# Patient Record
Sex: MALE | Race: BLACK OR AFRICAN AMERICAN | Employment: UNEMPLOYED | URBAN - METROPOLITAN AREA
[De-identification: names, ages, dates, MRNs, and addresses within clinical notes are randomized per-mention and may not be internally consistent; named-entity substitution may affect disease eponyms.]

---

## 2020-10-06 LAB — HBA1C MFR BLD HPLC: 11.4 %

## 2020-11-02 ENCOUNTER — VIRTUAL VISIT (OUTPATIENT)
Dept: FAMILY MEDICINE CLINIC | Age: 50
End: 2020-11-02

## 2020-11-02 DIAGNOSIS — E78.5 DYSLIPIDEMIA: ICD-10-CM

## 2020-11-02 DIAGNOSIS — Z86.69 HISTORY OF SLEEP APNEA: ICD-10-CM

## 2020-11-02 DIAGNOSIS — I48.91 ATRIAL FIBRILLATION, UNSPECIFIED TYPE (HCC): ICD-10-CM

## 2020-11-02 DIAGNOSIS — I10 ESSENTIAL HYPERTENSION: Primary | ICD-10-CM

## 2020-11-02 DIAGNOSIS — I50.9 CHRONIC CONGESTIVE HEART FAILURE, UNSPECIFIED HEART FAILURE TYPE (HCC): ICD-10-CM

## 2020-11-02 DIAGNOSIS — E11.9 CONTROLLED TYPE 2 DIABETES MELLITUS WITHOUT COMPLICATION, WITHOUT LONG-TERM CURRENT USE OF INSULIN (HCC): ICD-10-CM

## 2020-11-02 PROBLEM — E66.01 MORBID OBESITY WITH BODY MASS INDEX (BMI) OF 40.0 TO 44.9 IN ADULT (HCC): Status: ACTIVE | Noted: 2020-03-20

## 2020-11-02 PROBLEM — J45.30 MILD PERSISTENT ASTHMA WITHOUT COMPLICATION: Status: ACTIVE | Noted: 2020-03-20

## 2020-11-02 PROBLEM — Z91.14 NONCOMPLIANCE WITH DIET AND MEDICATION REGIMEN: Status: ACTIVE | Noted: 2020-03-21

## 2020-11-02 PROBLEM — I11.9 HYPERTENSIVE CARDIOMEGALY: Status: ACTIVE | Noted: 2020-03-20

## 2020-11-02 PROBLEM — Z91.119 NONCOMPLIANCE WITH DIET AND MEDICATION REGIMEN: Status: ACTIVE | Noted: 2020-03-21

## 2020-11-02 PROCEDURE — 99204 OFFICE O/P NEW MOD 45 MIN: CPT | Performed by: NURSE PRACTITIONER

## 2020-11-02 RX ORDER — SPIRONOLACTONE 50 MG/1
50 TABLET, FILM COATED ORAL DAILY
Qty: 90 TAB | Refills: 1 | Status: SHIPPED | OUTPATIENT
Start: 2020-11-02 | End: 2021-05-13 | Stop reason: SDUPTHER

## 2020-11-02 RX ORDER — PRAVASTATIN SODIUM 20 MG/1
40 TABLET ORAL
Qty: 180 TAB | Refills: 1 | Status: CANCELLED | OUTPATIENT
Start: 2020-11-02

## 2020-11-02 RX ORDER — ATORVASTATIN CALCIUM 40 MG/1
40 TABLET, FILM COATED ORAL
Qty: 90 TAB | Refills: 1 | Status: SHIPPED | OUTPATIENT
Start: 2020-11-02 | End: 2021-05-13 | Stop reason: SDUPTHER

## 2020-11-02 RX ORDER — HYDRALAZINE HYDROCHLORIDE 50 MG/1
50 TABLET, FILM COATED ORAL 3 TIMES DAILY
Qty: 270 TAB | Refills: 1 | Status: SHIPPED | OUTPATIENT
Start: 2020-11-02 | End: 2021-05-13 | Stop reason: SDUPTHER

## 2020-11-02 RX ORDER — METFORMIN HYDROCHLORIDE 500 MG/1
500 TABLET ORAL 2 TIMES DAILY
COMMUNITY
Start: 2020-10-07 | End: 2020-11-02 | Stop reason: SDUPTHER

## 2020-11-02 RX ORDER — CARVEDILOL 12.5 MG/1
12.5 TABLET ORAL 2 TIMES DAILY WITH MEALS
Qty: 180 TAB | Refills: 1 | Status: SHIPPED | OUTPATIENT
Start: 2020-11-02 | End: 2021-05-13 | Stop reason: SDUPTHER

## 2020-11-02 RX ORDER — ATORVASTATIN CALCIUM 40 MG/1
40 TABLET, FILM COATED ORAL
COMMUNITY
Start: 2020-10-07 | End: 2020-11-02 | Stop reason: SDUPTHER

## 2020-11-02 RX ORDER — METFORMIN HYDROCHLORIDE 500 MG/1
500 TABLET ORAL 2 TIMES DAILY
Qty: 180 TAB | Refills: 1 | Status: SHIPPED | OUTPATIENT
Start: 2020-11-02 | End: 2021-05-13 | Stop reason: SDUPTHER

## 2020-11-02 RX ORDER — BLOOD SUGAR DIAGNOSTIC
STRIP MISCELLANEOUS
COMMUNITY
Start: 2020-10-07 | End: 2021-09-17

## 2020-11-02 RX ORDER — LANCETS
1 EACH MISCELLANEOUS 2 TIMES DAILY
COMMUNITY
Start: 2020-10-07 | End: 2021-09-17

## 2020-11-02 RX ORDER — FUROSEMIDE 80 MG/1
80 TABLET ORAL DAILY
Qty: 90 TAB | Refills: 1 | Status: SHIPPED | OUTPATIENT
Start: 2020-11-02 | End: 2020-12-21 | Stop reason: SDUPTHER

## 2020-11-02 RX ORDER — ISOSORBIDE MONONITRATE 30 MG/1
30 TABLET, EXTENDED RELEASE ORAL
Qty: 90 TAB | Refills: 1 | Status: SHIPPED | OUTPATIENT
Start: 2020-11-02 | End: 2021-05-13 | Stop reason: SDUPTHER

## 2020-11-02 RX ORDER — AMLODIPINE BESYLATE 10 MG/1
10 TABLET ORAL DAILY
Qty: 90 TAB | Refills: 1 | Status: SHIPPED | OUTPATIENT
Start: 2020-11-02 | End: 2021-05-13 | Stop reason: SDUPTHER

## 2020-11-02 RX ORDER — LOSARTAN POTASSIUM 100 MG/1
100 TABLET ORAL DAILY
Qty: 90 TAB | Refills: 1 | Status: SHIPPED | OUTPATIENT
Start: 2020-11-02 | End: 2021-05-13 | Stop reason: SDUPTHER

## 2020-11-02 NOTE — PROGRESS NOTES
Chief Complaint   Patient presents with    New Patient    Diabetes    Irregular Heart Beat         1. Have you been to the ER, urgent care clinic since your last visit? Hospitalized since your last visit? NO    2. Have you seen or consulted any other health care providers outside of the [address] [place] [address] since your last visit? Include any pap smears or colon screening.  Dr. Sarah Hill
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
## 2020-12-21 ENCOUNTER — OFFICE VISIT (OUTPATIENT)
Dept: CARDIOLOGY CLINIC | Age: 50
End: 2020-12-21
Payer: MEDICAID

## 2020-12-21 VITALS
HEART RATE: 72 BPM | OXYGEN SATURATION: 97 % | RESPIRATION RATE: 16 BRPM | HEIGHT: 72 IN | DIASTOLIC BLOOD PRESSURE: 57 MMHG | BODY MASS INDEX: 42.66 KG/M2 | TEMPERATURE: 97.7 F | SYSTOLIC BLOOD PRESSURE: 78 MMHG | WEIGHT: 315 LBS

## 2020-12-21 DIAGNOSIS — R07.9 CHEST PAIN, UNSPECIFIED TYPE: ICD-10-CM

## 2020-12-21 DIAGNOSIS — I10 ESSENTIAL HYPERTENSION: Primary | ICD-10-CM

## 2020-12-21 PROCEDURE — 99204 OFFICE O/P NEW MOD 45 MIN: CPT | Performed by: INTERNAL MEDICINE

## 2020-12-21 RX ORDER — FUROSEMIDE 40 MG/1
40 TABLET ORAL DAILY
Qty: 90 TAB | Refills: 3 | Status: SHIPPED | OUTPATIENT
Start: 2020-12-21

## 2020-12-21 NOTE — PROGRESS NOTES
Victoria Keys presents today for   Chief Complaint   Patient presents with   [address] [facility] [address] New Patient     Chronic CHF       [facility] preferred language for health care discussion is english/other. Personal Protective Equipment:   Personal Protective Equipment was used including: mask-surgical and hands-gloves. Patient was placed on no precaution(s). Patient was masked. Is someone accompanying this pt? No    Is the patient using any DME equipment during OV? Yes; Cane    Depression Screening:  3 most recent PHQ Screens 11/2/2020   Little interest or pleasure in doing things Not at all   Feeling down, depressed, irritable, or hopeless Not at all   Total Score PHQ 2 0       Learning Assessment:  Learning Assessment 11/2/2020   PRIMARY LEARNER Patient   HIGHEST LEVEL OF EDUCATION - PRIMARY LEARNER  2 YEARS [address] LEARNER NONE   CO-LEARNER CAREGIVER No   PRIMARY LANGUAGE ENGLISH   LEARNER PREFERENCE PRIMARY DEMONSTRATION     -   ANSWERED BY Danika Lau   RELATIONSHIP SELF       Abuse Screening:  Abuse Screening Questionnaire 11/2/2020   Do you ever feel afraid of your partner? N   Are you in a relationship with someone who physically or mentally threatens you? N   Is it safe for you to go home? Y       Fall Risk  Fall Risk Assessment, last 12 mths 11/2/2020   Able to walk? Yes   Fall in past 12 months? No       Pt currently taking Anticoagulant therapy? No    Coordination of Care:  1. Have you been to the ER, urgent care clinic since your last visit? Hospitalized since your last visit? N/A    2. Have you seen or consulted any other health care providers outside of the [address] [place] [address] since your last visit? Include any pap smears or colon screening.  N/A

## 2020-12-21 NOTE — PROGRESS NOTES
Cardiovascular Specialists    Mr. Saturnino Randall is a 44 y.o. male with history of cardiomyopathy, hypertension, atrial fibrillation, hyperlipidemia, obesity and COPD. Patient is here today for cardiac evaluation. Patient had was diagnosed with congestive heart failure with low EF in 2011. His ejection fraction improved initially. He denies any prior history of myocardial infarction  Patient has not seen any cardiology physician for almost 3 years. Over last couple months patient was admitted to the hospital with shortness of breath. Patient tells me that he gets short of breath even walking 12 block. He cannot climb 1 or 2 flight of stairs. He has been having some exertional chest tightness which resolves at rest.  There is no resting discomfort. There is no radiation. There is no nausea or vomiting. He claims that he has been taking his medications regularly after the hospital discharge. Before that he was not taking the medication regularly. Denies any edema. He is taking Lasix 80 mg daily along with spironolactone. He has been feeling little dizzy for last couple days. He feels like he is very thirsty    Past Medical History:   Diagnosis Date    [facility]     Asthma     Cardiomyopathy ([facility]     40% (2011), 60%(02/14)    Chronic obstructive pulmonary disease ([facility]     Diabetes ([facility]     Dyslipidemia     Fracture     right arm    GERD (gastroesophageal reflux disease)     Hypercholesterolemia     Hypertension          Past Surgical History:   Procedure Laterality Date    HX ORTHOPAEDIC      surgery for wrist and forearm    HX WISDOM TEETH EXTRACTION         Current Outpatient Medications   Medication Sig    amLODIPine (NORVASC) 10 mg tablet Take 1 Tab by mouth daily.  carvediloL (COREG) 12.5 mg tablet Take 1 Tab by mouth two (2) times daily (with meals).     isosorbide mononitrate ER (IMDUR) 30 mg tablet Take 1 Tab by mouth every morning.  losartan (COZAAR) 100 mg tablet Take 1 Tab by mouth daily.  rivaroxaban (XARELTO) 20 mg tab tablet Take 1 Tab by mouth daily (with breakfast).  spironolactone (ALDACTONE) 50 mg tablet Take 1 Tab by mouth daily.  glucose blood VI test strips (ASCENSIA AUTODISC VI, ONE TOUCH ULTRA TEST VI) strip 1 Each two (2) times a day.  OneTouch Verio test strips strip     lancets misc 1 Each two (2) times a day.  dapagliflozin (Farxiga) 10 mg tab tablet Take 1 Tab by mouth daily.  hydrALAZINE (APRESOLINE) 50 mg tablet Take 1 Tab by mouth three (3) times daily.  metFORMIN (GLUCOPHAGE) 500 mg tablet Take 1 Tab by mouth two (2) times a day.  atorvastatin (LIPITOR) 40 mg tablet Take 1 Tab by mouth nightly.  fluticasone (FLOVENT HFA) 110 mcg/actuation inhaler Take 2 Puffs by inhalation every twelve (12) hours.  aspirin 81 mg chewable tablet Take 1 Tab by mouth daily.  albuterol (PROVENTIL HFA, VENTOLIN HFA, PROAIR HFA) 90 mcg/actuation inhaler Take 1-2 puffs every 4-6 hrs prn shortness of breath    oxyCODONE-acetaminophen (PERCOCET) 5-325 mg per tablet Take 1 Tab by mouth every eight (8) hours as needed for Pain. Max Daily Amount: 3 Tabs. No current facility-administered medications for this visit. Allergies and Sensitivities:  Allergies   Allergen Reactions    Shellfish Derived Hives       Family History:  Family History   Problem Relation Age of Onset    Cancer Mother     No Known Problems Father        Social History:  Social History     Tobacco Use    Smoking status: Former Smoker     Types: Cigars    Smokeless tobacco: Never Used    Tobacco comment: black N mild    Substance Use Topics    Alcohol use: Yes     Alcohol/week: 0.0 standard drinks     Comment: Socially    Drug use: No     He  reports that he has quit smoking. His smoking use included cigars. He has never used smokeless tobacco.  He  reports current alcohol use.     Review of Systems:  Cardiac symptoms as noted above in HPI. All others negative. Denies fatigue, malaise, skin rash, joint pain, blurring vision, photophobia, neck pain, hemoptysis, chronic cough, nausea, vomiting, hematuria, burning micturition, BRBPR, chronic headaches. Physical Exam:  BP Readings from Last 3 Encounters:   12/21/20 (!) 78/57   06/20/16 (!) 160/99   05/17/16 148/86         Pulse Readings from Last 3 Encounters:   12/21/20 72   06/20/16 85   05/17/16 77          Wt Readings from Last 3 Encounters:   12/21/20 325 lb 9.6 oz (147.7 kg)   06/20/16 312 lb (141.5 kg)   05/17/16 314 lb (142.4 kg)       Constitutional: Oriented to person, place, and time. HENT: Head: Normocephalic and atraumatic. Eyes: Conjunctivae and extraocular motions are normal.   Neck: No JVD present. Carotid bruit is not appreciated. Cardiovascular: Regular rhythm. No murmur, gallop or rubs appreciated  Lung: Breath sounds normal. No respiratory distress. No ronchi or rales appreciated  Abdominal: No tenderness. No rebound and no guarding. Musculoskeletal: There is no lower extremity edema. No cynosis  Lymphadenopathy:  No cervical or supraclavicular adenopathy appriciated. Neurological: No gross motor deficit noted. Skin: No visible skin rash noted. No Ear discharge noted  Psychiatric: Normal mood and affect. Review of Data  LABS:   Lab Results   Component Value Date/Time    Sodium 140 06/20/2016 04:15 PM    Potassium 4.3 06/20/2016 04:15 PM    Chloride 100 06/20/2016 04:15 PM    CO2 21 06/20/2016 04:15 PM    Glucose 304 (H) 06/20/2016 04:15 PM    BUN 17 06/20/2016 04:15 PM    Creatinine 0.97 06/20/2016 04:15 PM     No flowsheet data found.   No results found for: ALT  Lab Results   Component Value Date/Time    Hemoglobin A1c, External 11.4 10/06/2020       EKG  (05/16) Sinus rhythm at 74 beats per minute, left ventricular hypertrophy with repolarization abnormality, nonspecific ST-T changes, normal ND and QRS interval.  I personally reviewed and reported this EKG. ECHO (10/20)  NORMAL LEFT VENTRICULAR CAVITY SIZE AND SYSTOLIC FUNCTION WITH A CALCULATED EJECTION   FRACTION OF 56%. SEVERE DIASTOLIC DYSFUNCTION. MODERATE SEPTAL AND SEVERE INFEROLATERAL HYPERTROPHY. HYPOKINESIS OF THE BASAL TO MID INFERIOR WALL SEGMENTS. SEVERELY DILATED LEFT ATRIUM. NORMAL RIGHT HEART SIZE AND SYSTOLIC FUNCTION. DILATED INFERIOR VENA CAVA (2.4 CM) WITH RESPIRATORY COLLAPSE. MILD PULMONIC REGURGITATION. TRACE MITRAL REGURGITATION. UNABLE TO ASSESS THE PULMONARY ARTERY PRESSURE DUE TO INSIGNIFICANT TRICUSPID REGURGITATION. DILATED AORTIC ROOT DIAMETER (3.8 CM). DILATED ASCENDING AORTA (3.6 CM). TRIVIAL PERICARDIAL EFFUSION NOTED WITHOUT OBVIOUS EVIDENCE OF HEMODYNAMIC COMPROMISE. OTHER FINDINGS AS NOTED BELOW. COMPARISON TO THE PREVIOUS REPORT DATED 6/25/2018:   1. THE EF HAS INCREASED FROM 50% TO 56%. THE WALL MOTION ABNORMALITIES ARE A NEW FINDING. 2. THE DILATED AORTIC ROOT AND ASCENDING AORTA ARE NEW FINDINGS. STRESS TEST (04/14)  OVERALL IMPRESSION:  Lexiscan-gated SPECT sestamibi stress test with marked left ventricular enlargement, global hypokinesia, and a calculated ejection fraction of 31%. Diaphragmatic soft tissue attenuation artifact is noted. There is no definite scar. There is no ischemia. This is a low-to-medium risk study due to left ventricular dysfunction. IMPRESSION & PLAN:  Mr. Ana Ignacio is a 44 y.o. male with a history of congestive heart failure, atrial fibrillation, obesity, hypertension and hyperlipidemia, COPD. Cardiomyopathy:   Mr. Ana Ignacio, according to record, has congestive heart failure dating back to 2011 LVEF 40%. LVEF was 60% in 2014  LVEF 55% in 10/2020. There was evidence of some regional hypertrophy and wall motion abnormality. Considering his dyspnea, chest pain and wall motion abnormality, underlying CAD needs to be ruled out.   We discussed about my recommendation regarding left heart catheterization for definitive coronary evaluation however patient is very nervous and he does not want any invasive evaluation. I will proceed with nuclear stress test.  He is currently on Lasix 80 mg daily and spironolactone. He does not have significant fluid overload on exam  His blood pressure initially was very low. I am going to reduce his Lasix to 40 mg daily from 80 mg daily. Continue spironolactone  His blood pressure improved to 858 systolic towards end of the clinic  Continue Coreg, Imdur, losartan and spironolactone    Dyspnea/chest pain:  Concerning for underlying CAD and angina. I recommended left heart catheterization with recent echo finding and ongoing symptoms however patient does not want left heart catheterization despite recommendation. He understands risk, benefit and alternatives. We will proceed with nuclear stress test  Currently on Coreg, amlodipine, Imdur  Already on Xarelto  As against exertional activity    Atrial fibrillation: This is paroxysmal in nature. Appears to be in atrial fibrillation exam today  Continue Coreg  He is on Xarelto and aspirin. I am recommending him to stop aspirin at least for now    Hypertension:  His blood pressure is low initially. After giving some oral fluid, blood pressure has improved. Continue antihypertensive except and reducing dose of Lasix to 40 mg daily    Hyperlipidemia:  He is on Pravachol and he has run out of this medication for a week, which I will refill today. Lipid profile will be ordered. Obesity: He weighs 325 pounds. Importance of diet and exercise was discussed with the patient. This plan was discussed with patient who is in agreement. Thank you for allowing me to participate in patient care. Please feel free to call me if you have any question or concern. Judy Bates MD  Please note: This document has been produced using voice recognition software. Unrecognized errors in transcription may be present.

## 2020-12-21 NOTE — PATIENT INSTRUCTIONS
Testing Nuclear Stress Please call Maye scheduling at [phone]  to schedule an appointment. All testing is completed at [address] [facility] **call office 3-5 days after testing is completed for results** New Medication/Medication Changes Lasix 40 mg daily **please allow 24-48 hrs for medication to be escribed to pharmacy** Other DME order for blood pressure kit- given to patient

## 2020-12-21 NOTE — LETTER
12/21/2020 Patient: Hernandez Blunt YOB: 1970 Date of Visit: 12/21/2020 Emely Scott NP 
[address] [id number] [address] [employer] Dear Emely Scott NP, Thank you for referring Mr. Hernandez Blunt to [facility] SPECIALIST AT [facility] for evaluation. My notes for this consultation are attached. If you have questions, please do not hesitate to call me. I look forward to following your patient along with you. Sincerely, Pilo Rainey MD

## 2020-12-29 ENCOUNTER — HOSPITAL ENCOUNTER (OUTPATIENT)
Dept: NUCLEAR MEDICINE | Age: 50
Discharge: HOME | End: 2020-12-29
Attending: INTERNAL MEDICINE
Payer: MEDICAID

## 2020-12-29 ENCOUNTER — HOSPITAL ENCOUNTER (OUTPATIENT)
Dept: NON INVASIVE DIAGNOSTICS | Age: 50
Discharge: HOME | End: 2020-12-29
Attending: INTERNAL MEDICINE
Payer: MEDICAID

## 2020-12-29 VITALS
SYSTOLIC BLOOD PRESSURE: 96 MMHG | DIASTOLIC BLOOD PRESSURE: 55 MMHG | WEIGHT: 315 LBS | HEIGHT: 72 IN | BODY MASS INDEX: 42.66 KG/M2

## 2020-12-29 DIAGNOSIS — R07.9 CHEST PAIN, UNSPECIFIED TYPE: ICD-10-CM

## 2020-12-29 PROCEDURE — 93017 CV STRESS TEST TRACING ONLY: CPT

## 2020-12-29 PROCEDURE — 74011250636 HC RX REV CODE- 250/636: Performed by: INTERNAL MEDICINE

## 2020-12-29 PROCEDURE — 78452 HT MUSCLE IMAGE SPECT MULT: CPT

## 2020-12-29 RX ORDER — SODIUM CHLORIDE 0.9 % (FLUSH) 0.9 %
5 SYRINGE (ML) INJECTION
Status: DISPENSED | OUTPATIENT
Start: 2020-12-29 | End: 2020-12-29

## 2020-12-29 RX ADMIN — REGADENOSON 0.4 MG: 0.08 INJECTION, SOLUTION INTRAVENOUS at 07:45

## 2020-12-31 LAB
STRESS BASELINE HR: 69 BPM
STRESS ESTIMATED WORKLOAD: 1.4 METS
STRESS EXERCISE DUR MIN: NORMAL
STRESS PEAK DIAS BP: 73 MMHG
STRESS PEAK SYS BP: 144 MMHG
STRESS PERCENT HR ACHIEVED: 57 %
STRESS POST PEAK HR: 96 BPM
STRESS RATE PRESSURE PRODUCT: NORMAL BPM*MMHG
STRESS TARGET HR: 168 BPM

## 2021-01-04 ENCOUNTER — TELEPHONE (OUTPATIENT)
Dept: CARDIOLOGY CLINIC | Age: 51
End: 2021-01-04

## 2021-01-04 NOTE — TELEPHONE ENCOUNTER
Contacted pt at [facility] number. Two patient Identifiers confirmed. Advised pt per Dr Brina Ware. Pt verbalized understanding and scheduled for 1/7/21.

## 2021-01-04 NOTE — TELEPHONE ENCOUNTER
----- Message from Kiko Rodríguez MD sent at 12/31/2020 11:58 AM EST -----  Abnormal stress test  Needs cath discussion follow up    Thanks  SP    ----- Message -----  From: Hilary Dee MD  Sent: 12/31/2020   8:55 AM EST  To: Kiko Rodríguez MD

## 2021-01-07 ENCOUNTER — OFFICE VISIT (OUTPATIENT)
Dept: CARDIOLOGY CLINIC | Age: 51
End: 2021-01-07
Payer: MEDICAID

## 2021-01-07 VITALS
BODY MASS INDEX: 42.66 KG/M2 | WEIGHT: 315 LBS | SYSTOLIC BLOOD PRESSURE: 123 MMHG | RESPIRATION RATE: 16 BRPM | TEMPERATURE: 97.3 F | HEIGHT: 72 IN | DIASTOLIC BLOOD PRESSURE: 74 MMHG | HEART RATE: 74 BPM | OXYGEN SATURATION: 96 %

## 2021-01-07 DIAGNOSIS — Z01.818 PRE-OP TESTING: Primary | ICD-10-CM

## 2021-01-07 PROCEDURE — 99215 OFFICE O/P EST HI 40 MIN: CPT | Performed by: INTERNAL MEDICINE

## 2021-01-07 RX ORDER — SODIUM CHLORIDE 0.9 % (FLUSH) 0.9 %
5-40 SYRINGE (ML) INJECTION AS NEEDED
Status: CANCELLED | OUTPATIENT
Start: 2021-01-07

## 2021-01-07 RX ORDER — PREDNISONE 50 MG/1
TABLET ORAL
Qty: 3 TAB | Refills: 0 | Status: SHIPPED | OUTPATIENT
Start: 2021-01-07 | End: 2021-09-03

## 2021-01-07 RX ORDER — SODIUM CHLORIDE 9 MG/ML
1000 INJECTION, SOLUTION INTRAVENOUS CONTINUOUS
Status: CANCELLED | OUTPATIENT
Start: 2021-01-07

## 2021-01-07 RX ORDER — ASPIRIN 325 MG
162 TABLET ORAL DAILY
Status: CANCELLED | OUTPATIENT
Start: 2021-01-07

## 2021-01-07 RX ORDER — SODIUM CHLORIDE 0.9 % (FLUSH) 0.9 %
5-40 SYRINGE (ML) INJECTION EVERY 8 HOURS
Status: CANCELLED | OUTPATIENT
Start: 2021-01-07

## 2021-01-07 RX ORDER — DIPHENHYDRAMINE HCL 25 MG
TABLET ORAL
Qty: 2 TAB | Refills: 0 | Status: SHIPPED | OUTPATIENT
Start: 2021-01-07 | End: 2021-09-03

## 2021-01-07 NOTE — PROGRESS NOTES
Jarad Jon presents today for   Chief Complaint   Patient presents with    Follow-up       Jarad Jon preferred language for health care discussion is english/other. Personal Protective Equipment:   Personal Protective Equipment was used including: mask-surgical and hands-gloves. Patient was placed on no precaution(s). Patient was masked. Is someone accompanying this pt? No    Is the patient using any DME equipment during OV? No    Depression Screening:  3 most recent PHQ Screens 11/2/2020   Little interest or pleasure in doing things Not at all   Feeling down, depressed, irritable, or hopeless Not at all   Total Score PHQ 2 0       Learning Assessment:  Learning Assessment 11/2/2020   PRIMARY LEARNER Patient   HIGHEST LEVEL OF EDUCATION - PRIMARY LEARNER  2 YEARS [facility] PRIMARY LEARNER NONE   CO-LEARNER CAREGIVER No   PRIMARY LANGUAGE ENGLISH   LEARNER PREFERENCE PRIMARY DEMONSTRATION     -   ANSWERED BY Clearance Seun   RELATIONSHIP SELF       Abuse Screening:  Abuse Screening Questionnaire 11/2/2020   Do you ever feel afraid of your partner? N   Are you in a relationship with someone who physically or mentally threatens you? N   Is it safe for you to go home? Y       Fall Risk  Fall Risk Assessment, last 12 mths 11/2/2020   Able to walk? Yes   Fall in past 12 months? No       Pt currently taking Anticoagulant therapy? No    Coordination of Care:  1. Have you been to the ER, urgent care clinic since your last visit? Hospitalized since your last visit? No    2. Have you seen or consulted any other health care providers outside of the [address] [place] [address] since your last visit? Include any pap smears or colon screening.  No

## 2021-01-07 NOTE — PROGRESS NOTES
Cardiovascular Specialists    Mr. Christen Irving is a 44 y.o. male with history of cardiomyopathy, hypertension, atrial fibrillation, hyperlipidemia, obesity and COPD. Patient is here today for follow-up appointment. Please see detailed HPI from last visit. He continues to have dyspnea with exertion and some chest pressure with exertion. He denies any excessive swelling of lower extremity. He takes his medications regularly. He denies presyncope or syncope    Past Medical History:   Diagnosis Date    [facility]     Asthma     Cardiomyopathy ([facility]     40% (2011), 60%(02/14)    Chronic obstructive pulmonary disease (HCC)     Diabetes ([facility]     Dyslipidemia     Fracture     right arm    GERD (gastroesophageal reflux disease)     Hypercholesterolemia     Hypertension          Past Surgical History:   Procedure Laterality Date    HX ORTHOPAEDIC      surgery for wrist and forearm    HX WISDOM TEETH EXTRACTION         Current Outpatient Medications   Medication Sig    furosemide (LASIX) 40 mg tablet Take 1 Tab by mouth daily.  amLODIPine (NORVASC) 10 mg tablet Take 1 Tab by mouth daily.  carvediloL (COREG) 12.5 mg tablet Take 1 Tab by mouth two (2) times daily (with meals).  isosorbide mononitrate ER (IMDUR) 30 mg tablet Take 1 Tab by mouth every morning.  losartan (COZAAR) 100 mg tablet Take 1 Tab by mouth daily.  rivaroxaban (XARELTO) 20 mg tab tablet Take 1 Tab by mouth daily (with breakfast).  spironolactone (ALDACTONE) 50 mg tablet Take 1 Tab by mouth daily.  glucose blood VI test strips (ASCENSIA AUTODISC VI, ONE TOUCH ULTRA TEST VI) strip 1 Each two (2) times a day.  OneTouch Verio test strips strip     lancets misc 1 Each two (2) times a day.  dapagliflozin (Farxiga) 10 mg tab tablet Take 1 Tab by mouth daily.  hydrALAZINE (APRESOLINE) 50 mg tablet Take 1 Tab by mouth three (3) times daily.     metFORMIN (GLUCOPHAGE) 500 mg tablet Take 1 Tab by mouth two (2) times a day.  atorvastatin (LIPITOR) 40 mg tablet Take 1 Tab by mouth nightly.  fluticasone (FLOVENT HFA) 110 mcg/actuation inhaler Take 2 Puffs by inhalation every twelve (12) hours.  albuterol (PROVENTIL HFA, VENTOLIN HFA, PROAIR HFA) 90 mcg/actuation inhaler Take 1-2 puffs every 4-6 hrs prn shortness of breath    oxyCODONE-acetaminophen (PERCOCET) 5-325 mg per tablet Take 1 Tab by mouth every eight (8) hours as needed for Pain. Max Daily Amount: 3 Tabs. No current facility-administered medications for this visit. Allergies and Sensitivities:  Allergies   Allergen Reactions    Shellfish Derived Hives       Family History:  Family History   Problem Relation Age of Onset    Cancer Mother     No Known Problems Father        Social History:  Social History     Tobacco Use    Smoking status: Former Smoker     Types: Cigars    Smokeless tobacco: Never Used    Tobacco comment: black N mild    Substance Use Topics    Alcohol use: Yes     Alcohol/week: 0.0 standard drinks     Comment: Socially    Drug use: No     He  reports that he has quit smoking. His smoking use included cigars. He has never used smokeless tobacco.  He  reports current alcohol use. Review of Systems:  Cardiac symptoms as noted above in HPI. All others negative. Physical Exam:  BP Readings from Last 3 Encounters:   01/07/21 123/74   12/29/20 (!) 96/55   12/21/20 (!) 78/57         Pulse Readings from Last 3 Encounters:   01/07/21 74   12/21/20 72   06/20/16 85          Wt Readings from Last 3 Encounters:   01/07/21 322 lb 12.8 oz (146.4 kg)   12/29/20 325 lb (147.4 kg)   12/21/20 325 lb 9.6 oz (147.7 kg)       Constitutional: Oriented to person, place, and time. HENT: Head: Normocephalic and atraumatic. Neck: No JVD present. Carotid bruit is not appreciated. Cardiovascular: Regular rhythm.    No murmur, gallop or rubs appreciated  Lung: Breath sounds normal. No respiratory distress. No ronchi or rales appreciated  Abdominal: No tenderness. No rebound and no guarding. Musculoskeletal: There is no lower extremity edema. No cynosis      Review of Data  LABS:   Lab Results   Component Value Date/Time    Sodium 140 06/20/2016 04:15 PM    Potassium 4.3 06/20/2016 04:15 PM    Chloride 100 06/20/2016 04:15 PM    CO2 21 06/20/2016 04:15 PM    Glucose 304 (H) 06/20/2016 04:15 PM    BUN 17 06/20/2016 04:15 PM    Creatinine 0.97 06/20/2016 04:15 PM     No flowsheet data found. No results found for: ALT  Lab Results   Component Value Date/Time    Hemoglobin A1c, External 11.4 10/06/2020       EKG  (05/16) Sinus rhythm at 74 beats per minute, left ventricular hypertrophy with repolarization abnormality, nonspecific ST-T changes, normal NE and QRS interval.  I personally reviewed and reported this EKG. ECHO (10/20)  NORMAL LEFT VENTRICULAR CAVITY SIZE AND SYSTOLIC FUNCTION WITH A CALCULATED EJECTION   FRACTION OF 56%. SEVERE DIASTOLIC DYSFUNCTION. MODERATE SEPTAL AND SEVERE INFEROLATERAL HYPERTROPHY. HYPOKINESIS OF THE BASAL TO MID INFERIOR WALL SEGMENTS. SEVERELY DILATED LEFT ATRIUM. NORMAL RIGHT HEART SIZE AND SYSTOLIC FUNCTION. DILATED INFERIOR VENA CAVA (2.4 CM) WITH RESPIRATORY COLLAPSE. MILD PULMONIC REGURGITATION. TRACE MITRAL REGURGITATION. UNABLE TO ASSESS THE PULMONARY ARTERY PRESSURE DUE TO INSIGNIFICANT TRICUSPID REGURGITATION. DILATED AORTIC ROOT DIAMETER (3.8 CM). DILATED ASCENDING AORTA (3.6 CM). TRIVIAL PERICARDIAL EFFUSION NOTED WITHOUT OBVIOUS EVIDENCE OF HEMODYNAMIC COMPROMISE. OTHER FINDINGS AS NOTED BELOW. COMPARISON TO THE PREVIOUS REPORT DATED 6/25/2018:   1. THE EF HAS INCREASED FROM 50% TO 56%. THE WALL MOTION ABNORMALITIES ARE A NEW FINDING. 2. THE DILATED AORTIC ROOT AND ASCENDING AORTA ARE NEW FINDINGS. STRESS TEST (12/20)  · Baseline ECG: Normal sinus rhythm, non-specific ST-T wave abnormalities.   · Gated SPECT: Left ventricular function post-stress was abnormal. Calculated ejection fraction is 39%. · Mild-moderate diffuse hypokinesis with possible more pronounced inferior/inferoseptal hypokinesis. · Myocardial perfusion imaging defect 1: There is a defect that is small to medium in size present in the inferior and inferolateral location(s) that is non-reversible. The possibility of artifact and infarction cannot be excluded. · Myocardial perfusion imaging supports an intermediate risk stress test.    IMPRESSION & PLAN:  Mr. Christen Irving is a 44 y.o. male with a history of congestive heart failure, atrial fibrillation, obesity, hypertension and hyperlipidemia, COPD. Cardiomyopathy:   Mr. Christen Irving, according to record, has congestive heart failure dating back to 2011 LVEF 40%. LVEF was 60% in 2014  LVEF 55% in 10/2020. There was evidence of some regional hypertrophy and wall motion abnormality. Nuclear stress test in 12/2020, intermediate risk with LV dysfunction and wall motion abnormality and some perfusion defect. Because of ongoing dyspnea with exertion and exertional chest pressure, recommend left heart catheterization. He does not have any fluid overload on exam.  His symptoms are out of proportion to his exam  Discussed regarding management strategy which includes medical management vs. Ischemia evaluation ( non-invasive vs. Invasive). Risk, benefit and alternatives of each strategy discussed in detail. Risk, benefit, complication of LHC and possible PCI ( including but not limited to bleeding, vascular trauma requiring surgery,  infection, heart failure, stroke, MI, emergent bypass surgery, severe allergic reactions, kidney failure, dialysis and death ) were discussed with patient and willing to proceed with procedure. Will be using moderate sedation   By stating these are possible risks, this does not exclude the potential for additional risks not named here.     Dyspnea/chest pain:  Concerning for underlying CAD and angina. Clear stress test, intermediate risk as mentioned above. Discussed regarding management strategy which includes medical management vs. Ischemia evaluation ( non-invasive vs. Invasive). Risk, benefit and alternatives of each strategy discussed in detail. Risk, benefit, complication of LHC and possible PCI ( including but not limited to bleeding, vascular trauma requiring surgery,  infection, heart failure, stroke, MI, emergent bypass surgery, severe allergic reactions, kidney failure, dialysis and death ) were discussed with patient and willing to proceed with procedure. Will be using moderate sedation   By stating these are possible risks, this does not exclude the potential for additional risks not named here. Atrial fibrillation: This is paroxysmal in nature. Appears to be in atrial fibrillation exam today  Continue Coreg  He is on Xarelto and aspirin. Hypertension:  His blood pressure today is 123/74. Continue same    Hyperlipidemia:  He is on Pravachol and he has run out of this medication for a week, which I will refill today. Obesity: He weighs 325 pounds. Importance of diet and exercise was discussed with the patient. This plan was discussed with patient who is in agreement. Thank you for allowing me to participate in patient care. Please feel free to call me if you have any question or concern. Jessica Ochoa MD  Please note: This document has been produced using voice recognition software. Unrecognized errors in transcription may be present.

## 2021-01-07 NOTE — TELEPHONE ENCOUNTER
PCP: Flora Valero NP    Last appt: 2021  No future appointments. Requested Prescriptions     Pending Prescriptions Disp Refills    predniSONE (DELTASONE) 50 mg tablet 3 Tab 0     Si mg prednisone by mouth 13 hours, 7 hours, 1 hours prior to procedure.  diphenhydrAMINE (Benadryl Allergy) 25 mg tablet 2 Tab 0     Si mg of Benadryl 1 hour prior to procedure.            Other Comments:  Pre procedure -[facility]

## 2021-01-07 NOTE — PATIENT INSTRUCTIONS
*Pre -procedure LABWORK is to be done within one week of your procedure date **YOU WILL NEED SOMEONE TO DRIVE YOU HOME AFTER PROCEDURE. 1. You are scheduled to have a left heart catherization on 02/01/21 at 10 am Please check in at 0800.  
 
2. Please go to [facility] and park in the outpatient lot. Once you enter check in with the  there. The  will either give you directions or assist you in getting to the cath holding area. 4. You are not to eat or drink anything after midnight the night prior to procedure. You may take a sip of water to take morning medications. If you are diabetic, Do Not take your insulin/sugar pill the morning of the procedure. 5. MEDICATION INSTRUCTIONS:   Please take your morning medications with the following special instructions: 
 
[x]          Please make sure to take your Blood pressure medication. [x]          Take your Aspirin and/or Plavix. [x]          50 mg prednisone 13 hours, 7 hours, 1 hours prior to procedure. 50 mg of Benadryl 1 hour prior to procedure. This is to prevent you                   from having an allergic reaction to the dye. DO NOT DRIVE AFTER TAKING THE BENADRYL. 6. We encourage families to wait in the waiting room on the first floor while the procedure is being done. The Doctor will come out and talk with you as soon as the procedure is over. 7. There is the possibility that you may spend the night in the hospital, depending on the results of the procedure. This will be determined after the procedure is done. If angioplasty or stent is planned, you will stay at least one day. 8. If you or your family have any questions, please call our office Monday Friday, 9:00 a. m.4:30 p.m.,  At [phone], and ask to speak to one of the nurses. 9. Be sure you have someone to drive you home, you will not be able to drive after the procedure. Patient verbalized understanding of the above instructions and was advised to call office with any further questions or concerns at [phone].

## 2021-01-11 ENCOUNTER — DOCUMENTATION ONLY (OUTPATIENT)
Dept: CARDIOLOGY CLINIC | Age: 51
End: 2021-01-11

## 2021-01-11 NOTE — PROGRESS NOTES
[address] [place] [address] ( [employer]). Two patient Identifiers confirmed. [address] [place] [address] approved Approval # [id number]. No dates for approval given. No other issues noted.

## 2021-01-12 DIAGNOSIS — Z01.818 PRE-OP TESTING: Primary | ICD-10-CM

## 2021-01-29 ENCOUNTER — TELEPHONE (OUTPATIENT)
Dept: CARDIOLOGY CLINIC | Age: 51
End: 2021-01-29

## 2021-01-29 NOTE — TELEPHONE ENCOUNTER
Incoming from pt advising he needs to reschedule LHC for 2/1/20 due to hurting his ankle. Contacted pt at Replaced by [facility] number. Two patient Identifiers confirmed. Advised LHC rescheduled for 2/15/21 at 0800 with 0600 check in . All other instructions are the same. Pt verbalized understanding.

## 2021-02-09 ENCOUNTER — DOCUMENTATION ONLY (OUTPATIENT)
Dept: CARDIOLOGY CLINIC | Age: 51
End: 2021-02-09

## 2021-02-09 ENCOUNTER — TELEPHONE (OUTPATIENT)
Dept: CARDIOLOGY CLINIC | Age: 51
End: 2021-02-09

## 2021-02-09 NOTE — PROGRESS NOTES
[facility] approved through [employer] [id number] from 1/11/21 through 3/12/21.

## 2021-02-09 NOTE — TELEPHONE ENCOUNTER
Contacted pt at [facility] number. Two patient Identifiers confirmed. Advised pt per Dr Mckay Wells. Pt verbalized understanding.

## 2021-02-12 ENCOUNTER — HOSPITAL ENCOUNTER (OUTPATIENT)
Dept: PREADMISSION TESTING | Age: 51
Discharge: HOME | End: 2021-02-12
Payer: MEDICAID

## 2021-02-12 DIAGNOSIS — Z01.818 PRE-OP TESTING: ICD-10-CM

## 2021-02-12 LAB
ALBUMIN SERPL-MCNC: 3.8 G/DL (ref 3.4–5)
ALBUMIN/GLOB SERPL: 1.1 {RATIO} (ref 0.8–1.7)
ALP SERPL-CCNC: 132 U/L (ref 45–117)
ALT SERPL-CCNC: 30 U/L (ref 16–61)
ANION GAP SERPL CALC-SCNC: 6 MMOL/L (ref 3–18)
AST SERPL-CCNC: 12 U/L (ref 10–38)
BASOPHILS # BLD: 0 K/UL (ref 0–0.1)
BASOPHILS NFR BLD: 0 % (ref 0–2)
BILIRUB SERPL-MCNC: 0.6 MG/DL (ref 0.2–1)
BUN SERPL-MCNC: 24 MG/DL (ref 7–18)
BUN/CREAT SERPL: 17 (ref 12–20)
CALCIUM SERPL-MCNC: 9.1 MG/DL (ref 8.5–10.1)
CHLORIDE SERPL-SCNC: 106 MMOL/L (ref 100–111)
CO2 SERPL-SCNC: 24 MMOL/L (ref 21–32)
CREAT SERPL-MCNC: 1.39 MG/DL (ref 0.6–1.3)
DIFFERENTIAL METHOD BLD: ABNORMAL
EOSINOPHIL # BLD: 0.1 K/UL (ref 0–0.4)
EOSINOPHIL NFR BLD: 2 % (ref 0–5)
ERYTHROCYTE [DISTWIDTH] IN BLOOD BY AUTOMATED COUNT: 15.7 % (ref 11.6–14.5)
GLOBULIN SER CALC-MCNC: 3.6 G/DL (ref 2–4)
GLUCOSE SERPL-MCNC: 362 MG/DL (ref 74–99)
HCT VFR BLD AUTO: 46.9 % (ref 36–48)
HGB BLD-MCNC: 14.4 G/DL (ref 13–16)
INR PPP: 2.6 (ref 0.8–1.2)
LYMPHOCYTES # BLD: 0.9 K/UL (ref 0.9–3.6)
LYMPHOCYTES NFR BLD: 16 % (ref 21–52)
MCH RBC QN AUTO: 27.4 PG (ref 24–34)
MCHC RBC AUTO-ENTMCNC: 30.7 G/DL (ref 31–37)
MCV RBC AUTO: 89.2 FL (ref 74–97)
MONOCYTES # BLD: 0.2 K/UL (ref 0.05–1.2)
MONOCYTES NFR BLD: 3 % (ref 3–10)
NEUTS SEG # BLD: 4.5 K/UL (ref 1.8–8)
NEUTS SEG NFR BLD: 79 % (ref 40–73)
PLATELET # BLD AUTO: 234 K/UL (ref 135–420)
PMV BLD AUTO: 11.2 FL (ref 9.2–11.8)
POTASSIUM SERPL-SCNC: 4.9 MMOL/L (ref 3.5–5.5)
PROT SERPL-MCNC: 7.4 G/DL (ref 6.4–8.2)
PROTHROMBIN TIME: 26.8 SEC (ref 11.5–15.2)
RBC # BLD AUTO: 5.26 M/UL (ref 4.7–5.5)
SODIUM SERPL-SCNC: 136 MMOL/L (ref 136–145)
WBC # BLD AUTO: 5.7 K/UL (ref 4.6–13.2)

## 2021-02-12 PROCEDURE — U0003 INFECTIOUS AGENT DETECTION BY NUCLEIC ACID (DNA OR RNA); SEVERE ACUTE RESPIRATORY SYNDROME CORONAVIRUS 2 (SARS-COV-2) (CORONAVIRUS DISEASE [COVID-19]), AMPLIFIED PROBE TECHNIQUE, MAKING USE OF HIGH THROUGHPUT TECHNOLOGIES AS DESCRIBED BY CMS-2020-01-R: HCPCS

## 2021-02-12 PROCEDURE — 85610 PROTHROMBIN TIME: CPT

## 2021-02-12 PROCEDURE — 36415 COLL VENOUS BLD VENIPUNCTURE: CPT

## 2021-02-12 PROCEDURE — 85025 COMPLETE CBC W/AUTO DIFF WBC: CPT

## 2021-02-12 PROCEDURE — 80053 COMPREHEN METABOLIC PANEL: CPT

## 2021-02-14 LAB — SARS-COV-2, COV2NT: NOT DETECTED

## 2021-02-15 ENCOUNTER — TELEPHONE (OUTPATIENT)
Dept: CARDIOLOGY CLINIC | Age: 51
End: 2021-02-15

## 2021-02-15 NOTE — TELEPHONE ENCOUNTER
Pt [facility] rescheduled to 02/19/21 at 0900 with arrival at 0700 confirmed date and time with [facility]. Will contact pt.

## 2021-02-15 NOTE — TELEPHONE ENCOUNTER
Contacted pt at [facility] number. Two patient Identifiers confirmed. Advised pt per notes below regarding rescheduling. Advised same instructions applied. Pt verbalized understanding.

## 2021-02-19 ENCOUNTER — HOSPITAL ENCOUNTER (OUTPATIENT)
Age: 51
Setting detail: OUTPATIENT SURGERY
Discharge: HOME | End: 2021-02-19
Attending: INTERNAL MEDICINE | Admitting: INTERNAL MEDICINE
Payer: MEDICAID

## 2021-02-19 ENCOUNTER — HOSPITAL ENCOUNTER (EMERGENCY)
Age: 51
Discharge: HOME | End: 2021-02-19
Attending: EMERGENCY MEDICINE
Payer: MEDICAID

## 2021-02-19 ENCOUNTER — APPOINTMENT (OUTPATIENT)
Dept: GENERAL RADIOLOGY | Age: 51
End: 2021-02-19
Attending: INTERNAL MEDICINE
Payer: MEDICAID

## 2021-02-19 ENCOUNTER — APPOINTMENT (OUTPATIENT)
Dept: GENERAL RADIOLOGY | Age: 51
End: 2021-02-19
Attending: EMERGENCY MEDICINE
Payer: MEDICAID

## 2021-02-19 VITALS
RESPIRATION RATE: 16 BRPM | OXYGEN SATURATION: 98 % | TEMPERATURE: 98.3 F | DIASTOLIC BLOOD PRESSURE: 69 MMHG | HEIGHT: 73 IN | SYSTOLIC BLOOD PRESSURE: 127 MMHG | WEIGHT: 315 LBS | HEART RATE: 62 BPM | BODY MASS INDEX: 41.75 KG/M2

## 2021-02-19 VITALS
OXYGEN SATURATION: 96 % | DIASTOLIC BLOOD PRESSURE: 68 MMHG | HEART RATE: 57 BPM | WEIGHT: 315 LBS | SYSTOLIC BLOOD PRESSURE: 119 MMHG | HEIGHT: 73 IN | RESPIRATION RATE: 15 BRPM | BODY MASS INDEX: 41.75 KG/M2 | TEMPERATURE: 97.4 F

## 2021-02-19 DIAGNOSIS — I20.8 STABLE ANGINA PECTORIS (HCC): ICD-10-CM

## 2021-02-19 DIAGNOSIS — S82.891A CLOSED RIGHT ANKLE FRACTURE, INITIAL ENCOUNTER: Primary | ICD-10-CM

## 2021-02-19 DIAGNOSIS — R94.39 ABNORMAL NUCLEAR STRESS TEST: ICD-10-CM

## 2021-02-19 LAB
APTT PPP: 28.7 SEC (ref 23–36.4)
GLUCOSE BLD STRIP.AUTO-MCNC: 265 MG/DL (ref 70–110)
GLUCOSE BLD STRIP.AUTO-MCNC: 284 MG/DL (ref 70–110)
INR PPP: 1.1 (ref 0.8–1.2)
PROTHROMBIN TIME: 14.2 SEC (ref 11.5–15.2)

## 2021-02-19 PROCEDURE — 77030029997 HC DEV COM RDL R BND TELE -B: Performed by: INTERNAL MEDICINE

## 2021-02-19 PROCEDURE — 77030016699 HC CATH ANGI DX INFN1 CARD -A: Performed by: INTERNAL MEDICINE

## 2021-02-19 PROCEDURE — C1769 GUIDE WIRE: HCPCS | Performed by: INTERNAL MEDICINE

## 2021-02-19 PROCEDURE — 77030012597: Performed by: INTERNAL MEDICINE

## 2021-02-19 PROCEDURE — 77030013761 HC KT HRT LFT ANGI -B: Performed by: INTERNAL MEDICINE

## 2021-02-19 PROCEDURE — 73600 X-RAY EXAM OF ANKLE: CPT

## 2021-02-19 PROCEDURE — 73610 X-RAY EXAM OF ANKLE: CPT

## 2021-02-19 PROCEDURE — 85610 PROTHROMBIN TIME: CPT

## 2021-02-19 PROCEDURE — 99152 MOD SED SAME PHYS/QHP 5/>YRS: CPT | Performed by: INTERNAL MEDICINE

## 2021-02-19 PROCEDURE — 93458 L HRT ARTERY/VENTRICLE ANGIO: CPT | Performed by: INTERNAL MEDICINE

## 2021-02-19 PROCEDURE — 77030015766: Performed by: INTERNAL MEDICINE

## 2021-02-19 PROCEDURE — 99153 MOD SED SAME PHYS/QHP EA: CPT | Performed by: INTERNAL MEDICINE

## 2021-02-19 PROCEDURE — 74011000250 HC RX REV CODE- 250: Performed by: INTERNAL MEDICINE

## 2021-02-19 PROCEDURE — 77030013797 HC KT TRNSDUC PRSSR EDWD -A: Performed by: INTERNAL MEDICINE

## 2021-02-19 PROCEDURE — 74011250636 HC RX REV CODE- 250/636: Performed by: INTERNAL MEDICINE

## 2021-02-19 PROCEDURE — 76210000000 HC OR PH I REC 2 TO 2.5 HR: Performed by: INTERNAL MEDICINE

## 2021-02-19 PROCEDURE — 74011636637 HC RX REV CODE- 636/637: Performed by: INTERNAL MEDICINE

## 2021-02-19 PROCEDURE — 85730 THROMBOPLASTIN TIME PARTIAL: CPT

## 2021-02-19 PROCEDURE — 99284 EMERGENCY DEPT VISIT MOD MDM: CPT

## 2021-02-19 PROCEDURE — 82962 GLUCOSE BLOOD TEST: CPT

## 2021-02-19 PROCEDURE — C1894 INTRO/SHEATH, NON-LASER: HCPCS | Performed by: INTERNAL MEDICINE

## 2021-02-19 PROCEDURE — 74011250637 HC RX REV CODE- 250/637: Performed by: INTERNAL MEDICINE

## 2021-02-19 RX ORDER — LIDOCAINE HYDROCHLORIDE 10 MG/ML
INJECTION, SOLUTION EPIDURAL; INFILTRATION; INTRACAUDAL; PERINEURAL AS NEEDED
Status: DISCONTINUED | OUTPATIENT
Start: 2021-02-19 | End: 2021-02-19 | Stop reason: HOSPADM

## 2021-02-19 RX ORDER — INSULIN LISPRO 100 [IU]/ML
INJECTION, SOLUTION INTRAVENOUS; SUBCUTANEOUS
Status: DISCONTINUED
Start: 2021-02-19 | End: 2021-02-19 | Stop reason: WASHOUT

## 2021-02-19 RX ORDER — FENTANYL CITRATE 50 UG/ML
INJECTION, SOLUTION INTRAMUSCULAR; INTRAVENOUS AS NEEDED
Status: DISCONTINUED | OUTPATIENT
Start: 2021-02-19 | End: 2021-02-19 | Stop reason: HOSPADM

## 2021-02-19 RX ORDER — VERAPAMIL HYDROCHLORIDE 2.5 MG/ML
INJECTION, SOLUTION INTRAVENOUS AS NEEDED
Status: DISCONTINUED | OUTPATIENT
Start: 2021-02-19 | End: 2021-02-19 | Stop reason: HOSPADM

## 2021-02-19 RX ORDER — MAGNESIUM SULFATE 100 %
4 CRYSTALS MISCELLANEOUS AS NEEDED
Status: DISCONTINUED | OUTPATIENT
Start: 2021-02-19 | End: 2021-02-19 | Stop reason: HOSPADM

## 2021-02-19 RX ORDER — SODIUM CHLORIDE 0.9 % (FLUSH) 0.9 %
5-40 SYRINGE (ML) INJECTION AS NEEDED
Status: DISCONTINUED | OUTPATIENT
Start: 2021-02-19 | End: 2021-02-19 | Stop reason: HOSPADM

## 2021-02-19 RX ORDER — DEXTROSE MONOHYDRATE 25 G/50ML
25-50 INJECTION, SOLUTION INTRAVENOUS AS NEEDED
Status: DISCONTINUED | OUTPATIENT
Start: 2021-02-19 | End: 2021-02-19 | Stop reason: HOSPADM

## 2021-02-19 RX ORDER — ACETAMINOPHEN 500 MG
1000 TABLET ORAL ONCE
Status: COMPLETED | OUTPATIENT
Start: 2021-02-19 | End: 2021-02-19

## 2021-02-19 RX ORDER — SODIUM CHLORIDE 0.9 % (FLUSH) 0.9 %
5-40 SYRINGE (ML) INJECTION EVERY 8 HOURS
Status: DISCONTINUED | OUTPATIENT
Start: 2021-02-19 | End: 2021-02-19 | Stop reason: HOSPADM

## 2021-02-19 RX ORDER — INSULIN LISPRO 100 [IU]/ML
INJECTION, SOLUTION INTRAVENOUS; SUBCUTANEOUS ONCE
Status: COMPLETED | OUTPATIENT
Start: 2021-02-19 | End: 2021-02-19

## 2021-02-19 RX ORDER — MIDAZOLAM HYDROCHLORIDE 1 MG/ML
INJECTION, SOLUTION INTRAMUSCULAR; INTRAVENOUS AS NEEDED
Status: DISCONTINUED | OUTPATIENT
Start: 2021-02-19 | End: 2021-02-19 | Stop reason: HOSPADM

## 2021-02-19 RX ORDER — OXYCODONE AND ACETAMINOPHEN 5; 325 MG/1; MG/1
1 TABLET ORAL
Qty: 12 TAB | Refills: 0 | Status: SHIPPED | OUTPATIENT
Start: 2021-02-19 | End: 2021-02-22

## 2021-02-19 RX ORDER — HEPARIN SODIUM 1000 [USP'U]/ML
INJECTION, SOLUTION INTRAVENOUS; SUBCUTANEOUS AS NEEDED
Status: DISCONTINUED | OUTPATIENT
Start: 2021-02-19 | End: 2021-02-19 | Stop reason: HOSPADM

## 2021-02-19 RX ADMIN — ACETAMINOPHEN 1000 MG: 500 TABLET, FILM COATED ORAL at 09:56

## 2021-02-19 RX ADMIN — INSULIN LISPRO 9 UNITS: 100 INJECTION, SOLUTION INTRAVENOUS; SUBCUTANEOUS at 09:57

## 2021-02-19 NOTE — PERIOP NOTES
Assumed care of pt from cath lab s/p [facility]. No interventions. Right radial TR band in place with no bleeding, swelling or hematoma noted. Pulses palpable. C/o right ankle pain. Some swelling noted. 1179  Right ankle xray completed. 1142  Pt discharged via w/c after post cath recovery to ER for evaluation of c/o right ankle pain an xray report.

## 2021-02-19 NOTE — PERIOP NOTES
Patient's blood glucose 284. Patient took his metformin and farxiga this morning. Dr. Rose Maldonado was notified. Per Dr. Rose Maldonado, no insulin to be given.

## 2021-02-19 NOTE — DISCHARGE INSTRUCTIONS
Cardiac Catheterization/Angiography Discharge Instructions    *Check the puncture site frequently for swelling or bleeding. If you see any bleeding, lie down and apply pressure over the area with a clean town or washcloth. Notify your doctor for any redness, swelling, drainage or oozing from the puncture site. Notify your doctor for any fever or chills. *If the leg or arm with the puncture becomes cold, numb or painful, call Dr Rosalie Kaufman at  [phone]    *Activity should be limited for the next 48 hours. Climb stairs as little as possible and avoid any stooping, bending or strenuous activity for 48 hours. No heavy lifting (anything over 10 pounds) for three days. *Do not drive for 48 hours. *You may resume your usual diet. Drink more fluids than usual.    *Have a responsible person drive you home and stay with you for at least 24 hours after your heart catheterization/angiography. *You may remove the bandage from your Right and Arm in 24 hours. You may shower in 24 hours. No tub baths, hot tubs or swimming for one week. Do not place any lotions, creams, powders, ointments over the puncture site for one week. You may place a clean band-aid over the puncture site each day for 5 days. Change this daily.

## 2021-02-19 NOTE — H&P
Please see clinic note for detail. I saw and examined patient and confirmed above. No interval change. Labs reviewed. Procedure explained to patient and all risk and benefit discussed with patient. Risk, benefit, complication of LHC and possible PCI ( including but not limited to bleeding, infection, heart failure, stroke, MI, emergent bypass surgery, kidney failure, dialysis and death ) were discussed with patient and willing to proceed with procedure. Proceed as planned. History and physical has been reviewed.  There have been no significant clinical changes since the completion of the originally dated History and Physical.  Will be using moderate sedation.    ------------------------------------------------------------------------------------------------------------------

## 2021-02-19 NOTE — PERIOP NOTES
Pre-Op Summary    Pt arrived via car with family/friend and is oriented to time, place, person and situation. Patient with steady gait with cane assistive devices. Visit Vitals  Pulse 72   Temp 98.1 °F (36.7 °C)   Resp 20   Ht 6' 1\" (1.854 m)   Wt 149.4 kg (329 lb 6 oz)   SpO2 95%   BMI 43.46 kg/m²       Peripheral IV located on Left wrist , and right antecubital.     Patients belongings are located with patient. Patient's point of contact is his sister. Number is on chart. Patient has set up Medicaid transport and has the number. They will be leaving and coming back. They will be their ride home.

## 2021-02-19 NOTE — ED NOTES
Patient stated understanding of discharge instructions. Patient was ambulatory upon discharge. Patient received one prescription.

## 2021-02-19 NOTE — Clinical Note
TRANSFER - OUT REPORT:     Verbal report given to: Christina Mathis RN. Report consisted of patient's Situation, Background, Assessment and   Recommendations(SBAR). Opportunity for questions and clarification was provided. Patient transported with a Cardiac Cath Tech / Patient Care Tech. Patient transported to: PACU.

## 2021-02-19 NOTE — ED PROVIDER NOTES
EMERGENCY DEPARTMENT HISTORY AND PHYSICAL EXAM    Date: 2021  Patient Name: Bill Rubio    History of Presenting Illness     Chief Complaint   Patient presents with    Ankle Pain         History Provided By: Patient      Additional History (Context): Bill Rubio is a 44 y.o. male with hypertension, obesity and CAD who presents with complaint of right ankle pain. He fell 2 weeks ago. He was playing with his family when he tripped and fell after a  he was playing with his nieces and nephews. He had pain with weightbearing. Today, he was in the cardiac cath ready to have a procedure and was being dated and was complaining of pain and x-ray was obtained and discovered that he has an ankle fracture. Patient was brought down to the emergency department. Denies numbness weakness. PCP: Solo Mclean NP    Current Outpatient Medications   Medication Sig Dispense Refill    oxyCODONE-acetaminophen (Percocet) 5-325 mg per tablet Take 1 Tab by mouth every six (6) hours as needed for Pain for up to 3 days. Max Daily Amount: 4 Tabs. 12 Tab 0    predniSONE (DELTASONE) 50 mg tablet 50 mg prednisone by mouth 13 hours, 7 hours, 1 hours prior to procedure. 3 Tab 0    diphenhydrAMINE (Benadryl Allergy) 25 mg tablet 50 mg of Benadryl 1 hour prior to procedure. 2 Tab 0    furosemide (LASIX) 40 mg tablet Take 1 Tab by mouth daily. 90 Tab 3    amLODIPine (NORVASC) 10 mg tablet Take 1 Tab by mouth daily. 90 Tab 1    carvediloL (COREG) 12.5 mg tablet Take 1 Tab by mouth two (2) times daily (with meals). 180 Tab 1    isosorbide mononitrate ER (IMDUR) 30 mg tablet Take 1 Tab by mouth every morning. 90 Tab 1    losartan (COZAAR) 100 mg tablet Take 1 Tab by mouth daily. 90 Tab 1    rivaroxaban (XARELTO) 20 mg tab tablet Take 1 Tab by mouth daily (with breakfast). 30 Tab 3    spironolactone (ALDACTONE) 50 mg tablet Take 1 Tab by mouth daily.  90 Tab 1    glucose blood VI test strips (ASCENSIA AUTODISC VI, ONE TOUCH ULTRA TEST VI) strip 1 Each two (2) times a day.  OneTouch Verio test strips strip       lancets misc 1 Each two (2) times a day.  dapagliflozin (Farxiga) 10 mg tab tablet Take 1 Tab by mouth daily. 90 Tab 1    hydrALAZINE (APRESOLINE) 50 mg tablet Take 1 Tab by mouth three (3) times daily. 270 Tab 1    metFORMIN (GLUCOPHAGE) 500 mg tablet Take 1 Tab by mouth two (2) times a day. 180 Tab 1    atorvastatin (LIPITOR) 40 mg tablet Take 1 Tab by mouth nightly. 90 Tab 1    fluticasone (FLOVENT HFA) 110 mcg/actuation inhaler Take 2 Puffs by inhalation every twelve (12) hours. 3 Inhaler 3    albuterol (PROVENTIL HFA, VENTOLIN HFA, PROAIR HFA) 90 mcg/actuation inhaler Take 1-2 puffs every 4-6 hrs prn shortness of breath 1 Inhaler 3       Past History     Past Medical History:  Past Medical History:   Diagnosis Date    [facility]     Asthma     Cardiomyopathy ([facility]     40% (2011), 60%(02/14)    Chronic obstructive pulmonary disease (HCC)     Diabetes ([facility]     Dyslipidemia     Fracture     right arm    GERD (gastroesophageal reflux disease)     Hx of cardiac cath 02/2021    Hypercholesterolemia     Hypertension     Sleep apnea     no cpap        Past Surgical History:  Past Surgical History:   Procedure Laterality Date    HX ORTHOPAEDIC      surgery for wrist and forearm    HX WISDOM TEETH EXTRACTION         Family History:  Family History   Problem Relation Age of Onset    Cancer Mother     No Known Problems Father        Social History:  Social History     Tobacco Use    Smoking status: Former Smoker     Types: Cigars    Smokeless tobacco: Never Used    Tobacco comment: black N mild    Substance Use Topics    Alcohol use: Yes     Alcohol/week: 0.0 standard drinks     Comment: Socially    Drug use: No       Allergies:   Allergies   Allergen Reactions    Shellfish Derived Hives         Review of Systems   Review of Systems   Musculoskeletal: Positive for arthralgias, gait problem and joint swelling. Neurological: Negative for weakness and numbness. All Other Systems Negative  Physical Exam     Vitals:    02/19/21 1204   BP: 127/69   Pulse: 62   Resp: 16   Temp: 98.3 °F (36.8 °C)   SpO2: 98%   Weight: 149.7 kg (330 lb)   Height: 6' 1\" (1.854 m)     Physical Exam  Vitals signs and nursing note reviewed. Constitutional:       General: He is not in acute distress. Appearance: He is well-developed. He is not ill-appearing, toxic-appearing or diaphoretic. HENT:      Head: Normocephalic and atraumatic. Neck:      Musculoskeletal: Normal range of motion and neck supple. Thyroid: No thyromegaly. Vascular: No carotid bruit. Trachea: No tracheal deviation. Cardiovascular:      Rate and Rhythm: Normal rate and regular rhythm. Heart sounds: Normal heart sounds. No murmur. No friction rub. No gallop. Pulmonary:      Effort: Pulmonary effort is normal. No respiratory distress. Breath sounds: Normal breath sounds. No stridor. No wheezing or rales. Chest:      Chest wall: No tenderness. Abdominal:      General: There is no distension. Palpations: Abdomen is soft. There is no mass. Tenderness: There is no abdominal tenderness. There is no guarding or rebound. Musculoskeletal: Normal range of motion. General: Swelling, tenderness and signs of injury present. Comments: Right ankle: There is diffuse tenderness throughout the joint line more laterally. Moderate swelling throughout the ankle joint. DP PT pulses palpable. Nontender posteriorly. Nontender foot and knee. No open wounds. Skin:     General: Skin is warm and dry. Coloration: Skin is not pale. Neurological:      Mental Status: He is alert. Psychiatric:         Speech: Speech normal.         Behavior: Behavior normal.         Thought Content:  Thought content normal.         Judgment: Judgment normal.          Diagnostic Study Results     Labs -     Recent Results (from the past 12 hour(s))   PTT    Collection Time: 02/19/21  8:05 AM   Result Value Ref Range    aPTT 28.7 23.0 - 36.4 SEC   PROTHROMBIN TIME + INR    Collection Time: 02/19/21  8:05 AM   Result Value Ref Range    Prothrombin time 14.2 11.5 - 15.2 sec    INR 1.1 0.8 - 1.2     GLUCOSE, POC    Collection Time: 02/19/21  8:34 AM   Result Value Ref Range    Glucose (POC) 284 (H) 70 - 110 mg/dL   GLUCOSE, POC    Collection Time: 02/19/21  9:48 AM   Result Value Ref Range    Glucose (POC) 265 (H) 70 - 110 mg/dL       Radiologic Studies -   XR ANKLE RT MIN 3 V   Final Result         1. Trans-syndesmotic distal fibular fracture deltoid ligamentous insufficiency   with evidence of likely ligamentous avulsion injuries from both the fibular tip   and medial malleolus. 2. Diffuse soft tissue swelling and ankle joint effusion. CT Results  (Last 48 hours)    None        CXR Results  (Last 48 hours)    None            Medical Decision Making   I am the first provider for this patient. I reviewed the vital signs, available nursing notes, past medical history, past surgical history, family history and social history. Vital Signs-Reviewed the patient's vital signs. Procedures:  Procedures    Provider Notes (Medical Decision Making): Still should be fibular injury fracture discussed with Ortho BRENDA Palacios. Placed in splint and refer to outpatient management with [facility] orthopedics. Treat his pain. Splint applied by tech to right ankle; excellent position. N/v intact before and after application. MED RECONCILIATION:  No current facility-administered medications for this encounter. Current Outpatient Medications   Medication Sig    oxyCODONE-acetaminophen (Percocet) 5-325 mg per tablet Take 1 Tab by mouth every six (6) hours as needed for Pain for up to 3 days. Max Daily Amount: 4 Tabs.     predniSONE (DELTASONE) 50 mg tablet 50 mg prednisone by mouth 13 hours, 7 hours, 1 hours prior to procedure.  diphenhydrAMINE (Benadryl Allergy) 25 mg tablet 50 mg of Benadryl 1 hour prior to procedure.  furosemide (LASIX) 40 mg tablet Take 1 Tab by mouth daily.  amLODIPine (NORVASC) 10 mg tablet Take 1 Tab by mouth daily.  carvediloL (COREG) 12.5 mg tablet Take 1 Tab by mouth two (2) times daily (with meals).  isosorbide mononitrate ER (IMDUR) 30 mg tablet Take 1 Tab by mouth every morning.  losartan (COZAAR) 100 mg tablet Take 1 Tab by mouth daily.  rivaroxaban (XARELTO) 20 mg tab tablet Take 1 Tab by mouth daily (with breakfast).  spironolactone (ALDACTONE) 50 mg tablet Take 1 Tab by mouth daily.  glucose blood VI test strips (ASCENSIA AUTODISC VI, ONE TOUCH ULTRA TEST VI) strip 1 Each two (2) times a day.  OneTouch Verio test strips strip     lancets misc 1 Each two (2) times a day.  dapagliflozin (Farxiga) 10 mg tab tablet Take 1 Tab by mouth daily.  hydrALAZINE (APRESOLINE) 50 mg tablet Take 1 Tab by mouth three (3) times daily.  metFORMIN (GLUCOPHAGE) 500 mg tablet Take 1 Tab by mouth two (2) times a day.  atorvastatin (LIPITOR) 40 mg tablet Take 1 Tab by mouth nightly.  fluticasone (FLOVENT HFA) 110 mcg/actuation inhaler Take 2 Puffs by inhalation every twelve (12) hours.  albuterol (PROVENTIL HFA, VENTOLIN HFA, PROAIR HFA) 90 mcg/actuation inhaler Take 1-2 puffs every 4-6 hrs prn shortness of breath       Disposition:  home    DISCHARGE NOTE:   1:45 PM    Pt has been reexamined. Patient has no new complaints, changes, or physical findings. Care plan outlined and precautions discussed. Results of x-rays were reviewed with the patient. All medications were reviewed with the patient; will d/c home with percocet. All of pt's questions and concerns were addressed. Patient was instructed and agrees to follow up with ortho, as well as to return to the ED upon further deterioration. Patient is ready to go home.     Follow-up Information     Follow up With Specialties Details Why Contact Info    Kaushal Goodwin MD Orthopedic Surgery Schedule an appointment as soon as possible for a visit in 3 days  [address] [facility] DEPT Emergency Medicine  If symptoms worsen return immediately [address]  [phone]          Current Discharge Medication List      CONTINUE these medications which have CHANGED    Details   oxyCODONE-acetaminophen (Percocet) 5-325 mg per tablet Take 1 Tab by mouth every six (6) hours as needed for Pain for up to 3 days. Max Daily Amount: 4 Tabs. Qty: 12 Tab, Refills: 0    Associated Diagnoses: Closed right ankle fracture, initial encounter               Diagnosis     Clinical Impression:   1.  Closed right ankle fracture, initial encounter

## 2021-02-19 NOTE — ED TRIAGE NOTES
R ankle pain, onset 2 weeks after fall, no LOC, was having a cardiac cath today, C/O r ankle pain so xray was ordered, fracture found

## 2021-02-22 ENCOUNTER — PATIENT OUTREACH (OUTPATIENT)
Dept: CASE MANAGEMENT | Age: 51
End: 2021-02-22

## 2021-02-22 NOTE — PROGRESS NOTES
Patient contacted regarding recent discharge and COVID-19 risk. Discussed COVID-19 related testing which was not done at this time. Test results were not done. Patient informed of results, if available? n/a    Outreach made within 2 business days of discharge: Yes    Care Transition Nurse/ Ambulatory Care Manager/ LPN Care Coordinator contacted the patient by telephone to perform post discharge assessment. Verified name and  with patient as identifiers. Patient has following risk factors of: asthma and diabetes. CTN/ACM/LPN reviewed discharge instructions, medical action plan and red flags related to discharge diagnosis. Reviewed and educated them on any new and changed medications related to discharge diagnosis. Advised obtaining a 90-day supply of all daily and as-needed medications. Advance Care Planning:   Does patient have an Advance Directive: not on file    Education provided regarding infection prevention, and signs and symptoms of COVID-19 and when to seek medical attention with patient who verbalized understanding. Discussed exposure protocols and quarantine from [address] you at higher risk for severe illness  and given an opportunity for questions and concerns. The patient agrees to contact the COVID-19 hotline [phone] or PCP office for questions related to their healthcare. CTN/ACM/LPN provided contact information for future reference. From CDC: Are you at higher risk for severe illness?  Wash your hands often.  Avoid close contact (6 feet, which is about two arm lengths) with people who are sick.  Put distance between yourself and other people if COVID-19 is spreading in your community.  Clean and disinfect frequently touched surfaces.  Avoid all cruise travel and non-essential air travel.  Call your healthcare professional if you have concerns about COVID-19 and your underlying condition or if you are sick.     For more information on steps you can take to protect yourself, see CDC's How to Protect Yourself      Patient/family/caregiver given information for [employer] Loop and agrees to enroll yes  Patient's preferred e-mail:  [email or web address]  Patient's preferred phone number: [phone]  Based on Loop alert triggers, patient will be contacted by nurse care manager for worsening symptoms. Pt will be further monitored by COVID Loop Team, pending account activation by patient.  based on severity of symptoms and risk factors.

## 2021-02-25 ENCOUNTER — PATIENT OUTREACH (OUTPATIENT)
Dept: CASE MANAGEMENT | Age: 51
End: 2021-02-25

## 2021-02-25 NOTE — PROGRESS NOTES
Date/Time: 2/25/2021 2:54 PM    E-mail sent via LOOP to encourage activation of account enrollment. E-mail contains contact information for assistance with questions and help with enrollment as necessary.

## 2021-02-26 ENCOUNTER — TELEPHONE (OUTPATIENT)
Dept: CARDIOLOGY CLINIC | Age: 51
End: 2021-02-26

## 2021-02-26 NOTE — LETTER
2/26/2021 Mr. Bette Yang [facility] [address] To whom it may concern: 
 
Bette Yang was seen in our office on January 7, 2021 for cardiac evaluation. From a cardiac standpoint he is low to intermediate risk for his upcoming right ankle procedure. It is my recommendation that he hold his xarelto two days prior to procedure. Please feel free to contact our office if you have any questions regarding this patient. Sincerely, Keenan Dhaliwal MD

## 2021-02-26 NOTE — TELEPHONE ENCOUNTER
Incoming from [facility]. Two patient Identifiers confirmed. Advised pt will be under general anesthesia. Pt is to have sx on right ankle on 03/10/2021 with  Dr Jareth Watts. She advised if pt was cleared to fax it to [phone] Att: Pelon Krishnan.

## 2021-03-03 ENCOUNTER — TELEPHONE (OUTPATIENT)
Dept: FAMILY MEDICINE CLINIC | Age: 51
End: 2021-03-03

## 2021-03-03 NOTE — TELEPHONE ENCOUNTER
I spoke to Gustavo Gotti at Dr. Durga Pittman office. I did notify her that provider is out of office until Monday 3/8/21. She stated that is fine to get back to her than. Pt stated Dr. Brina Ware the cardiologist did state that is okay to stop for 2 days prior but need clarification from pcp. Will fwd to NP [employer] for review.

## 2021-03-03 NOTE — TELEPHONE ENCOUNTER
Received a call from [facility] at  [facility] stating that patient Kwaku Patel is due to have surgery on 3/10/21. [facility] stated that his cardiologist cleared him for surgery the cardiologist wants to know from Dr. Lolly Min  if it's ok for patient to stop his Xarelto.  [facility] is requesting a return call back at [phone]

## 2021-03-08 NOTE — TELEPHONE ENCOUNTER
[facility] called to follow up on previous office notes for patient.   Fax number is [phone]

## 2021-03-09 NOTE — TELEPHONE ENCOUNTER
Returned call to [facility], [facility]. Patient with fractured ankle per ED visit on 2/19/20. Although this is not an emergency surgery it is not elective and should be performed ASAP. He has clearance from cardiology. Dr. Edgard Sepulveda wanted to make sure there is nothing else pertinent to be known about the patient prior to surgery. Will contact Dr. Carissa Redmond and review case.

## 2021-03-10 NOTE — TELEPHONE ENCOUNTER
Called Gus back. Informed her, after discussion with Dr. Emeli Salazar, she cannot clear him for surgery due to the fact he has diabetes and the last A1C we have is from 10/6/2020 and is 11. 4. we would need to check A1C NOW and if it is greater than 7.5 the surgery would need to be delayed. Gustavo Gotti was not available, left message with . Gustavo Gotti will call me back.

## 2021-05-13 DIAGNOSIS — E11.9 CONTROLLED TYPE 2 DIABETES MELLITUS WITHOUT COMPLICATION, WITHOUT LONG-TERM CURRENT USE OF INSULIN (HCC): ICD-10-CM

## 2021-05-13 DIAGNOSIS — E78.5 DYSLIPIDEMIA: ICD-10-CM

## 2021-05-13 DIAGNOSIS — I48.91 ATRIAL FIBRILLATION, UNSPECIFIED TYPE (HCC): ICD-10-CM

## 2021-05-13 DIAGNOSIS — I50.9 CHRONIC CONGESTIVE HEART FAILURE, UNSPECIFIED HEART FAILURE TYPE (HCC): ICD-10-CM

## 2021-05-13 DIAGNOSIS — I10 ESSENTIAL HYPERTENSION: ICD-10-CM

## 2021-05-13 NOTE — TELEPHONE ENCOUNTER
Requested Prescriptions     Pending Prescriptions Disp Refills    losartan (COZAAR) 100 mg tablet 90 Tab 1     Sig: Take 1 Tab by mouth daily.  amLODIPine (NORVASC) 10 mg tablet 90 Tab 1     Sig: Take 1 Tab by mouth daily.  atorvastatin (LIPITOR) 40 mg tablet 90 Tab 1     Sig: Take 1 Tab by mouth nightly.  carvediloL (COREG) 12.5 mg tablet 180 Tab 1     Sig: Take 1 Tab by mouth two (2) times daily (with meals).  dapagliflozin (Farxiga) 10 mg tab tablet 90 Tab 1     Sig: Take 1 Tab by mouth daily.  isosorbide mononitrate ER (IMDUR) 30 mg tablet 90 Tab 1     Sig: Take 1 Tab by mouth every morning.  rivaroxaban (XARELTO) 20 mg tab tablet 30 Tab 3     Sig: Take 1 Tab by mouth daily (with breakfast).

## 2021-05-14 NOTE — TELEPHONE ENCOUNTER
PCP: Konrad Leach NP    Last appt: 1/7/2021  Future Appointments   Date Time Provider Kelvin Arredondo   6/15/2021 10:45 AM Noah Faulkner MD [facility] BS AMB   7/12/2021  3:45 PM Konrad Leach NP [facility] BS AMB       Requested Prescriptions     Pending Prescriptions Disp Refills    losartan (COZAAR) 100 mg tablet 90 Tab 1     Sig: Take 1 Tab by mouth daily.  amLODIPine (NORVASC) 10 mg tablet 90 Tab 1     Sig: Take 1 Tab by mouth daily.  atorvastatin (LIPITOR) 40 mg tablet 90 Tab 1     Sig: Take 1 Tab by mouth nightly.  carvediloL (COREG) 12.5 mg tablet 180 Tab 1     Sig: Take 1 Tab by mouth two (2) times daily (with meals).  isosorbide mononitrate ER (IMDUR) 30 mg tablet 90 Tab 1     Sig: Take 1 Tab by mouth every morning.  rivaroxaban (XARELTO) 20 mg tab tablet 30 Tab 3     Sig: Take 1 Tab by mouth daily (with breakfast).            Other Comments:

## 2021-05-17 RX ORDER — LOSARTAN POTASSIUM 100 MG/1
100 TABLET ORAL DAILY
Qty: 90 TAB | Refills: 1 | Status: SHIPPED | OUTPATIENT
Start: 2021-05-17 | End: 2021-12-01 | Stop reason: SDUPTHER

## 2021-05-17 RX ORDER — ATORVASTATIN CALCIUM 40 MG/1
40 TABLET, FILM COATED ORAL
Qty: 90 TAB | Refills: 1 | Status: SHIPPED | OUTPATIENT
Start: 2021-05-17 | End: 2021-09-03 | Stop reason: SDUPTHER

## 2021-05-17 RX ORDER — CARVEDILOL 12.5 MG/1
12.5 TABLET ORAL 2 TIMES DAILY WITH MEALS
Qty: 180 TAB | Refills: 1 | Status: SHIPPED | OUTPATIENT
Start: 2021-05-17 | End: 2021-06-15 | Stop reason: ALTCHOICE

## 2021-05-17 RX ORDER — ISOSORBIDE MONONITRATE 30 MG/1
30 TABLET, EXTENDED RELEASE ORAL
Qty: 90 TAB | Refills: 1 | Status: SHIPPED | OUTPATIENT
Start: 2021-05-17 | End: 2021-12-01 | Stop reason: SDUPTHER

## 2021-05-17 RX ORDER — AMLODIPINE BESYLATE 10 MG/1
10 TABLET ORAL DAILY
Qty: 90 TAB | Refills: 1 | Status: SHIPPED | OUTPATIENT
Start: 2021-05-17 | End: 2021-12-01 | Stop reason: SDUPTHER

## 2021-06-15 ENCOUNTER — OFFICE VISIT (OUTPATIENT)
Dept: CARDIOLOGY CLINIC | Age: 51
End: 2021-06-15
Payer: MEDICAID

## 2021-06-15 VITALS
WEIGHT: 315 LBS | HEART RATE: 93 BPM | TEMPERATURE: 97.8 F | BODY MASS INDEX: 41.75 KG/M2 | OXYGEN SATURATION: 98 % | SYSTOLIC BLOOD PRESSURE: 149 MMHG | DIASTOLIC BLOOD PRESSURE: 91 MMHG | RESPIRATION RATE: 16 BRPM | HEIGHT: 73 IN

## 2021-06-15 DIAGNOSIS — I10 ESSENTIAL HYPERTENSION: Primary | ICD-10-CM

## 2021-06-15 DIAGNOSIS — I48.91 ATRIAL FIBRILLATION, UNSPECIFIED TYPE (HCC): ICD-10-CM

## 2021-06-15 DIAGNOSIS — I50.9 CHRONIC CONGESTIVE HEART FAILURE, UNSPECIFIED HEART FAILURE TYPE (HCC): ICD-10-CM

## 2021-06-15 PROCEDURE — 99214 OFFICE O/P EST MOD 30 MIN: CPT | Performed by: INTERNAL MEDICINE

## 2021-06-15 RX ORDER — CARVEDILOL 25 MG/1
25 TABLET ORAL 2 TIMES DAILY WITH MEALS
Qty: 180 TABLET | Refills: 3 | Status: SHIPPED | OUTPATIENT
Start: 2021-06-15 | End: 2022-09-23 | Stop reason: SDUPTHER

## 2021-06-15 NOTE — PATIENT INSTRUCTIONS
New Medication/Medication Changes  Incr ease coreg to 25 mg 1 tab bid    **please allow 24-48 hrs for medication to be escribed to pharmacy** If you need any refills on medications please contact your pharmacy so that the request can be escribed to the provider for review.

## 2021-06-15 NOTE — LETTER
6/15/2021 Patient: Lakeshia Moeller YOB: 1970 Date of Visit: 6/15/2021 Rona Holbrook NP 
[address] [id number] [address] [employer] Dear Rona Holbrook NP, Thank you for referring Mr. Lakeshia Moeller to [facility] AT [facility] for evaluation. My notes for this consultation are attached. If you have questions, please do not hesitate to call me. I look forward to following your patient along with you. Sincerely, Triny Kincaid MD

## 2021-06-15 NOTE — PROGRESS NOTES
Josie Bush presents today for   Chief Complaint   Patient presents with    Follow-up     overdue follow up        Josie Bush preferred language for health care discussion is english/other. Is someone accompanying this pt? no    Is the patient using any DME equipment during OV? no    Depression Screening:  3 most recent PHQ Screens 11/2/2020   Little interest or pleasure in doing things Not at all   Feeling down, depressed, irritable, or hopeless Not at all   Total Score PHQ 2 0       Learning Assessment:  Learning Assessment 11/2/2020   PRIMARY LEARNER Patient   HIGHEST LEVEL OF EDUCATION - PRIMARY LEARNER  2 YEARS [address] [employer] LEARNER NONE   CO-LEARNER CAREGIVER No   PRIMARY LANGUAGE ENGLISH   LEARNER PREFERENCE PRIMARY DEMONSTRATION     -   ANSWERED BY Erendira Thornton   RELATIONSHIP SELF       Abuse Screening:  Abuse Screening Questionnaire 11/2/2020   Do you ever feel afraid of your partner? N   Are you in a relationship with someone who physically or mentally threatens you? N   Is it safe for you to go home? Y       Fall Risk  Fall Risk Assessment, last 12 mths 11/2/2020   Able to walk? Yes   Fall in past 12 months? No       Pt currently taking Anticoagulant therapy? Xarelto     Coordination of Care:  1. Have you been to the ER, urgent care clinic since your last visit? Hospitalized since your last visit? no    2. Have you seen or consulted any other health care providers outside of the [address] [place] [address] since your last visit? Include any pap smears or colon screening.  no

## 2021-06-15 NOTE — PROGRESS NOTES
[facility]    Mr. Yecenia Castro is a 44 y.o. male with history of cardiomyopathy, hypertension, atrial fibrillation, hyperlipidemia, obesity and COPD. Patient is here today for follow-up appointment. He is limited in terms of functional capacity because of right lower extremity pain and fracture. He is in cast. He is supposed to be in that for another next couple months. He denies any symptoms to suggest angina. He uses 2 pillows at night. He denies any significant lower extremity swelling. He takes his medications regularly. He denies presyncope or syncope    Past Medical History:   Diagnosis Date    [facility]     Asthma     Cardiomyopathy ([facility]     40% (2011), 60%(02/14)    Chronic obstructive pulmonary disease (HCC)     Diabetes ([facility]     Dyslipidemia     Fracture     right arm    GERD (gastroesophageal reflux disease)     Hx of cardiac cath 02/2021    Hypercholesterolemia     Hypertension     Sleep apnea     no cpap          Past Surgical History:   Procedure Laterality Date    HX ORTHOPAEDIC      surgery for wrist and forearm    HX WISDOM TEETH EXTRACTION         Current Outpatient Medications   Medication Sig    losartan (COZAAR) 100 mg tablet Take 1 Tab by mouth daily.  amLODIPine (NORVASC) 10 mg tablet Take 1 Tab by mouth daily.  atorvastatin (LIPITOR) 40 mg tablet Take 1 Tab by mouth nightly.  carvediloL (COREG) 12.5 mg tablet Take 1 Tab by mouth two (2) times daily (with meals).  isosorbide mononitrate ER (IMDUR) 30 mg tablet Take 1 Tab by mouth every morning.  rivaroxaban (XARELTO) 20 mg tab tablet Take 1 Tab by mouth daily (with breakfast).  metFORMIN (GLUCOPHAGE) 500 mg tablet Take 1 Tab by mouth two (2) times a day.  hydrALAZINE (APRESOLINE) 50 mg tablet Take 1 Tab by mouth three (3) times daily.  spironolactone (ALDACTONE) 50 mg tablet Take 1 Tab by mouth daily.     predniSONE (DELTASONE) 50 mg tablet 50 mg prednisone by mouth 13 hours, 7 hours, 1 hours prior to procedure.  diphenhydrAMINE (Benadryl Allergy) 25 mg tablet 50 mg of Benadryl 1 hour prior to procedure.  furosemide (LASIX) 40 mg tablet Take 1 Tab by mouth daily.  glucose blood VI test strips (ASCENSIA AUTODISC VI, ONE TOUCH ULTRA TEST VI) strip 1 Each two (2) times a day.  OneTouch Verio test strips strip     lancets misc 1 Each two (2) times a day.  dapagliflozin (Farxiga) 10 mg tab tablet Take 1 Tab by mouth daily.  fluticasone (FLOVENT HFA) 110 mcg/actuation inhaler Take 2 Puffs by inhalation every twelve (12) hours.  albuterol (PROVENTIL HFA, VENTOLIN HFA, PROAIR HFA) 90 mcg/actuation inhaler Take 1-2 puffs every 4-6 hrs prn shortness of breath     No current facility-administered medications for this visit. Allergies and Sensitivities:  Allergies   Allergen Reactions    Shellfish Derived Hives       Family History:  Family History   Problem Relation Age of Onset    Cancer Mother     No Known Problems Father        Social History:  Social History     Tobacco Use    Smoking status: Former Smoker     Types: Cigars    Smokeless tobacco: Never Used    Tobacco comment: black N mild    Substance Use Topics    Alcohol use: Yes     Alcohol/week: 0.0 standard drinks     Comment: Socially    Drug use: No     He  reports that he has quit smoking. His smoking use included cigars. He has never used smokeless tobacco.  He  reports current alcohol use. Review of Systems:  Cardiac symptoms as noted above in HPI. All others negative.     Physical Exam:  BP Readings from Last 3 Encounters:   06/15/21 (!) 192/93   02/19/21 127/69   02/19/21 119/68         Pulse Readings from Last 3 Encounters:   06/15/21 93   02/19/21 62   02/19/21 (!) 57          Wt Readings from Last 3 Encounters:   06/15/21 143.7 kg (316 lb 12.8 oz)   02/19/21 149.7 kg (330 lb)   02/19/21 149.4 kg (329 lb 6 oz)       Constitutional: Oriented to person, place, and time. HENT: Head: Normocephalic and atraumatic. Neck: No JVD present. Carotid bruit is not appreciated. Cardiovascular: Regular rhythm. No murmur, gallop or rubs appreciated  Lung: Breath sounds normal. No respiratory distress. No ronchi or rales appreciated  Abdominal: No tenderness. No rebound and no guarding. Musculoskeletal: There is no lower extremity edema. No cynosis   right lower extremity under cast    Review of Data  LABS:   Lab Results   Component Value Date/Time    Sodium 136 02/12/2021 12:14 PM    Potassium 4.9 02/12/2021 12:14 PM    Chloride 106 02/12/2021 12:14 PM    CO2 24 02/12/2021 12:14 PM    Glucose 362 (H) 02/12/2021 12:14 PM    BUN 24 (H) 02/12/2021 12:14 PM    Creatinine 1.39 (H) 02/12/2021 12:14 PM     No flowsheet data found. Lab Results   Component Value Date/Time    ALT (SGPT) 30 02/12/2021 12:14 PM     Lab Results   Component Value Date/Time    Hemoglobin A1c, External 11.4 10/06/2020 12:00 AM       EKG  (05/16) Sinus rhythm at 74 beats per minute, left ventricular hypertrophy with repolarization abnormality, nonspecific ST-T changes, normal WV and QRS interval.  I personally reviewed and reported this EKG. ECHO (10/20)  NORMAL LEFT VENTRICULAR CAVITY SIZE AND SYSTOLIC FUNCTION WITH A CALCULATED EJECTION   FRACTION OF 56%. SEVERE DIASTOLIC DYSFUNCTION. MODERATE SEPTAL AND SEVERE INFEROLATERAL HYPERTROPHY. HYPOKINESIS OF THE BASAL TO MID INFERIOR WALL SEGMENTS. SEVERELY DILATED LEFT ATRIUM. NORMAL RIGHT HEART SIZE AND SYSTOLIC FUNCTION. DILATED INFERIOR VENA CAVA (2.4 CM) WITH RESPIRATORY COLLAPSE. MILD PULMONIC REGURGITATION. TRACE MITRAL REGURGITATION. UNABLE TO ASSESS THE PULMONARY ARTERY PRESSURE DUE TO INSIGNIFICANT TRICUSPID REGURGITATION. DILATED AORTIC ROOT DIAMETER (3.8 CM). DILATED ASCENDING AORTA (3.6 CM). TRIVIAL PERICARDIAL EFFUSION NOTED WITHOUT OBVIOUS EVIDENCE OF HEMODYNAMIC COMPROMISE.    OTHER FINDINGS AS NOTED BELOW. COMPARISON TO THE PREVIOUS REPORT DATED 6/25/2018:   1. THE EF HAS INCREASED FROM 50% TO 56%. THE WALL MOTION ABNORMALITIES ARE A NEW FINDING. 2. THE DILATED AORTIC ROOT AND ASCENDING AORTA ARE NEW FINDINGS. STRESS TEST (12/20)  · Baseline ECG: Normal sinus rhythm, non-specific ST-T wave abnormalities. · Gated SPECT: Left ventricular function post-stress was abnormal. Calculated ejection fraction is 39%. · Mild-moderate diffuse hypokinesis with possible more pronounced inferior/inferoseptal hypokinesis. · Myocardial perfusion imaging defect 1: There is a defect that is small to medium in size present in the inferior and inferolateral location(s) that is non-reversible. The possibility of artifact and infarction cannot be excluded. · Myocardial perfusion imaging supports an intermediate risk stress test.    CARDIAC CATH (02/2021)  Left Main   The vessel is angiographically normal.   Left Anterior Descending   The vessel is large. The vessel exhibits minimal luminal irregularities. The vessel is tortuous. Left Circumflex   The vessel is large. The vessel exhibits minimal luminal irregularities. The vessel is tortuous. OM and LPDA: Minimal luminal irregularities. Right Coronary Artery   The vessel is large. The vessel exhibits minimal luminal irregularities. The vessel is tortuous. Left Ventricle The left ventricular size is normal. LV systolic pressure is normal. LV end diastolic pressure is normal. LV EDP is: 10. The estimated EF = grossly normal. There is no evidence of mitral regurgitation. Aortic Valve There is no aortic valve stenosis. IMPRESSION & PLAN:  Mr. Marvel Ramirez is a 44 y.o. male with a history of congestive heart failure, atrial fibrillation, obesity, hypertension and hyperlipidemia, COPD. Cardiomyopathy:   Mr. Marvel Ramirez, according to record, has congestive heart failure dating back to 2011 LVEF 40%. LVEF was 60% in 2014  LVEF 55% in 10/2020.   Cardiac catheterization in 02/2021 without any obstructive coronary artery disease. This is hypertensive cardiomyopathy  No evidence of significant fluid overload. Currently on losartan, amlodipine, Coreg, Imdur, spironolactone. He is taking Lasix as needed. Will increase Coreg to 25 mg twice daily for better blood pressure control    Atrial fibrillation: This is paroxysmal in nature. Continue Coreg but increasing dose to 25 mg twice daily  He is on Xarelto    Hypertension:  His blood pressure today is 192/93. Will increase Coreg to 25 mg twice daily. Continue rest of the medication same. He checks his blood pressure at home regularly. Usually blood pressure 150-160 at home. Hyperlipidemia: Continue atorvastatin    Obesity: He weighs 325-->316 pounds. Importance of diet and exercise was discussed with the patient. This plan was discussed with patient who is in agreement. Thank you for allowing me to participate in patient care. Please feel free to call me if you have any question or concern. Logan Glover MD  Please note: This document has been produced using voice recognition software. Unrecognized errors in transcription may be present.

## 2021-07-12 ENCOUNTER — OFFICE VISIT (OUTPATIENT)
Dept: FAMILY MEDICINE CLINIC | Age: 51
End: 2021-07-12

## 2021-07-12 VITALS
SYSTOLIC BLOOD PRESSURE: 104 MMHG | WEIGHT: 309 LBS | HEART RATE: 67 BPM | BODY MASS INDEX: 40.95 KG/M2 | HEIGHT: 73 IN | OXYGEN SATURATION: 94 % | DIASTOLIC BLOOD PRESSURE: 63 MMHG | RESPIRATION RATE: 22 BRPM | TEMPERATURE: 97.9 F

## 2021-07-12 DIAGNOSIS — I50.9 CHRONIC CONGESTIVE HEART FAILURE, UNSPECIFIED HEART FAILURE TYPE (HCC): ICD-10-CM

## 2021-07-12 DIAGNOSIS — J44.9 CHRONIC OBSTRUCTIVE PULMONARY DISEASE, UNSPECIFIED COPD TYPE (HCC): ICD-10-CM

## 2021-07-12 DIAGNOSIS — E11.9 CONTROLLED TYPE 2 DIABETES MELLITUS WITHOUT COMPLICATION, WITHOUT LONG-TERM CURRENT USE OF INSULIN (HCC): ICD-10-CM

## 2021-07-12 RX ORDER — ALBUTEROL SULFATE 90 UG/1
AEROSOL, METERED RESPIRATORY (INHALATION)
Qty: 1 INHALER | Refills: 3 | Status: SHIPPED | OUTPATIENT
Start: 2021-07-12 | End: 2021-09-03 | Stop reason: SDUPTHER

## 2021-07-12 NOTE — PROGRESS NOTES
Exam Room 9    1. Have you been to the ER, urgent care clinic since your last visit? Hospitalized since your last visit? No    2. Have you seen or consulted any other health care providers outside of the [address] [place] [address] since your last visit? Include any pap smears or colon screening.  Yes          Health Maintenance Due   Topic Date Due    Hepatitis C Screening  Never done    Foot Exam Q1  Never done    MICROALBUMIN Q1  Never done    Eye Exam Retinal or Dilated  Never done    COVID-19 Vaccine (1) Never done    DTaP/Tdap/Td series (1 - Tdap) Never done    Colorectal Cancer Screening Combo  Never done    Shingrix Vaccine Age 50> (1 of 2) Never done    Lipid Screen  06/25/2019    A1C test (Diabetic or Prediabetic)  01/06/2021

## 2021-08-20 LAB — HBA1C MFR BLD HPLC: 14 %

## 2021-08-23 PROBLEM — Z86.16 HISTORY OF 2019 NOVEL CORONAVIRUS DISEASE (COVID-19): Status: ACTIVE | Noted: 2021-08-23

## 2021-08-27 ENCOUNTER — VIRTUAL VISIT (OUTPATIENT)
Dept: FAMILY MEDICINE CLINIC | Age: 51
End: 2021-08-27

## 2021-08-27 NOTE — PROGRESS NOTES
Called pt to start visit. Stated he was \"trying to rest\", he would like to reschedule the visit. Advised to call the office to reschedule the appointment. He verbalized understanding.

## 2021-08-27 NOTE — PROGRESS NOTES
1st attempt calling patient at 12:32 pm for precheck in for appointment. patient did not answer. Left message stating to call the office for appointment. 2nd attempt calling patient. Patient did not answer.

## 2021-09-03 ENCOUNTER — VIRTUAL VISIT (OUTPATIENT)
Dept: FAMILY MEDICINE CLINIC | Age: 51
End: 2021-09-03
Payer: MEDICAID

## 2021-09-03 DIAGNOSIS — I50.9 CHRONIC CONGESTIVE HEART FAILURE, UNSPECIFIED HEART FAILURE TYPE (HCC): ICD-10-CM

## 2021-09-03 DIAGNOSIS — J44.9 CHRONIC OBSTRUCTIVE PULMONARY DISEASE, UNSPECIFIED COPD TYPE (HCC): ICD-10-CM

## 2021-09-03 DIAGNOSIS — Z11.59 NEED FOR HEPATITIS C SCREENING TEST: ICD-10-CM

## 2021-09-03 DIAGNOSIS — Z86.16 HISTORY OF 2019 NOVEL CORONAVIRUS DISEASE (COVID-19): ICD-10-CM

## 2021-09-03 DIAGNOSIS — E11.9 CONTROLLED TYPE 2 DIABETES MELLITUS WITHOUT COMPLICATION, WITHOUT LONG-TERM CURRENT USE OF INSULIN (HCC): ICD-10-CM

## 2021-09-03 DIAGNOSIS — I48.91 ATRIAL FIBRILLATION, UNSPECIFIED TYPE (HCC): ICD-10-CM

## 2021-09-03 DIAGNOSIS — I10 ESSENTIAL HYPERTENSION: Primary | ICD-10-CM

## 2021-09-03 DIAGNOSIS — E78.5 DYSLIPIDEMIA: ICD-10-CM

## 2021-09-03 PROCEDURE — 99214 OFFICE O/P EST MOD 30 MIN: CPT | Performed by: NURSE PRACTITIONER

## 2021-09-03 RX ORDER — ALBUTEROL SULFATE 90 UG/1
AEROSOL, METERED RESPIRATORY (INHALATION)
Qty: 1 EACH | Refills: 3 | Status: SHIPPED | OUTPATIENT
Start: 2021-09-03

## 2021-09-03 RX ORDER — ATORVASTATIN CALCIUM 40 MG/1
40 TABLET, FILM COATED ORAL
Qty: 90 TABLET | Refills: 1 | Status: SHIPPED | OUTPATIENT
Start: 2021-09-03 | End: 2022-06-23

## 2021-09-03 RX ORDER — SPIRONOLACTONE 50 MG/1
50 TABLET, FILM COATED ORAL DAILY
Qty: 90 TABLET | Refills: 0 | Status: SHIPPED | OUTPATIENT
Start: 2021-09-03 | End: 2021-12-06 | Stop reason: SDUPTHER

## 2021-09-03 RX ORDER — INSULIN GLARGINE 100 [IU]/ML
55 INJECTION, SOLUTION SUBCUTANEOUS
COMMUNITY
Start: 2021-08-24 | End: 2022-07-26 | Stop reason: SDUPTHER

## 2021-09-03 RX ORDER — HYDRALAZINE HYDROCHLORIDE 50 MG/1
50 TABLET, FILM COATED ORAL 3 TIMES DAILY
Qty: 270 TABLET | Refills: 0 | Status: SHIPPED | OUTPATIENT
Start: 2021-09-03 | End: 2022-07-26 | Stop reason: SDUPTHER

## 2021-09-03 RX ORDER — INSULIN LISPRO 100 [IU]/ML
20 INJECTION, SUSPENSION SUBCUTANEOUS 3 TIMES DAILY
COMMUNITY
Start: 2021-08-24 | End: 2021-09-10 | Stop reason: ALTCHOICE

## 2021-09-03 NOTE — PROGRESS NOTES
[address] [place] [address] 86      Tammy Hill is a 44 y.o. male who was seen by synchronous (real-time) audio-video technology on 9/3/2021. Consent: Tammy Hill, who was seen by synchronous (real-time) audio-video technology, and/or his healthcare decision maker, is aware that this patient-initiated, Telehealth encounter on 9/3/2021 is a billable service, with coverage as determined by his insurance carrier. He is aware that he may receive a bill and has provided verbal consent to proceed: Yes. Assessment & Plan:   Diagnoses and all orders for this visit:    1. Essential hypertension  -     hydrALAZINE (APRESOLINE) 50 mg tablet; Take 1 Tablet by mouth three (3) times daily.  -     METABOLIC PANEL, COMPREHENSIVE; Future  Endorses medication compliance, Refill provided, Follow up labs prior to next visit and Blood pressure stable per home checks, continue same medications   2. Dyslipidemia  -     atorvastatin (LIPITOR) 40 mg tablet; Take 1 Tablet by mouth nightly. -     LIPID PANEL; Future  Endorses medication compliance, Refill provided, Follow up labs prior to next visit and Denies abdominal pain or symptoms of myalgia   3. Controlled type 2 diabetes mellitus without complication, without long-term current use of insulin (HCC)  -     dapagliflozin (Farxiga) 10 mg tab tablet; Take 1 Tablet by mouth daily.  -     REFERRAL TO PHARMACIST  -     HEMOGLOBIN A1C WITH EAG; Future  -     MICROALBUMIN, UR, RAND W/ MICROALB/CREAT RATIO; Future  Endorses medication compliance, Follow up labs prior to next visit and home glucose checks are 200-300, will restart farxiga, follow up labs prior to next visit   4. Chronic obstructive pulmonary disease, unspecified COPD type (HCC)  -     albuterol (PROVENTIL HFA, VENTOLIN HFA, PROAIR HFA) 90 mcg/actuation inhaler; Take 1-2 puffs every 4-6 hrs prn shortness of breath  Refill provided   5.  Chronic congestive heart failure, unspecified heart failure type ([facility]  -     dapagliflozin (Farxiga) 10 mg tab tablet; Take 1 Tablet by mouth daily. -     spironolactone (ALDACTONE) 50 mg tablet; Take 1 Tablet by mouth daily. Refill provided   6. History of 2019 novel coronavirus disease (COVID-19)  admitted for 6  days with Covid 19 virus   7. Atrial fibrillation, unspecified type ([facility] [address]  managed by cardiology   8. Need for hepatitis C screening test  -     HEPATITIS C AB; Future  Health maintenance needs addressed     Follow-up and Dispositions    · Return in about 3 months (around 12/3/2021) for DM, HLD, HTN, 30 min, office only, AND, labs prior.    Follow-up and Disposition History              [id number]  Subjective:     Health Maintenance Due   Topic Date Due    Hepatitis C Screening  Never done    Foot Exam Q1  Never done    MICROALBUMIN Q1  Never done    Eye Exam Retinal or Dilated  Never done    COVID-19 Vaccine (1) Never done    DTaP/Tdap/Td series (1 - Tdap) Never done    Colorectal Cancer Screening Combo  Never done    Shingrix Vaccine Age 50> (1 of 2) Never done    Lipid Screen  06/25/2019    A1C test (Diabetic or Prediabetic)  01/06/2021    Flu Vaccine (1) 09/01/2021             Winifred Hilario is a 44 y.o. male who was seen for   Hospital Follow Up and Diabetes (bs fasting 337 )    COVID infection  Diagnosed 8/18/21  Told to wait 1-2 months for covid vaccine  Lingering symptoms: since of smell and taste come and go, cough    DMII-Started on insulin after his hospitalization 8/18/21   Patient reports medication compliance has not been taking farxiga, probably since last visit, has been using insulin since discharge, he has been taking metformin daily  Diabetic diet compliance most of the time  Patient monitors blood sugars regularly TID   Reports am fasting sugars range AM: 250  Afternoon: 300's  PM: 400   Denies hypoglycemic episodes yes  Denies polyuria, polydipsia, paraesthesia, vision changes? no, polyuria  Engaging in daily exercise? No     Diabetic Foot and Eye Exam HM Status   Topic Date Due    Diabetic Foot Care  Never done    Eye Exam  Never done     Hemoglobin A1c, External   Date Value Ref Range Status   10/06/2020 11.4 % Final     Comment:     [facility]   ]  No results found for: CREAU, MCAU2, MCACR  Key Antihyperglycemic Medications             dapagliflozin (Farxiga) 10 mg tab tablet (Taking) Take 1 Tablet by mouth daily. insulin glargine (LANTUS,BASAGLAR) 100 unit/mL (3 mL) inpn (Taking) 55 Units by SubCUTAneous route nightly. insulin lispro protamin-lispro (HUMALOG 75-25 MIX) flexpen (Taking/Discontinued) 20 Units by SubCUTAneous route three (3) times daily. add sliding scale insulin - -200-> 2 units, 201-250->4 units, 251-300-> 6 units, 301-350 > 8 units and call MD for adjustment. metFORMIN (GLUCOPHAGE) 500 mg tablet (Taking) Take 1 Tab by mouth two (2) times a day. Hypertension:   Patient reports taking medications as instructed. yes   Medication side effects noted. no  Headache upon wakening. no   Home BP monitoring in range of 793/01'H systolic. Do you experience chest pain/pressure or SOB with exertion? no  Maintain a low Sodium diet? yes  Key CAD CHF Meds             hydrALAZINE (APRESOLINE) 50 mg tablet (Taking) Take 1 Tablet by mouth three (3) times daily. atorvastatin (LIPITOR) 40 mg tablet (Taking) Take 1 Tablet by mouth nightly. spironolactone (ALDACTONE) 50 mg tablet (Taking) Take 1 Tablet by mouth daily. carvediloL (COREG) 25 mg tablet (Taking) Take 1 Tablet by mouth two (2) times daily (with meals). losartan (COZAAR) 100 mg tablet (Taking) Take 1 Tab by mouth daily. amLODIPine (NORVASC) 10 mg tablet (Taking) Take 1 Tab by mouth daily. isosorbide mononitrate ER (IMDUR) 30 mg tablet (Taking) Take 1 Tab by mouth every morning. rivaroxaban (XARELTO) 20 mg tab tablet (Taking) Take 1 Tab by mouth daily (with breakfast).     furosemide (LASIX) 40 mg tablet (Taking) Take 1 Tab by mouth daily. BUN   Date Value Ref Range Status   02/12/2021 24 (H) 7.0 - 18 MG/DL Final     Creatinine   Date Value Ref Range Status   02/12/2021 1.39 (H) 0.6 - 1.3 MG/DL Final     GFR est AA   Date Value Ref Range Status   02/12/2021 >60 >60 ml/min/1.73m2 Final     Potassium   Date Value Ref Range Status   02/12/2021 4.9 3.5 - 5.5 mmol/L Final       HLD:  Has been compliant with meds  Yes  Compliant with low-fat diet. most of the time    Denies myalgias or other side effects. yes  The ASCVD Risk score (Yg Mccarthy., et al., 2013) failed to calculate for the following reasons:    Cannot find a previous HDL lab    Cannot find a previous total cholesterol lab    No results found for: CHOL, TRIGL, HDL, LDLC]  Key Antihyperlipidemia Meds             atorvastatin (LIPITOR) 40 mg tablet (Taking) Take 1 Tablet by mouth nightly. Prior to Admission medications    Medication Sig Start Date End Date Taking? Authorizing Provider   albuterol (PROVENTIL HFA, VENTOLIN HFA, PROAIR HFA) 90 mcg/actuation inhaler Take 1-2 puffs every 4-6 hrs prn shortness of breath 9/3/21  Yes Cornel Lazaro NP   dapagliflozin Faust Situ) 10 mg tab tablet Take 1 Tablet by mouth daily. 9/3/21  Yes Cornel Lazaro NP   hydrALAZINE (APRESOLINE) 50 mg tablet Take 1 Tablet by mouth three (3) times daily. 9/3/21  Yes Cornel Lazaro NP   insulin glargine (LANTUS,BASAGLAR) 100 unit/mL (3 mL) inpn 55 Units by SubCUTAneous route nightly. 8/24/21  Yes Provider, Historical   atorvastatin (LIPITOR) 40 mg tablet Take 1 Tablet by mouth nightly. 9/3/21  Yes Cornel Lazaro NP   spironolactone (ALDACTONE) 50 mg tablet Take 1 Tablet by mouth daily. 9/3/21  Yes Cornel Lazaro NP   carvediloL (COREG) 25 mg tablet Take 1 Tablet by mouth two (2) times daily (with meals). 6/15/21  Yes Daniel Perez MD   losartan (COZAAR) 100 mg tablet Take 1 Tab by mouth daily.  5/17/21  Yes Daniel Perez MD amLODIPine (NORVASC) 10 mg tablet Take 1 Tab by mouth daily. 5/17/21  Yes Vanessa Franks MD   isosorbide mononitrate ER (IMDUR) 30 mg tablet Take 1 Tab by mouth every morning. 5/17/21  Yes Vanessa Franks MD   rivaroxaban (XARELTO) 20 mg tab tablet Take 1 Tab by mouth daily (with breakfast). 5/17/21  Yes Vanessa Franks MD   metFORMIN (GLUCOPHAGE) 500 mg tablet Take 1 Tab by mouth two (2) times a day. 5/13/21  Yes Roxana Lares NP   furosemide (LASIX) 40 mg tablet Take 1 Tab by mouth daily. 12/21/20  Yes Vanessa Franks MD   OneTouch Verio test strips strip Use to test blood sugar 4 times daily 9/17/21   Roxana Lares NP   lancets [facility] Plus Lancet) 33 gauge misc Use to test blood sugar 4 times daily 9/17/21   Roxana Lares NP   fluticasone furoate (Arnuity Ellipta) 100 mcg/actuation dsdv inhaler Take 1 Puff by inhalation daily. Provider, Historical   ipratropium (Atrovent HFA) 17 mcg/actuation inhaler Take 1 Puff by inhalation every six (6) hours as needed for Wheezing. Provider, Historical   insulin lispro (HUMALOG) 100 unit/mL kwikpen Inject 5 units subcutaneously with meals three times daily.  (STOP humalog 75/25 mix)  Indications: type 2 diabetes mellitus 9/10/21   Roxana Lares NP     Allergies   Allergen Reactions    Shellfish Derived Hives       Patient Active Problem List   Diagnosis Code    Hypertension I10    Arrhythmia I49.9    Asthma J45.909    Chronic combined systolic and diastolic congestive heart failure (HCC) I50.42    Hypercholesterolemia E78.00    GERD (gastroesophageal reflux disease) K21.9    Chronic obstructive pulmonary disease (HCC) J44.9    Dyslipidemia E78.5    Paroxysmal atrial fibrillation (HCC) I48.0    Cardiomyopathy ([facility] [address] I42.9    Controlled type 2 diabetes mellitus without complication, without long-term current use of insulin (HCC) E11.9    History of sleep apnea Z86.69    Hypertensive cardiomegaly I11.9    Mild persistent asthma without complication C71.92    Morbid obesity with body mass index (BMI) of 40.0 to 44.9 in adult ([facility] [address] E66.01, Z68.41    Noncompliance with diet and medication regimen Z91.11, Z91.14    Pulmonary hypertension (HCC) I27.20    History of 2019 novel coronavirus disease (COVID-19) Z86.16     Past Surgical History:   Procedure Laterality Date    HX ORTHOPAEDIC      surgery for wrist and forearm    HX WISDOM TEETH EXTRACTION       Family History   Problem Relation Age of Onset    Cancer Mother     No Known Problems Father      Social History     Tobacco Use    Smoking status: Former Smoker     Types: Cigars    Smokeless tobacco: Never Used    Tobacco comment: black N mild    Substance Use Topics    Alcohol use: Yes     Alcohol/week: 0.0 standard drinks     Comment: Socially       ROS  As stated in HPI, otherwise all others negative. Objective: There were no vitals taken for this visit. General: alert, cooperative, no distress   Mental  status: normal mood, behavior, speech, dress, motor activity, and thought processes, able to follow commands   HENT: NCAT   Neck: no visualized mass   Resp: no respiratory distress   Neuro: no gross deficits   Skin: no discoloration or lesions of concern on visible areas   Psychiatric: normal affect, consistent with stated mood, no evidence of hallucinations     Additional exam findings: We discussed the expected course, resolution and complications of the diagnosis(es) in detail. Medication risks, benefits, costs, interactions, and alternatives were discussed as indicated. I advised him to contact the office if his condition worsens, changes or fails to improve as anticipated. He expressed understanding with the diagnosis(es) and plan. Kemal Dejesus is a 44 y.o. male who was evaluated by a video visit encounter for concerns as above. Patient identification was verified prior to start of the visit.  A caregiver was present when appropriate. Due to this being a TeleHealth encounter (During EUWAQ-78 public health emergency), evaluation of the following organ systems was limited: Vitals/Constitutional/EENT/Resp/CV/GI//MS/Neuro/Skin/Heme-Lymph-Imm. Pursuant to the emergency declaration under the [address] [place] [address] waiver authority and the [employer] Act, this Virtual  Visit was conducted, with patient's (and/or legal guardian's) consent, to reduce the patient's risk of exposure to COVID-19 and provide necessary medical care. Services were provided through a video synchronous discussion virtually to substitute for in-person clinic visit. Patient and provider were located at their individual homes. An After Visit Summary was printed and given to the patient. All diagnosis have been discussed with the patient and all of the patient's questions have been answered. Follow-up and Dispositions    · Return in about 3 months (around 12/3/2021) for DM, HLD, HTN, 30 min, office only, AND, labs prior. Follow-up and Disposition History          Armando Sheriff, [facility]  [address] [employer]  [address] [place] [address].   David Larson

## 2021-09-03 NOTE — PROGRESS NOTES
Please use this number [phone]    Did you take you medication ? Yes     1. Have you been to the ER, urgent care clinic since your last visit? Hospitalized since your last visit? No    2. Have you seen or consulted any other health care providers outside of the [address] [place] [address] since your last visit? Include any pap smears or colon screening.  No     Health Maintenance Due   Topic Date Due    Hepatitis C Screening  Never done    Foot Exam Q1  Never done    MICROALBUMIN Q1  Never done    Eye Exam Retinal or Dilated  Never done    COVID-19 Vaccine (1) Never done    DTaP/Tdap/Td series (1 - Tdap) Never done    Colorectal Cancer Screening Combo  Never done    Shingrix Vaccine Age 50> (1 of 2) Never done    Lipid Screen  06/25/2019    A1C test (Diabetic or Prediabetic)  01/06/2021    Flu Vaccine (1) 09/01/2021

## 2021-09-07 ENCOUNTER — TELEPHONE (OUTPATIENT)
Dept: FAMILY MEDICINE CLINIC | Age: 51
End: 2021-09-07

## 2021-09-07 NOTE — TELEPHONE ENCOUNTER
Called patient to schedule appointment for PharmD diabetes education and management per referral from PCP Roxana Lares NP. Confirmed patient's . Scheduled patient for PharmD appointment on  at [address] patient to have all medications and BG readings ready for appointment. Patient expressed understanding and had no further questions. Thank you,  Milan Horner, [address]:  Yes   Recommendation Provided To: Patient/Caregiver: 1 via Telephone   Intervention Detail: Scheduled Appointment   Gap Closed?: No   Intervention Accepted By: Patient/Caregiver: 1   Time Spent (min): 5

## 2021-09-10 ENCOUNTER — OFFICE VISIT (OUTPATIENT)
Dept: FAMILY MEDICINE CLINIC | Age: 51
End: 2021-09-10

## 2021-09-10 DIAGNOSIS — E11.9 CONTROLLED TYPE 2 DIABETES MELLITUS WITHOUT COMPLICATION, WITHOUT LONG-TERM CURRENT USE OF INSULIN (HCC): Primary | ICD-10-CM

## 2021-09-10 RX ORDER — FLUTICASONE FUROATE 100 UG/1
1 POWDER RESPIRATORY (INHALATION) DAILY
COMMUNITY
End: 2022-04-26 | Stop reason: SDUPTHER

## 2021-09-10 RX ORDER — INSULIN LISPRO 100 [IU]/ML
INJECTION, SOLUTION INTRAVENOUS; SUBCUTANEOUS
Qty: 5 PEN | Refills: 1 | Status: SHIPPED | OUTPATIENT
Start: 2021-09-10 | End: 2022-04-19

## 2021-09-10 NOTE — PATIENT INSTRUCTIONS
Your Visit Summary:   - Stop humalog 75/25 mix. Start humalog 5 units with meals. Do not give humalog if you do not eat a meal.  - Increase Lantus to 60 units at bedtime.  - Continue metformin and farxiga.  - Follow-up in 1 week via phone to review blood sugars. If BS < 70 occur, call me at [phone]. Check and document your blood sugar four times daily (in the morning and before giving humalog insulin). Bring your meter/log to all future visits. Your blood sugar goals:  - Fasting (first thing in the morning)  blood sugar: 80 - 130   - 1 to 2 hours after a meal: less than 180     If you experience symptoms of low blood sugar (example: less than 70):  - Confirm low reading by checking your blood sugar.   - Then treat with 15 grams of carbohydrates (one-half cup of juice or regular soda, or 4-5 glucose tablets). - Wait 15 minutes to recheck blood sugar.   - Then eat a protein containing meal/snack to prevent another low blood sugar episode. (example: peanut butter + crackers)    Other recommendations:  - Schedule an annual eye exam.  - Check your feet daily for any signs of open wounds, cuts, or sores. - Given your risk factors, the following vaccines are recommended: annual flu shot and pneumococcal vaccine (Pneumovax)      In addition to taking your medications as directed, improving your blood sugar involves modifying your nutrition and maximizing the amount of physical activity. Nutrition:  - When reviewing a nutrition label, focus on the serving size, total calories, fat (and type of fats), total carbohydrates, sugar (and amount of added sugar), amount of fiber (good for your digestive), and amount of protein. Refer to your nutrition label guide for more information.  - For a meal : max 45 - 60 grams of carbohydrates  - For a snack: max 15 grams of carbohydrates  - Reduce amount of saturated and trans fat. Consider more unsaturated fat options as they are better for your heart health.    - Have at least 1 serving of lean protein with each meal.    - Increasing fiber helps you feel full after eating and have regular bowel movements. It can also help reduce your cholesterol.   - Substitute fruit juices for the whole fruit. Low carb snack ideas (15 grams total carb or less):   String cheese or babybel with 6 crackers   4 peanut butter crackers   3 cups of popcorn   1 cup raw vegetables with hummus or ranch dip (just need to watch how much dip you use)   Nuts   2 rice cakes   Celery with peanut butter or cream cheese   String cheese with 1 serving of fruit   Greek yogurt (look at label to make sure < 15 gram carb)   Plain greek yogurt with fresh berries added   Nature valley protein bar   Whisps parmesan cheese crisps   Hard boiled egg   Cottage cheese   Tuna salad lettuce roll-ups   Deli meat roll-ups with slice of cheese   Sugar Free Jello   Glucerna shake (16 grams)    Glucerna hunger smart shake (16 grams)   Ensure protein max shake   Fruit (1 serving/15 grams)   1/2 banana, marguerite, or grapefruit    1/3 melon (small cantaloupe)   1 slice or 1 cup of honeydew melon   1 slice or 1 and 1/4 cups of watermelon    1 small apple, peach, orange or pear   2 small tangerines   1 cup of raspberries   3/4 cup of blackberries, blueberries or pineapples   1/2 cup of fruit juice, pears, applesauce, or mangos   17 small grapes   12 cherries    Be careful with the glucerna products as they differ in the total carbs depending on the product (some are intended as meal replacements not snacks). Make sure you look at the total carbs on the label as products can differ. Physical Activity:  - Aim for 30 minutes of consistent, moderately intensive, physical activity a day for 5 days or an average of 150 minutes per week. - Start slow, increase as tolerated.  For example: Walk every day, working up to 30 minutes of brisk walking, 5 days a weekor split the 30 minutes into two 15-minute or three 10-minute walks. - If you sit for a long time, get up and move/stretch every 90 minutes.

## 2021-09-10 NOTE — PROGRESS NOTES
Pharmacy Progress Note - Diabetes Management    S/O: Mr. Minerva Mcfarlane is a 44 y.o. male, referred by Felipa Flores NP, was seen today for diabetes management initial visit. Patient's last A1c was 14% in August 2021. This is an increase from 11.4% in October 2020. Brief History: Patient was recently hospitalized with COVID-19. During his hospitalization, he was told his A1c had increased to 14%. Patient states he had stopped taking [place] and was just taking Metformin during this time. Upon discharge, patient continued on Farxiga and metformin and was started on an intensive insulin regimen. Current anti-hyperglycemic regimen includes:    - Farxiga 10 mg daily  - Lantus 50 units once daily  - Humalog 75/25 mix 20 units with meals (usually using twice daily because typically does not eat 3 meals per day)  - Metformin 500 mg BID    Patient reports adherence with his medications. Reports that he occasionally can't remember if he took his medication. Patient takes many medications and does not use a pill box. States that last night he could not remember if he took his medicines, so he took them again. However, he later realized that he had already taken them, so he accidentally took a double dose of his night medications. ROS:  Today, Pt endorses:  - Symptoms of Hyperglycemia: excessive thirst, polyuria and blurry vision  - Symptoms of Hypoglycemia: none    Self Monitoring Blood Glucose (SMBG) or CGM:  - Recently starting testing his blood sugar twice daily (in the morning and before dinner). Fasting:   -9/8: 244  -9/9: 168  -9/10: 254   Before dinner:   -9/8: 140  -9/9: 170    Nutrition/Lifestyle Modifications:  Diet:  - Breakfast: usually skips; sometimes eats cereal if didn't eat dinner the night before  - Lunch: typically the first meal of day.  [place] ([facility]) sandwich on wheat bread; salads w/ ranch dressing and fruit; or pre-packaged meal that he receives for free through [employer]  - Dinner: [employer] fish with rice and cucumbers; Hamburger with wheat bread. If doesn't feel like eating dinner, eats 4 spoons of peanutbutter  - Snack(s): fruit, oatmeal cakes, oreos, little debbies (trying to get rid of these unhealthy snacks)  - Beverage(s): water, loves juice but is trying to avoid drinking it. Usually cuts juices half and half with water. Physical Activity:   - Activity limited due to CHF, patient reports getting SOB with walking. Other pertinent history:  The patient has a history of the following which may impact medication therapy selection/safety:  Heart failure     Labs/Vitals: Wt Readings from Last 3 Encounters:   07/12/21 309 lb (140.2 kg)   06/15/21 316 lb 12.8 oz (143.7 kg)   02/19/21 330 lb (149.7 kg)     BP Readings from Last 3 Encounters:   07/12/21 104/63   06/15/21 (!) 149/91   02/19/21 127/69       Lab Results   Component Value Date/Time    Sodium 137 08/23/2021 05:29 AM    Potassium 4.5 08/23/2021 05:29 AM    Chloride 110 08/23/2021 05:29 AM    CO2 21 08/23/2021 05:29 AM    Anion gap 6 08/23/2021 05:29 AM    Glucose 286 (H) 08/23/2021 05:29 AM    BUN 17  08/23/2021 05:29 AM    Creatinine 0.8 08/23/2021 05:29 AM    GFR est AA >60 08/23/2021 05:29 AM    GFR est non-AA >60 08/23/2021 05:29 AM    Calcium 7.8 (L) 08/23/2021 05:29 AM    Bilirubin, total 0.6 02/12/2021 12:14 PM    Alk. phosphatase 132 (H) 02/12/2021 12:14 PM    Protein, total 7.4 02/12/2021 12:14 PM    Albumin 3.8 02/12/2021 12:14 PM    Globulin 3.6 02/12/2021 12:14 PM    A-G Ratio 1.1 02/12/2021 12:14 PM    ALT (SGPT) 30 02/12/2021 12:14 PM     HbA1c:  Lab Results   Component Value Date/Time    Hemoglobin A1c, External 14.0 8/20/2021    Hemoglobin A1c, External 11.4 10/06/2020 12:00 AM     A/P:    Diabetes Management:  - Per ADA guidelines, Pt's A1c is not at goal of < 7%. - Patients blood sugars currently are variable, ranging from 140 to mid 200s.  Using a pre-mixed insulin with a basal insulin complicates dose adjustments due to having two longer acting insulins on board. To facilitate simpler insulin adjustments, will switch patient from Humalog 75/25 mix to just Humalog.   - Stop Humalog 75/25 Mix. Start Humalog 5 units TID with meals. Patient to only administer Humalog with a meal, do not give if skipping meal.   - Increase Lantus to 60 units at bedtime.   - Check blood sugars 4 times daily (in the morning and before giving insulin with meals). - Will follow-up in 1 week to review blood sugars and adjust insulin doses. - Reviewed and provided resource discussing s/sx of hypoglycemia and management.  - Recommended patient get a pill box to make self-management of medications simpler. Nutrition/Lifestyle Modifications:  - Avoid skipping meals, as this can be dangerous especially when using insulin. - Limit intake of juices and other sugary beverages. - While fruit is a good snack, limit portion sizes to avoid spikes in blood sugar.  - Recommend moderating and diversifying carbohydrate intake to max of~45-60 grams/meal and 15 grams/snack ; at least 1 serving of protein/meal.  Reviewed low-carb alternatives to existing food choices. - Provided resources on nutrition label, portion sizes, and food groups to assist pt w/ decision making/meal planning. Medication reconciliation was completed during the visit. There are no discontinued medications. Patient verbalized understanding of the information presented and all of the patients questions were answered. AVS was handed to the patient. Patient advised to call the office with any additional questions or concerns. Notifications of recommendations will be sent to Lily May NP for review. Follow-up via telephone in 1 week. Thank you,  Bishnu Cheatham 69 in place:  Yes   Recommendation Provided To: Patient/Caregiver: 3 via In person   Intervention Detail: Dose Adjustment: 1, reason: Therapy Optimization, New Rx: 1, reason: Needs Additional Therapy and Scheduled Appointment   Gap Closed?: No   Intervention Accepted By: Patient/Caregiver: 3   Time Spent (min): 85

## 2021-09-17 ENCOUNTER — VIRTUAL VISIT (OUTPATIENT)
Dept: FAMILY MEDICINE CLINIC | Age: 51
End: 2021-09-17

## 2021-09-17 DIAGNOSIS — E11.9 CONTROLLED TYPE 2 DIABETES MELLITUS WITHOUT COMPLICATION, WITHOUT LONG-TERM CURRENT USE OF INSULIN (HCC): ICD-10-CM

## 2021-09-17 RX ORDER — BLOOD SUGAR DIAGNOSTIC
STRIP MISCELLANEOUS
Qty: 200 STRIP | Refills: 5 | Status: SHIPPED | OUTPATIENT
Start: 2021-09-17 | End: 2022-10-31

## 2021-09-17 RX ORDER — LANCETS 33 GAUGE
EACH MISCELLANEOUS
Qty: 200 EACH | Refills: 5 | Status: SHIPPED | OUTPATIENT
Start: 2021-09-17 | End: 2022-10-31

## 2021-09-17 NOTE — PROGRESS NOTES
Pharmacy Progress Note - Diabetes Management    S/O: Mr. Kemal Dejesus is a 44 y.o. male, referred by Vianca Shay NP, was contacted today for diabetes management follow up. Patient's last A1c was 14% in August 2021. Interim update: Called patient to check on blood sugar readings since medications changes last week (switched from premixed Humalog 75/25 to Humalog 5 units with meals and increased Lantus dose to 60 units at bedtime). Patient states he has been testing 4 times daily and some of his most recent readings are as follows:  9/17: fasting - 217  9/16: 211, 185, 172, 244  9/15: 183, 242, 159, 139    Since patient has been testing more frequently, he requested prescriptions be sent in for test strips (with updated directions) and lancets. Current anti-hyperglycemic regimen include(s):    - Farxiga 10 mg daily  - Lantus 60 units once daily  - Humalog 5 units TID with meals  - Metformin 500 mg BID    A/P:    Diabetes Management:  - Blood sugars are still variable. Patient states these recent BG readings are significant improvements from the 300-400s he was experiencing before.   -Patient does not currently keep a written log of his blood sugars. We discussed the benefit of keeping a written log and adding notes related to diet, medications, and activity. This will better assist us in making medication and diet adjustments. Patient states he thinks that this would be helpful and will start doing this today.  -Patient still eating at variable times of day and sometimes skipping meals. Will review more dietary interventions with patient at next visit, as did not have time to discuss today. Will use the log patient keeps to make targeted dietary interventions. - Patient scheduled for an in office visit next week. Patient to bring written BG log with him to appointment. Will adjust insulin doses next week based on this information.  - Refills for test strips and lancets sent to pharmacy.     Will follow up with patient in 1 week. Orders Placed This Encounter    OneTouch Verio test strips strip     Sig: Use to test blood sugar 4 times daily     Dispense:  200 Strip     Refill:  5    lancets (OneTouch Delica Plus Lancet) 33 gauge misc     Sig: Use to test blood sugar 4 times daily     Dispense:  200 Each     Refill:  5       Thank you for the consult,  Huan Muñoz 36 in place:  Yes   Recommendation Provided To: Patient/Caregiver: 3 via Telephone   Intervention Detail: Refill(s) Provided and Scheduled Appointment   Gap Closed?: No   Intervention Accepted By: Patient/Caregiver: 3   Time Spent (min): 60

## 2021-09-29 ENCOUNTER — OFFICE VISIT (OUTPATIENT)
Dept: FAMILY MEDICINE CLINIC | Age: 51
End: 2021-09-29

## 2021-09-29 DIAGNOSIS — E11.9 CONTROLLED TYPE 2 DIABETES MELLITUS WITHOUT COMPLICATION, WITHOUT LONG-TERM CURRENT USE OF INSULIN (HCC): Primary | ICD-10-CM

## 2021-09-29 NOTE — PROGRESS NOTES
Pharmacy Progress Note - Diabetes Management    S/O: Mr. Chandler Graham is a 44 y.o. male with a PMH of T2DM, CHF, HTN, HLD, COPD, and atrial fibrillation, referred by Roxana Lares NP, was seen today for diabetes management follow up. Patient's last A1c was 14% in August 2021    Interim update: Patient brought in home blood glucose log to appointment today. Patient reports testing in the morning and before meals. Patient tries to test four times daily, but often has trouble getting blood from his fingers. Patient reports blood glucose readings have improved significantly. He states he has stopped eating many of the unhealthy, high sugar/carb foods he was eating before. He reports eating on average 1.5 meals per day. Patient states that he is often fatigued and therefore sleeps/rests a lot during the day, so may go long periods without eating. He is using Humalog 5 units every time he eats. He has been using Lantus 50 units instead of 60 units as instructed at our last visit. Current anti-hyperglycemic regimen include(s):    - Humalog 5 units with meals  - Lantus 50 units at bedtime  - Metformin 500 mg BID  - Farxiga 10 mg QD    ROS:  Today, Pt endorses:  - Symptoms of Hyperglycemia: excessive thirst and polyuria (likely also related to furosemide and Farxiga use)  - Symptoms of Hypoglycemia: none    Nutrition/Lifestyle Modifications:  Diet:  - Patient reports eating smaller portions and healthier options than before. States he threw out his sugary cereal and avoids eating many fatty foods.  - Patient reports drinking \"a lot\" of water during the day. He also plans to start eating \"Laxmi Elie\" or other pre-portioned meals to help control portions. Discussed the need to check sodium on these meals to avoid excessive sodium intake d/t CHF and fluid retention. Physical Activity:   - Patient gets out of breath easily due to CHF.  However, he still tries to go out on walks to stay active. Labs/Vitals: Wt Readings from Last 3 Encounters:   07/12/21 309 lb (140.2 kg)   06/15/21 316 lb 12.8 oz (143.7 kg)   02/19/21 330 lb (149.7 kg)     BP Readings from Last 3 Encounters:   07/12/21 104/63   06/15/21 (!) 149/91   02/19/21 127/69        Lab Results   Component Value Date/Time     Sodium 137 08/23/2021 05:29 AM     Potassium 4.5 08/23/2021 05:29 AM     Chloride 110 08/23/2021 05:29 AM     CO2 21 08/23/2021 05:29 AM     Anion gap 6 08/23/2021 05:29 AM     Glucose 286 (H) 08/23/2021 05:29 AM     BUN 17  08/23/2021 05:29 AM     Creatinine 0.8 08/23/2021 05:29 AM     GFR est AA >60 08/23/2021 05:29 AM     GFR est non-AA >60 08/23/2021 05:29 AM     Calcium 7.8 (L) 08/23/2021 05:29 AM     Bilirubin, total 0.6 02/12/2021 12:14 PM     Alk. phosphatase 132 (H) 02/12/2021 12:14 PM     Protein, total 7.4 02/12/2021 12:14 PM     Albumin 3.8 02/12/2021 12:14 PM     Globulin 3.6 02/12/2021 12:14 PM     A-G Ratio 1.1 02/12/2021 12:14 PM     ALT (SGPT) 30 02/12/2021 12:14 PM      HbA1c:  Lab Results   Component Value Date/Time     Hemoglobin A1c, External 14.0 (H) 8/20/2021     Hemoglobin A1c, External 11.4 (H) 10/06/2020 12:00 AM     A/P:    Diabetes Management:  - Per ADA guidelines, Pt's A1c is not at goal of < 7%. - Current SMBG trend shows consistent improvement in blood glucose readings over the past 2 weeks, with all readings now staying consistently < 200mg/dL. Commended patient on great work thus far. Patient's fasting blood glucose is consistently above goal of 130. Increase Lantus to 55 units at bedtime.   - Continue Humalog 5 units with meals. Encouraged patient to try to eat smaller meals more consistently throughout the day versus eating one large meal per day. This may help keep blood sugars more consistent. Continue metformin and Farxiga.  - Recommend checking four times daily and keeping a log.  Bring glucometer/log to all future visits.  - Reviewed and provided resource discussing s/sx of hypoglycemia and management. - Patient reports edema in abdomen and arms, also reports drinking a significant amount of water throughout the day. Discussed importance of limiting sodium intake and avoiding excessive water intake due to CHF and risk of worsening edema. Pt also encouraged to consistently use furosemide. Patient verbalized understanding of the information presented and all of the patients questions were answered. AVS was handed to the patient. Patient advised to call the office with any additional questions or concerns. Notifications of recommendations will be sent to Anson Mireles NP for review. Will follow-up with patient via telephone in 2 weeks. Thank you,  Bishnu Santiago [address] in place:  Yes   Recommendation Provided To: Patient/Caregiver: 2 via In person   Intervention Detail: Dose Adjustment: 1, reason: Therapy Optimization and Scheduled Appointment   Gap Closed?: No   Intervention Accepted By: Patient/Caregiver: 2   Time Spent (min): 60

## 2021-09-29 NOTE — PATIENT INSTRUCTIONS
Your Visit Summary:   - Increase Lantus to 55 units every night at bedtime.  - Continue all other medications at current doses. - Try eating more small meals throughout the day instead of 1 large meal per day. - Limit salt intake to less than 2000 mg daily. Try to avoid drinking too much water as well to avoid fluid build up. Check and document your blood sugar four times daily  Bring your meter/log to all future visits. Your blood sugar goals:  - Fasting (first thing in the morning)  blood sugar: 80 - 130   - 1 to 2 hours after a meal: less than 180     If you experience symptoms of low blood sugar (example: less than 70):  - Confirm low reading by checking your blood sugar.   - Then treat with 15 grams of carbohydrates (one-half cup of juice or regular soda, or 4-5 glucose tablets). - Wait 15 minutes to recheck blood sugar.   - Then eat a protein containing meal/snack to prevent another low blood sugar episode. (example: peanut butter + crackers)    Other recommendations:  - Schedule an annual eye exam.  - Check your feet daily for any signs of open wounds, cuts, or sores. - Given your risk factors, the following vaccines are recommended: annual flu shot and pneumococcal vaccine (Pneumovax)      In addition to taking your medications as directed, improving your blood sugar involves modifying your nutrition and maximizing the amount of physical activity. Nutrition:  - When reviewing a nutrition label, focus on the serving size, total calories, fat (and type of fats), total carbohydrates, sugar (and amount of added sugar), amount of fiber (good for your digestive), and amount of protein. Refer to your nutrition label guide for more information.  - For a meal : max 45 - 60 grams of carbohydrates  - For a snack: max 15 grams of carbohydrates  - Reduce amount of saturated and trans fat. Consider more unsaturated fat options as they are better for your heart health.    - Have at least 1 serving of lean protein with each meal.    - Increasing fiber helps you feel full after eating and have regular bowel movements. It can also help reduce your cholesterol.   - Substitute fruit juices for the whole fruit. Low carb snack ideas (15 grams total carb or less):   String cheese or babybel with 6 crackers   4 peanut butter crackers   3 cups of popcorn   1 cup raw vegetables with hummus or ranch dip (just need to watch how much dip you use)   Nuts   2 rice cakes   Celery with peanut butter or cream cheese   String cheese with 1 serving of fruit   Greek yogurt (look at label to make sure < 15 gram carb)   Plain greek yogurt with fresh berries added   Nature valley protein bar   Whisps parmesan cheese crisps   Hard boiled egg   Cottage cheese   Tuna salad lettuce roll-ups   Deli meat roll-ups with slice of cheese   Sugar Free Jello   Glucerna shake (16 grams)    Glucerna hunger smart shake (16 grams)   Ensure protein max shake   Fruit (1 serving/15 grams)   1/2 banana, marguerite, or grapefruit    1/3 melon (small cantaloupe)   1 slice or 1 cup of honeydew melon   1 slice or 1 and 1/4 cups of watermelon    1 small apple, peach, orange or pear   2 small tangerines   1 cup of raspberries   3/4 cup of blackberries, blueberries or pineapples   1/2 cup of fruit juice, pears, applesauce, or mangos   17 small grapes   12 cherries    Be careful with the glucerna products as they differ in the total carbs depending on the product (some are intended as meal replacements not snacks). Make sure you look at the total carbs on the label as products can differ. Physical Activity:  - Aim for 30 minutes of consistent, moderately intensive, physical activity a day for 5 days or an average of 150 minutes per week. - Start slow, increase as tolerated. For example: Walk every day, working up to 30 minutes of brisk walking, 5 days a weekor split the 30 minutes into two 15-minute or three 10-minute walks. - If you sit for a long time, get up and move/stretch every 90 minutes.

## 2021-10-01 DIAGNOSIS — I48.91 ATRIAL FIBRILLATION, UNSPECIFIED TYPE (HCC): ICD-10-CM

## 2021-10-01 NOTE — TELEPHONE ENCOUNTER
PCP: Juarez Chaney NP    Last appt: 6/15/2021  Future Appointments   Date Time Provider Kelvin Arredondo   10/13/2021 11:30 AM Viola Biggs, PHARMD AMA BS AMB   11/29/2021  1:00 PM AMA LAB AMA BS AMB   12/6/2021  2:00 PM Juarez Chaney NP AMA BS AMB   12/16/2021  1:30 PM Odell Jones MD [facility] BS AMB       Requested Prescriptions      No prescriptions requested or ordered in this encounter       Request for a 30 or 90 day supply?  Provider Discretion    Pharmacy:     Other Comments:

## 2021-10-06 DIAGNOSIS — E11.9 CONTROLLED TYPE 2 DIABETES MELLITUS WITHOUT COMPLICATION, WITHOUT LONG-TERM CURRENT USE OF INSULIN (HCC): ICD-10-CM

## 2021-10-07 RX ORDER — METFORMIN HYDROCHLORIDE 500 MG/1
TABLET ORAL
Qty: 180 TABLET | Refills: 0 | Status: SHIPPED | OUTPATIENT
Start: 2021-10-07 | End: 2022-02-01 | Stop reason: SDUPTHER

## 2021-10-11 ENCOUNTER — DOCUMENTATION ONLY (OUTPATIENT)
Dept: FAMILY MEDICINE CLINIC | Age: 51
End: 2021-10-11

## 2021-10-13 ENCOUNTER — VIRTUAL VISIT (OUTPATIENT)
Dept: FAMILY MEDICINE CLINIC | Age: 51
End: 2021-10-13

## 2021-10-13 DIAGNOSIS — E11.9 CONTROLLED TYPE 2 DIABETES MELLITUS WITHOUT COMPLICATION, WITHOUT LONG-TERM CURRENT USE OF INSULIN (HCC): Primary | ICD-10-CM

## 2021-10-13 RX ORDER — DULAGLUTIDE 0.75 MG/.5ML
INJECTION, SOLUTION SUBCUTANEOUS
Qty: 2 ML | Refills: 1 | Status: SHIPPED | OUTPATIENT
Start: 2021-10-13 | End: 2021-11-29 | Stop reason: DRUGHIGH

## 2021-10-13 NOTE — PROGRESS NOTES
Pharmacy Progress Note - Diabetes Management    S/O: Mr. Jesus Alberto Carranza 44 y.o. male, referred by Dr. Kervin Bridges NP, was contacted today for diabetes management follow up. Patient's last A1c was 14% in August.     Interim update: Patient reports blood sugars have been higher this past week. States he ran out of metformin last week and had a difficult time contacting the office to get refills and finding transportation to the pharmacy to  his prescriptions. However, he found out that [facility] delivers and he should be getting the metformin delivered today or tomorrow. States he also has not had an appetite the past few days and thus has not eaten much. Current anti-hyperglycemic regimen include(s):    - Humalog 5 units with meals (only uses when he eats)  - Lantus 55 units once daily  - Metformin 500 mg twice daily  - Farxiga 10 mg      ROS:  Today, Pt endorses:  - Symptoms of Hyperglycemia: fatigue (reports fatigue daily)  - Symptoms of Hypoglycemia: dizziness and sweating (reports that he felt these symptoms the other day and his blood sugar was 111; denies any readings < 70)    Self Monitoring Blood Glucose (SMBG) or CGM:  - Patient test BG 2-3 times daily  - Readings from the past several days are listed below:    Date AM readings PM reading   10/10 191, 209 143   10/11 188, 196 172   10/12 -- 254   10/13 221, 117      - Patient reports BG was 221 this morning, he then gave 5 units of insulin and BG came down to 117. States he then ate a sandwich and orange. States he didn't want to eat while his blood sugar was high because he didn't want to make it worse. Nutrition/Lifestyle Modifications:  - Patient still skips meals often and eats a very variable meal schedule  - Still reports cutting back on sweets and having much fewer cravings for unhealthy foods/sweets.     A/P:    Diabetes Management:  - SMBGs show worsened glycemic control this past week, likely secondary to patient being without metformin for a week and skipping meals. Explained to patient that blood sugars can go up despite not eating due to the release of stored glucose from the liver. Reminded patient of the importance of eating on a more consistent schedule to help regulate blood sugars. - Ultimately I would like to replace prandial insulin with GLP1 RA such as Trulicity due to the cardiovascular and weight loss benefits. Plan to start Trulicity and reduce Humalog does gradually and discontinue if possible. Discussed this with the patient, patient agreeable to this plan. Order for Trulicity 0.51 mg once weekly sent to patients pharmacy. Patient to continue all other diabetes medications at current doses. - Educated patient on Trulicity administration instructions and side effects. Takes several weeks to reach steady-state so may not notice much change in blood sugar readings to start. Will follow-up via phone in 2 weeks to check on BG readings and reduce Humalog dose if BG starting to improve. Patient given my contact information and instructed to call if blood sugar < 70 occurs or if any questions or concerns arise. Patient also instructed to call me at my direct number if he needs refills for diabetes medications in the future. Medication reconciliation was completed during the visit. There are no discontinued medications. Orders Placed This Encounter    dulaglutide (Trulicity) 6.06 UC/7.0 mL sub-q pen     Sig: Inject contents of one pen (0.75 mg) under the skin every seven (7) days     Dispense:  2 mL     Refill:  1         Notifications of recommendations will be sent to Dr. Tanner Dwyer, NP for review. Will follow up with patient in 2 week. Thank you for the consult,  Huan Collins 36 in place:  Yes   Recommendation Provided To: Patient/Caregiver: 3 via Telephone   Intervention Detail: Adherence Monitorin, New Rx: 1, reason: Needs Additional Therapy and Scheduled Appointment   Gap Closed?: No   Intervention Accepted By: Patient/Caregiver: 3   Time Spent (min): 75

## 2021-11-12 ENCOUNTER — TELEPHONE (OUTPATIENT)
Dept: FAMILY MEDICINE CLINIC | Age: 51
End: 2021-11-12

## 2021-11-12 ENCOUNTER — VIRTUAL VISIT (OUTPATIENT)
Dept: FAMILY MEDICINE CLINIC | Age: 51
End: 2021-11-12

## 2021-11-12 NOTE — TELEPHONE ENCOUNTER
Pharmacy Progress Note - Telephone Call    Mr. Candis Trivedi 44 y.o. was contacted via an outbound telephone call today for diabetes management follow-up. A voicemail was left for patient to return my call. Attempted to reach patient last week as well, patient has not returned my call. Patient started on Trulicity ~ 4 weeks ago. Planned to assess tolerability and increase Trulicity to 1.5 mg dose and discontinue meal time insulin. Patient has follow-up scheduled with PCP on 11/29/21. Patient is due for A1c at this appointment. Thank you,  Huan Gross 36 in place:  Yes   Time Spent (min): 10

## 2021-11-15 NOTE — TELEPHONE ENCOUNTER
Pharmacy Progress Note - Diabetes Management    S/O: Mr. Emiliano Aguila 44 y.o. male, referred by Govind Gonsales NP, was contacted today for diabetes management follow up. Patient's last A1c was 14% in August 2020. Interim update:   -Prescribed patient Trulicity about 4 weeks ago. I tried to get in touch with patient for the past 2 weeks to follow-up but could not reach him. Patient states he has not started Trulicity yet because he had some concerns about it based on things he read and heard. States he wants to learn more about it before he feels comfortable starting it. - Patient continues to complain of severe fatigue and recently has been having occasional chest pain. States he has angina and takes medication for chest pain, but it has been more frequent lately. Current anti-hyperglycemic regimen include(s):    - Humalog 5 units with meals (only uses when he eats, usually twice daily)  - Lantus 55 units once daily  - Metformin 500 mg twice daily  - Farxiga 10 mg    ROS:  Today, Pt endorses:  - Symptoms of Hyperglycemia: excessive thirst  - Symptoms of Hypoglycemia: none    Self Monitoring Blood Glucose (SMBG) or CGM:  - Readings the past 3 days were reported as follows:  Date AM reading PM reading   11/12 216 --   11/11 176 177   11/10 217 150   - Reports readings have been between 120-220s recently, but did have a reading of 299 a few weeks ago. A/P:    Diabetes Management:  - Per ADA guidelines, Pt's A1c is not at goal of < 7%. Patient is due for A1c at the end of this month. Has visit scheduled with the lab on 11/29/21.  - We discussed Trulicity benefits and risks in depth during our last visit. Educated patient again on the cardiovascular, renal, and weight loss benefits of Trulicity. Also discussed side effects and ways to minimize them. Reviewed warnings/risks with patient and confirmed that he does not any contraindications. Patient states he will start Trulicity today.    - Plan is to replace Humalog with Trulicity if able. Encouraged patient to continue limiting carbohydrate intake to facilitate this. Patient instructed to continue Humalog for now while Trulicity reaches steady state. Will follow-up in 2 weeks to see if Humalog can be reduced or stopped. Patient also given instructions for lowering Humalog dose for meals with limited carbs to prevent hypoglycemia.  - Continue checking blood sugars at least twice daily (recommend 3 times daily). - Regarding chest pain, instructed patient to call his cardiologist to discuss since the issues has increased in frequency from before. If severe chest pain, shortness of breath, or pain in jaw/neck/arms/back occurs, patient instructed to call 911. Notifications of recommendations will be sent to Mikey Veliz NP for review. Will follow up with patient in 2 week(s). Thank you for the consult,  Andrew Gaspar, [address] [place] [address] in place:  Yes   Recommendation Provided To: Patient/Caregiver: 1 via Telephone   Intervention Detail: Scheduled Appointment   Gap Closed?: No   Intervention Accepted By: Patient/Caregiver: 1   Time Spent (min): 45

## 2021-11-29 ENCOUNTER — VIRTUAL VISIT (OUTPATIENT)
Dept: FAMILY MEDICINE CLINIC | Age: 51
End: 2021-11-29

## 2021-11-29 DIAGNOSIS — E11.9 CONTROLLED TYPE 2 DIABETES MELLITUS WITHOUT COMPLICATION, WITHOUT LONG-TERM CURRENT USE OF INSULIN (HCC): Primary | ICD-10-CM

## 2021-11-29 RX ORDER — DULAGLUTIDE 1.5 MG/.5ML
INJECTION, SOLUTION SUBCUTANEOUS
Qty: 2 ML | Refills: 1 | Status: SHIPPED | OUTPATIENT
Start: 2021-11-29 | End: 2022-01-03 | Stop reason: ALTCHOICE

## 2021-11-29 NOTE — PROGRESS NOTES
Pharmacy Progress Note - Diabetes Management    S/O: Mr. Gary Cage 44 y.o. male, referred by Lula Chávez NP, was contacted today for diabetes management follow up. Interim update: Patient reports giving his 3rd Trulicity 1.11 mg injection last Friday. States he is tolerating well with no noticeable side effects. Reports his blood sugars have still been pretty variable and are ranging from 127 to 201. States they vary based on what he eats. Reports that he is still avoiding sweets and limiting carbs. Patient continues to test 2-3 times daily. Denies any recent hypoglycemic events. Patient came in for 3 month f/u labs today - A1c today 8.6%, down from 14% in Aug 2021    Current anti-hyperglycemic regimen include(s):    - Farxiga 10 mg once daily  - Humalog 5 units with meals (using 2-3 times daily)  - Lantus 55 units once daily   - Trulicity 5.53 mg once weekly  - Metformin 500 mg twice daily    A/P:    Diabetes Management:  - Patient tolerating Trulicity well. Will increase Trulicity to 1.5 mg once weekly after patient completes 4th dose of 0.75 mg. Plan is to replace Humalog with Trulicity if able. Patient given instructions to lower Humalog dose once Trulicity increased and BG readings come down. - Continue all other diabetes mediations at current doses. - Continue checking blood glucose in the morning and before each Humalog injection.   - Bring glucometer/log/ to all future visits. Medication reconciliation was completed during the visit. Medications Discontinued During This Encounter   Medication Reason    dulaglutide (Trulicity) 5.24 ZE/4.7 mL sub-q pen DOSE ADJUSTMENT     Orders Placed This Encounter    dulaglutide (Trulicity) 1.5 TN/5.6 mL sub-q pen     Sig: Inject contents of 1 pen (1.5 mg) subcutaneously once every 7 days. Dispense:  2 mL     Refill:  1     Notifications of recommendations will be sent to Lula Chávez NP for review.     Will follow up with patient in 3 week(s). Thank you for the consult,  Marquis Wood, [address] [place] [address] in place:  Yes   Recommendation Provided To: Patient/Caregiver: 3 via Telephone   Intervention Detail: Discontinued Rx: 1, reason: Therapy Complete, Dose Adjustment: 1, reason: Therapy De-escalation and New Rx: 1, reason: Needs Additional Therapy   Gap Closed?: Yes   Intervention Accepted By: Patient/Caregiver: 3   Time Spent (min): 50

## 2021-12-01 DIAGNOSIS — I10 ESSENTIAL HYPERTENSION: ICD-10-CM

## 2021-12-01 RX ORDER — ISOSORBIDE MONONITRATE 30 MG/1
30 TABLET, EXTENDED RELEASE ORAL
Qty: 90 TABLET | Refills: 1 | Status: SHIPPED | OUTPATIENT
Start: 2021-12-01 | End: 2022-06-23

## 2021-12-01 RX ORDER — LOSARTAN POTASSIUM 100 MG/1
100 TABLET ORAL DAILY
Qty: 90 TABLET | Refills: 1 | Status: SHIPPED | OUTPATIENT
Start: 2021-12-01 | End: 2022-06-23

## 2021-12-01 RX ORDER — AMLODIPINE BESYLATE 10 MG/1
10 TABLET ORAL DAILY
Qty: 90 TABLET | Refills: 1 | Status: SHIPPED | OUTPATIENT
Start: 2021-12-01 | End: 2022-06-23

## 2021-12-01 NOTE — TELEPHONE ENCOUNTER
Requested Prescriptions     Pending Prescriptions Disp Refills    amLODIPine (NORVASC) 10 mg tablet 90 Tablet 1     Sig: Take 1 Tablet by mouth daily.  isosorbide mononitrate ER (IMDUR) 30 mg tablet 90 Tablet 1     Sig: Take 1 Tablet by mouth every morning.  losartan (COZAAR) 100 mg tablet 90 Tablet 1     Sig: Take 1 Tablet by mouth daily.

## 2021-12-01 NOTE — TELEPHONE ENCOUNTER
PCP: Solo Mclean NP    Last appt: 6/15/2021  Future Appointments   Date Time Provider Kelvin Arredondo   12/6/2021  2:00 PM Solo Mclean NP AMA [facility]   12/16/2021  1:30 PM Yara Jordan MD [facility] BS [facility]       Requested Prescriptions     Pending Prescriptions Disp Refills    amLODIPine (NORVASC) 10 mg tablet 90 Tablet 1     Sig: Take 1 Tablet by mouth daily.  isosorbide mononitrate ER (IMDUR) 30 mg tablet 90 Tablet 1     Sig: Take 1 Tablet by mouth every morning.  losartan (COZAAR) 100 mg tablet 90 Tablet 1     Sig: Take 1 Tablet by mouth daily.

## 2021-12-06 ENCOUNTER — OFFICE VISIT (OUTPATIENT)
Dept: FAMILY MEDICINE CLINIC | Age: 51
End: 2021-12-06
Payer: MEDICAID

## 2021-12-06 VITALS
WEIGHT: 315 LBS | TEMPERATURE: 98.2 F | BODY MASS INDEX: 41.75 KG/M2 | DIASTOLIC BLOOD PRESSURE: 86 MMHG | HEIGHT: 73 IN | HEART RATE: 82 BPM | SYSTOLIC BLOOD PRESSURE: 136 MMHG | OXYGEN SATURATION: 94 % | RESPIRATION RATE: 20 BRPM

## 2021-12-06 DIAGNOSIS — I48.0 PAROXYSMAL ATRIAL FIBRILLATION (HCC): ICD-10-CM

## 2021-12-06 DIAGNOSIS — E78.5 DYSLIPIDEMIA: ICD-10-CM

## 2021-12-06 DIAGNOSIS — E11.9 CONTROLLED TYPE 2 DIABETES MELLITUS WITHOUT COMPLICATION, WITHOUT LONG-TERM CURRENT USE OF INSULIN (HCC): Primary | ICD-10-CM

## 2021-12-06 DIAGNOSIS — I50.42 CHRONIC COMBINED SYSTOLIC AND DIASTOLIC CONGESTIVE HEART FAILURE (HCC): ICD-10-CM

## 2021-12-06 DIAGNOSIS — I50.9 CHRONIC CONGESTIVE HEART FAILURE, UNSPECIFIED HEART FAILURE TYPE (HCC): ICD-10-CM

## 2021-12-06 DIAGNOSIS — I27.20 PULMONARY HYPERTENSION (HCC): ICD-10-CM

## 2021-12-06 DIAGNOSIS — I42.9 CARDIOMYOPATHY, UNSPECIFIED TYPE (HCC): ICD-10-CM

## 2021-12-06 DIAGNOSIS — J44.9 CHRONIC OBSTRUCTIVE PULMONARY DISEASE, UNSPECIFIED COPD TYPE (HCC): ICD-10-CM

## 2021-12-06 DIAGNOSIS — I10 PRIMARY HYPERTENSION: ICD-10-CM

## 2021-12-06 DIAGNOSIS — Z12.11 COLON CANCER SCREENING: ICD-10-CM

## 2021-12-06 PROCEDURE — 3052F HG A1C>EQUAL 8.0%<EQUAL 9.0%: CPT | Performed by: NURSE PRACTITIONER

## 2021-12-06 PROCEDURE — 99214 OFFICE O/P EST MOD 30 MIN: CPT | Performed by: NURSE PRACTITIONER

## 2021-12-06 RX ORDER — SPIRONOLACTONE 50 MG/1
50 TABLET, FILM COATED ORAL DAILY
Qty: 90 TABLET | Refills: 0 | Status: SHIPPED | OUTPATIENT
Start: 2021-12-06 | End: 2022-03-30

## 2021-12-06 NOTE — PROGRESS NOTES
[address] [place] [address]               [phone]      Patrick Harmon is a 46 y.o. male and presents with Follow Up Chronic Condition, Diabetes, Hypertension, and Cholesterol Problem       Assessment/Plan:    Diagnoses and all orders for this visit:    1. Controlled type 2 diabetes mellitus without complication, without long-term current use of insulin (HCC)  -     HEMOGLOBIN A1C WITH EAG; Future  -     MICROALBUMIN, UR, RAND W/ MICROALB/CREAT RATIO; Future  Endorses medication compliance, Follow up labs prior to next visit, Denies signs and symptoms of hyper or hypoglycemia, Diabetes controlled, continue same treatment and A1C down from 14 to 8.9%, continue same treatment and continue to monitor   2. Primary hypertension  -     METABOLIC PANEL, COMPREHENSIVE; Future  Endorses medication compliance, Follow up labs prior to next visit and Blood pressure stable, continue same medications   3. Dyslipidemia  -     LIPID PANEL; Future  Endorses medication compliance, Follow up labs prior to next visit and Denies abdominal pain or symptoms of myalgia   4. Chronic congestive heart failure, unspecified heart failure type (HCC)  -     spironolactone (ALDACTONE) 50 mg tablet; Take 1 Tablet by mouth daily. Refill provided   5. Colon cancer screening  -     OCCULT BLOOD IMMUNOASSAY,DIAGNOSTIC; Future  Health maintenance needs addressed   6. Cardiomyopathy, unspecified type [facility])  Assessment & Plan:   monitored by specialist. No acute findings meriting change in the plan      7. Chronic combined systolic and diastolic congestive heart failure ([facility] [address]  Assessment & Plan:   monitored by specialist. No acute findings meriting change in the plan      8. Chronic obstructive pulmonary disease, unspecified COPD type ([facility] [address]  Assessment & Plan:   well controlled, continue current medications      9.  Paroxysmal atrial fibrillation (HCC)  Assessment & Plan:   monitored by specialist. No acute findings meriting change in the plan      10. Pulmonary hypertension ([facility] [address]  Assessment & Plan:   monitored by specialist. No acute findings meriting change in the plan        Follow-up and Dispositions    · Return in about 3 months (around 3/6/2022) for DM, HLD, HTN, DM foot, 30 min, office only, with, labs prior. Health Maintenance:   Health Maintenance   Topic Date Due    COVID-19 Vaccine (1) Never done    Foot Exam Q1  Never done    Eye Exam Retinal or Dilated  Never done    DTaP/Tdap/Td series (1 - Tdap) Never done    Colorectal Cancer Screening Combo  Never done    Shingrix Vaccine Age 50> (1 of 2) Never done    Flu Vaccine (1) 09/01/2021    A1C test (Diabetic or Prediabetic)  11/29/2022    MICROALBUMIN Q1  11/29/2022    Lipid Screen  11/29/2022    Hepatitis C Screening  Completed    Pneumococcal 0-64 years  Aged Out        Subjective:    DMII- on 8/20/21 in the hospital his A1c was 14.0%   Patient reports medication compliance Daily  Diabetic diet compliance most of the time  Patient monitors blood sugars regularly TID   Reports am fasting sugars range AM: 201  afternoon: 141  PM: 169-165   Denies hypoglycemic episodes yes  Denies polyuria, polydipsia, paraesthesia, vision changes? yes  Engaging in daily exercise?  Yes: Comment: walking around block     Diabetic Foot and Eye Exam HM Status   Topic Date Due    Diabetic Foot Care  Never done    Eye Exam  Never done     Hemoglobin A1c   Date Value Ref Range Status   11/29/2021 8.6 (H) 4.8 - 5.6 % Final     Comment:              Prediabetes: 5.7 - 6.4           Diabetes: >6.4           Glycemic control for adults with diabetes: <7.0       Hemoglobin A1c, External   Date Value Ref Range Status   08/20/2021 14.0 % Final     Comment:     In Patient [facility]   ]  Creatinine, urine   Date Value Ref Range Status   11/29/2021 120.4 Not Estab. mg/dL Final     Microalb/Creat ratio (ug/mg creat.)   Date Value Ref Range Status   11/29/2021 26 0 - 29 mg/g creat Final     Comment:                            Normal:                0 -  29                         Moderately increased: 30 - 300                         Severely increased:       >300       Key Antihyperglycemic Medications             dulaglutide (Trulicity) 1.5 HR/4.9 mL sub-q pen (Taking) Inject contents of 1 pen (1.5 mg) subcutaneously once every 7 days. metFORMIN (GLUCOPHAGE) 500 mg tablet (Taking) Take 1 tablet by mouth twice daily    insulin lispro (HUMALOG) 100 unit/mL kwikpen (Taking) Inject 5 units subcutaneously with meals three times daily. (STOP humalog 75/25 mix)  Indications: type 2 diabetes mellitus    dapagliflozin (Farxiga) 10 mg tab tablet (Taking) Take 1 Tablet by mouth daily. insulin glargine (LANTUS,BASAGLAR) 100 unit/mL (3 mL) inpn (Taking) 55 Units by SubCUTAneous route nightly. Hypertension:   Patient reports taking medications as instructed. yes   Medication side effects noted. no  Headache upon wakening. no   Home BP monitoring in range of does not check's systolic. Do you experience chest pain/pressure or SOB with exertion? no  Maintain a low Sodium diet? yes  Key CAD CHF Meds             spironolactone (ALDACTONE) 50 mg tablet (Taking) Take 1 Tablet by mouth daily. amLODIPine (NORVASC) 10 mg tablet (Taking) Take 1 Tablet by mouth daily. isosorbide mononitrate ER (IMDUR) 30 mg tablet (Taking) Take 1 Tablet by mouth every morning. losartan (COZAAR) 100 mg tablet (Taking) Take 1 Tablet by mouth daily. rivaroxaban (XARELTO) 20 mg tab tablet (Taking) Take 1 Tablet by mouth daily (with breakfast). hydrALAZINE (APRESOLINE) 50 mg tablet (Taking) Take 1 Tablet by mouth three (3) times daily. atorvastatin (LIPITOR) 40 mg tablet (Taking) Take 1 Tablet by mouth nightly. carvediloL (COREG) 25 mg tablet (Taking) Take 1 Tablet by mouth two (2) times daily (with meals). furosemide (LASIX) 40 mg tablet (Taking) Take 1 Tab by mouth daily.         BUN Date Value Ref Range Status   11/29/2021 19 6 - 24 mg/dL Final     Creatinine   Date Value Ref Range Status   11/29/2021 1.12 0.76 - 1.27 mg/dL Final     GFR est AA   Date Value Ref Range Status   11/29/2021 87 >59 mL/min/1.73 Final     Comment:     **In accordance with recommendations from the NKF-ASN Task force,**    [facility] is in the process of updating its eGFR calculation to the    2021 CKD-EPI creatinine equation that estimates kidney function    without a race variable. Potassium   Date Value Ref Range Status   11/29/2021 4.7 3.5 - 5.2 mmol/L Final       HLD:  Has been compliant with meds  Yes  Compliant with low-fat diet. most of the time    Denies myalgias or other side effects. yes  The ASCVD Risk score (Kayla Mas et al., 2013) failed to calculate for the following reasons: The valid total cholesterol range is 130 to 320 mg/dL    Cholesterol, total   Date Value Ref Range Status   11/29/2021 127 100 - 199 mg/dL Final     Triglyceride   Date Value Ref Range Status   11/29/2021 212 (H) 0 - 149 mg/dL Final     HDL Cholesterol   Date Value Ref Range Status   11/29/2021 29 (L) >39 mg/dL Final     LDL, calculated   Date Value Ref Range Status   11/29/2021 63 0 - 99 mg/dL Final   ]  Key Antihyperlipidemia Meds             atorvastatin (LIPITOR) 40 mg tablet (Taking) Take 1 Tablet by mouth nightly. ROS:     ROS  As stated in HPI, otherwise all others negative. The problem list was updated as a part of today's visit.   Patient Active Problem List   Diagnosis Code    Hypertension I10    Arrhythmia I49.9    Asthma J45.909    Chronic combined systolic and diastolic congestive heart failure (HCC) I50.42    Hypercholesterolemia E78.00    GERD (gastroesophageal reflux disease) K21.9    Chronic obstructive pulmonary disease (HCC) J44.9    Dyslipidemia E78.5    Paroxysmal atrial fibrillation (HCC) I48.0    Cardiomyopathy ([address] I42.9    Controlled type 2 diabetes mellitus without complication, without long-term current use of insulin ([facility]) E11.9    History of sleep apnea Z86.69    Hypertensive cardiomegaly I11.9    Mild persistent asthma without complication A25.62    Morbid obesity with body mass index (BMI) of 40.0 to 44.9 in adult ([facility]) E66.01, Z68.41    Noncompliance with diet and medication regimen Z91.11, Z91.14    Pulmonary hypertension ([facility]) I27.20    History of 2019 novel coronavirus disease (COVID-19) Z86.16       The PSH,  were reviewed. SH:  Social History     Tobacco Use    Smoking status: Former Smoker     Types: Cigars    Smokeless tobacco: Never Used    Tobacco comment: black N mild    Substance Use Topics    Alcohol use: Yes     Alcohol/week: 0.0 standard drinks     Comment: Socially    Drug use: No       Medications/Allergies:  Current Outpatient Medications on File Prior to Visit   Medication Sig Dispense Refill    amLODIPine (NORVASC) 10 mg tablet Take 1 Tablet by mouth daily. 90 Tablet 1    isosorbide mononitrate ER (IMDUR) 30 mg tablet Take 1 Tablet by mouth every morning. 90 Tablet 1    losartan (COZAAR) 100 mg tablet Take 1 Tablet by mouth daily. 90 Tablet 1    dulaglutide (Trulicity) 1.5 OZ/0.9 mL sub-q pen Inject contents of 1 pen (1.5 mg) subcutaneously once every 7 days. 2 mL 1    metFORMIN (GLUCOPHAGE) 500 mg tablet Take 1 tablet by mouth twice daily 180 Tablet 0    rivaroxaban (XARELTO) 20 mg tab tablet Take 1 Tablet by mouth daily (with breakfast). 90 Tablet 3    OneTouch Verio test strips strip Use to test blood sugar 4 times daily 200 Strip 5    lancets (OneTouch Delica Plus Lancet) 33 gauge misc Use to test blood sugar 4 times daily 200 Each 5    insulin lispro (HUMALOG) 100 unit/mL kwikpen Inject 5 units subcutaneously with meals three times daily.  (STOP humalog 75/25 mix)  Indications: type 2 diabetes mellitus 5 Pen 1    albuterol (PROVENTIL HFA, VENTOLIN HFA, PROAIR HFA) 90 mcg/actuation inhaler Take 1-2 puffs every 4-6 hrs prn shortness of breath 1 Each 3    dapagliflozin (Farxiga) 10 mg tab tablet Take 1 Tablet by mouth daily. 90 Tablet 1    hydrALAZINE (APRESOLINE) 50 mg tablet Take 1 Tablet by mouth three (3) times daily. 270 Tablet 0    insulin glargine (LANTUS,BASAGLAR) 100 unit/mL (3 mL) inpn 55 Units by SubCUTAneous route nightly.  atorvastatin (LIPITOR) 40 mg tablet Take 1 Tablet by mouth nightly. 90 Tablet 1    carvediloL (COREG) 25 mg tablet Take 1 Tablet by mouth two (2) times daily (with meals). 180 Tablet 3    furosemide (LASIX) 40 mg tablet Take 1 Tab by mouth daily. 90 Tab 3    fluticasone furoate (Arnuity Ellipta) 100 mcg/actuation dsdv inhaler Take 1 Puff by inhalation daily.  ipratropium (Atrovent HFA) 17 mcg/actuation inhaler Take 1 Puff by inhalation every six (6) hours as needed for Wheezing. No current facility-administered medications on file prior to visit. Allergies   Allergen Reactions    Shellfish Derived Hives       Objective:  Visit Vitals  /86   Pulse 82   Temp 98.2 °F (36.8 °C) (Temporal)   Resp 20   Ht 6' 1\" (1.854 m)   Wt 316 lb (143.3 kg)   SpO2 94%   BMI 41.69 kg/m²    Body mass index is 41.69 kg/m².     Physical assessment  Physical Exam      Labwork and Ancillary Studies:    CBC w/Diff  Lab Results   Component Value Date/Time    WBC 5.7 02/12/2021 12:14 PM    HGB 14.4 02/12/2021 12:14 PM    PLATELET 171 95/02/9258 12:14 PM         Basic Metabolic Profile  Lab Results   Component Value Date/Time    Sodium 145 (H) 11/29/2021 12:00 AM    Potassium 4.7 11/29/2021 12:00 AM    Chloride 105 11/29/2021 12:00 AM    CO2 23 11/29/2021 12:00 AM    Anion gap 6 02/12/2021 12:14 PM    Glucose 110 (H) 11/29/2021 12:00 AM    BUN 19 11/29/2021 12:00 AM    Creatinine 1.12 11/29/2021 12:00 AM    BUN/Creatinine ratio 17 11/29/2021 12:00 AM    GFR est AA 87 11/29/2021 12:00 AM    GFR est non-AA 75 11/29/2021 12:00 AM    Calcium 9.6 11/29/2021 12:00 AM        Cholesterol  Lab Results Component Value Date/Time    Cholesterol, total 127 11/29/2021 12:00 AM    HDL Cholesterol 29 (L) 11/29/2021 12:00 AM    LDL, calculated 63 11/29/2021 12:00 AM    Triglyceride 212 (H) 11/29/2021 12:00 AM           I have discussed the diagnosis with the patient and the intended plan as seen in the above orders. The patient has received an After-Visit Summary and questions were answered concerning future plans. An After Visit Summary was printed and given to the patient. All diagnosis have been discussed with the patient and all of the patient's questions have been answered. Follow-up and Dispositions    · Return in about 3 months (around 3/6/2022) for DM, HLD, HTN, DM foot, 30 min, office only, with, labs prior. Latasha Jay, [facility]  [facility] [place]   [address] [facility] [address]  [id number]

## 2021-12-06 NOTE — PATIENT INSTRUCTIONS
Learning About the [address] [facility] Diet  What is the Mediterranean diet? The Mediterranean diet is a style of eating rather than a diet plan. It features foods eaten in [place], [place], [place] and [place], and other countries along the [facility]. It emphasizes eating foods like fish, fruits, vegetables, beans, high-fiber breads and whole grains, nuts, and olive oil. This style of eating includes limited red meat, cheese, and sweets. Why choose the Mediterranean diet? A Mediterranean-style diet may improve heart health. It contains more fat than other heart-healthy diets. But the fats are mainly from nuts, unsaturated oils (such as fish oils and olive oil), and certain nut or seed oils (such as canola, soybean, or flaxseed oil). These fats may help protect the heart and blood vessels. How can you get started on the Mediterranean diet? Here are some things you can do to switch to a more Mediterranean way of eating. What to eat  · Eat a variety of fruits and vegetables each day, such as grapes, blueberries, tomatoes, broccoli, peppers, figs, olives, spinach, eggplant, beans, lentils, and chickpeas. · Eat a variety of whole-grain foods each day, such as oats, brown rice, and whole wheat bread, pasta, and couscous. · Eat fish at least 2 times a week. Try tuna, salmon, mackerel, lake trout, herring, or sardines. · Eat moderate amounts of low-fat dairy products, such as milk, cheese, or yogurt. · Eat moderate amounts of poultry and eggs. · Choose healthy (unsaturated) fats, such as nuts, olive oil, and certain nut or seed oils like canola, soybean, and flaxseed. · Limit unhealthy (saturated) fats, such as butter, palm oil, and coconut oil. And limit fats found in animal products, such as meat and dairy products made with whole milk. Try to eat red meat only a few times a month in very small amounts. · Limit sweets and desserts to only a few times a week.  This includes sugar-sweetened drinks like soda.  The Mediterranean diet may also include red wine with your meal--1 glass each day for women and up to 2 glasses a day for men. Tips for eating at home  · Use herbs, spices, garlic, lemon zest, and citrus juice instead of salt to add flavor to foods. · Add avocado slices to your sandwich instead of schaffer. · Have fish for lunch or dinner instead of red meat. Brush the fish with olive oil, and broil or grill it. · Sprinkle your salad with seeds or nuts instead of cheese. · Cook with olive or canola oil instead of butter or oils that are high in saturated fat. · Switch from 2% milk or whole milk to 1% or fat-free milk. · Dip raw vegetables in a vinaigrette dressing or hummus instead of dips made from mayonnaise or sour cream.  · Have a piece of fruit for dessert instead of a piece of cake. Try baked apples, or have some dried fruit. Tips for eating out  · Try broiled, grilled, baked, or poached fish instead of having it fried or breaded. · Ask your  to have your meals prepared with olive oil instead of butter. · Order dishes made with marinara sauce or sauces made from olive oil. Avoid sauces made from cream or mayonnaise. · Choose whole-grain breads, whole wheat pasta and pizza crust, brown rice, beans, and lentils. · Cut back on butter or margarine on bread. Instead, you can dip your bread in a small amount of olive oil. · Ask for a side salad or grilled vegetables instead of french fries or chips. Where can you learn more? Go to http://www.callahan.com/  Enter O407 in the search box to learn more about \"Learning About the Mediterranean Diet. \"  Current as of: August 22, 2019               Content Version: 12.6  © 4099-9796 [employer]. Care instructions adapted under license by [employer] (which disclaims liability or warranty for this information).  If you have questions about a medical condition or this instruction, always ask your healthcare professional. [address] any warranty or liability for your use of this information.

## 2021-12-13 ENCOUNTER — TELEPHONE (OUTPATIENT)
Dept: FAMILY MEDICINE CLINIC | Age: 51
End: 2021-12-13

## 2021-12-13 DIAGNOSIS — E11.9 CONTROLLED TYPE 2 DIABETES MELLITUS WITHOUT COMPLICATION, WITHOUT LONG-TERM CURRENT USE OF INSULIN (HCC): Primary | ICD-10-CM

## 2021-12-13 RX ORDER — PEN NEEDLE, DIABETIC 31 GX3/16"
NEEDLE, DISPOSABLE MISCELLANEOUS
Qty: 400 PEN NEEDLE | Refills: 1 | Status: SHIPPED | OUTPATIENT
Start: 2021-12-13

## 2021-12-13 NOTE — TELEPHONE ENCOUNTER
Pharmacy Progress Note     Patient called to request order for pen needles; states he just ran out of needles he got after he was hospitalized. States he just started the 1.5 mg dose of Trulicity Saturday; reports no side effects. Will follow-up with patient in 3 weeks to assess tolerability and increase to 3 mg dose if tolerating well. Patient instructed to call me if hypoglycemia occurs at any point before our next visit since he is still using Humalog with meals. Patient confirmed understanding. There are no discontinued medications. Orders Placed This Encounter    Insulin Needles, Disposable, (Saumya Pen Needle) 32 gauge x 5/32\" ndle     Sig: Use to inject insulin 4 times daily     Dispense:  400 Pen Needle     Refill:  1     Thank you,  Colette Gonzales, [address] [place] [address] in place:  Yes   Recommendation Provided To: Patient/Caregiver: 3 via Telephone   Intervention Detail: Adherence Monitorin, New Rx: 1, reason: Needs Additional Therapy and Scheduled Appointment   Intervention Accepted By: Patient/Caregiver: 3   Time Spent (min): 20

## 2021-12-16 ENCOUNTER — OFFICE VISIT (OUTPATIENT)
Dept: CARDIOLOGY CLINIC | Age: 51
End: 2021-12-16
Payer: MEDICAID

## 2021-12-16 VITALS
WEIGHT: 315 LBS | DIASTOLIC BLOOD PRESSURE: 67 MMHG | TEMPERATURE: 98.8 F | RESPIRATION RATE: 18 BRPM | SYSTOLIC BLOOD PRESSURE: 102 MMHG | OXYGEN SATURATION: 97 % | HEART RATE: 86 BPM | BODY MASS INDEX: 41.56 KG/M2

## 2021-12-16 DIAGNOSIS — R06.09 DYSPNEA ON EXERTION: ICD-10-CM

## 2021-12-16 DIAGNOSIS — G47.30 SLEEP APNEA, UNSPECIFIED TYPE: Primary | ICD-10-CM

## 2021-12-16 DIAGNOSIS — I50.22 CHRONIC SYSTOLIC CONGESTIVE HEART FAILURE (HCC): ICD-10-CM

## 2021-12-16 PROCEDURE — 99214 OFFICE O/P EST MOD 30 MIN: CPT | Performed by: INTERNAL MEDICINE
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
## 2021-12-16 NOTE — PATIENT INSTRUCTIONS
Testing   Echo**call office 3-5 days after testing is completed for results**         Other Testing    Referred to Sleep Study

## 2021-12-16 NOTE — PROGRESS NOTES
Identified pt with two pt identifiers(name and ). Reviewed record in preparation for visit and have obtained necessary documentation. Rhea Etienne presents today for   Chief Complaint   Patient presents with    Follow-up     6m       Rhea Etienne preferred language for health care discussion is english/other. Personal Protective Equipment:   Personal Protective Equipment was used including: mask-surgical and hands-gloves. Patient was placed on no precaution(s). Patient was masked. Precautions:   Patient currently on None  Patient currently roomed with door closed    Is someone accompanying this pt? no    Is the patient using any DME equipment during OV? no    Depression Screening:  3 most recent PHQ Screens 2021   Little interest or pleasure in doing things Not at all   Feeling down, depressed, irritable, or hopeless Not at all   Total Score PHQ 2 0       Learning Assessment:  Learning Assessment 2020   PRIMARY LEARNER Patient   HIGHEST LEVEL OF EDUCATION - PRIMARY LEARNER  2 YEARS [address] [employer] LEARNER NONE   CO-LEARNER CAREGIVER No   PRIMARY LANGUAGE ENGLISH   LEARNER PREFERENCE PRIMARY DEMONSTRATION     -   ANSWERED BY Baron Palma   RELATIONSHIP SELF       Abuse Screening:  Abuse Screening Questionnaire 2020   Do you ever feel afraid of your partner? N   Are you in a relationship with someone who physically or mentally threatens you? N   Is it safe for you to go home? Y       Fall Risk  Fall Risk Assessment, last 12 mths 2020   Able to walk? Yes   Fall in past 12 months? No       Pt currently taking Anticoagulant therapy? yes    Coordination of Care:  1. Have you been to the ER, urgent care clinic since your last visit? Hospitalized since your last visit? no    2. Have you seen or consulted any other health care providers outside of the [address] [place] [address] since your last visit? Include any pap smears or colon screening.  no      Please see Red banners under Allergies and Med Rec to remove outside inquires. All correct information has been verified with patient and added to chart.      Medication's patient's would liked removed has been marked not taking to be removed per Verbal order and read back per Aretha Carpio MD

## 2022-01-03 ENCOUNTER — VIRTUAL VISIT (OUTPATIENT)
Dept: FAMILY MEDICINE CLINIC | Age: 52
End: 2022-01-03

## 2022-01-03 DIAGNOSIS — E11.9 CONTROLLED TYPE 2 DIABETES MELLITUS WITHOUT COMPLICATION, WITHOUT LONG-TERM CURRENT USE OF INSULIN (HCC): Primary | ICD-10-CM

## 2022-01-03 RX ORDER — DULAGLUTIDE 3 MG/.5ML
3 INJECTION, SOLUTION SUBCUTANEOUS
Qty: 4 EACH | Refills: 0 | Status: SHIPPED | OUTPATIENT
Start: 2022-01-03 | End: 2022-02-02

## 2022-01-03 NOTE — PROGRESS NOTES
Pharmacy Progress Note - Diabetes Management    S/O: Mr. Elyssa Perez 46 y.o. male, referred by Jose Aceves NP, was contacted today for diabetes management follow up. Patient's last A1c was 8.6% (11/29/21). Interim update: Patient states everything is going great. States his blood sugars are the lowest they have been in a long time and this has got him very excited. Patient reports tolerating the Trulicity well with no side effects. The Trulicity is helping suppress his appetite and he has noticed a 5 pound weight loss over the past few weeks. States he is only eating about one meal per day currently, and does not give the Humalog 5 units if his pre-meal blood sugar is below 100. Current anti-hyperglycemic regimen include(s):    Key Antihyperglycemic Medications             dulaglutide (Trulicity) 1.5 PK/4.6 mL sub-q pen Inject contents of 1 pen (1.5 mg) subcutaneously once every 7 days. metFORMIN (GLUCOPHAGE) 500 mg tablet Take 1 tablet by mouth twice daily    insulin lispro (HUMALOG) 100 unit/mL kwikpen Inject 5 units subcutaneously with meals three times daily. (STOP humalog 75/25 mix)  Indications: type 2 diabetes mellitus    dapagliflozin (Farxiga) 10 mg tab tablet Take 1 Tablet by mouth daily. insulin glargine (LANTUS,BASAGLAR) 100 unit/mL (3 mL) inpn 55 Units by SubCUTAneous route nightly. ROS:  Today, Pt endorses:   - Symptoms of Hyperglycemia: none  - Symptoms of Hypoglycemia: none    Self Monitoring Blood Glucose (SMBG) or CGM:  - Patient tests 2-3 times daily. Reports readings are running between  mg/dL. - Denies any hypoglycemic events    A/P:    Diabetes Management:  - Per ADA guidelines, Pt's A1c is not at goal of < 7%. - Current SMBG trend shows significant improvement in glycemic control with higher Trulicity dose. Since patient tolerating well, will increase Trulicity to 3 mg dose to help facilitate insulin dose reduction and further weight loss.  Patient instructed to only give 3 units of Humalog with a meal until he starts higher Trulicity dose, at which time he can trial stopping Humalog with meals. I suspect we will be able to stop Humalog.  - Continue checking BG 2-3 times daily. Medication reconciliation was completed during the visit. Medications Discontinued During This Encounter   Medication Reason    dulaglutide (Trulicity) 1.5 GR/1.3 mL sub-q pen Therapy Completed     Orders Placed This Encounter    dulaglutide (Trulicity) 3 QH/8.7 mL pnij     Sig: 3 mg by SubCUTAneous route every seven (7) days. Dispense:  4 Each     Refill:  0     Notifications of recommendations will be sent to Mallory Soriano NP for review. Will follow up with patient in 4 week(s). Thank you for the consult,  Manuel Walker, [address] [place] [address] in place:  Yes   Recommendation Provided To: Patient/Caregiver: 4 via Telephone   Intervention Detail: Discontinued Rx: 1, reason: Therapy Complete, Dose Adjustment: 1, reason: Therapy De-escalation, New Rx: 1, reason: Needs Additional Therapy and Scheduled Appointment   Intervention Accepted By: Patient/Caregiver: 4   Time Spent (min): 30

## 2022-01-12 ENCOUNTER — TELEPHONE (OUTPATIENT)
Dept: CARDIOLOGY CLINIC | Age: 52
End: 2022-01-12

## 2022-02-01 DIAGNOSIS — E11.9 CONTROLLED TYPE 2 DIABETES MELLITUS WITHOUT COMPLICATION, WITHOUT LONG-TERM CURRENT USE OF INSULIN (HCC): ICD-10-CM

## 2022-02-01 RX ORDER — METFORMIN HYDROCHLORIDE 500 MG/1
500 TABLET ORAL 2 TIMES DAILY
Qty: 180 TABLET | Refills: 0 | Status: SHIPPED | OUTPATIENT
Start: 2022-02-01 | End: 2022-05-31

## 2022-02-02 DIAGNOSIS — E11.9 CONTROLLED TYPE 2 DIABETES MELLITUS WITHOUT COMPLICATION, WITHOUT LONG-TERM CURRENT USE OF INSULIN (HCC): ICD-10-CM

## 2022-02-02 RX ORDER — DULAGLUTIDE 3 MG/.5ML
INJECTION, SOLUTION SUBCUTANEOUS
Qty: 12 EACH | Refills: 1 | Status: SHIPPED | OUTPATIENT
Start: 2022-02-02 | End: 2022-07-26 | Stop reason: SDUPTHER

## 2022-02-07 ENCOUNTER — VIRTUAL VISIT (OUTPATIENT)
Dept: FAMILY MEDICINE CLINIC | Age: 52
End: 2022-02-07

## 2022-02-07 DIAGNOSIS — E11.9 CONTROLLED TYPE 2 DIABETES MELLITUS WITHOUT COMPLICATION, WITHOUT LONG-TERM CURRENT USE OF INSULIN (HCC): Primary | ICD-10-CM

## 2022-02-07 NOTE — PROGRESS NOTES
Pharmacy Progress Note - Diabetes Management    S/O: Mr. Sergio Vee 46 y.o. male, referred by Stanislaw Christine NP, was contacted today for diabetes management follow up. Patient's last A1c was 8.6%. Interim update: Patient reports he is doing well. He lost his phone last week and had to get a new one and that's why he missed our follow up visit last week. States his blood sugars have been running between  mg/dL recently; denies any recent hypoglycemia. States he is tolerating Trulicity well. Still using Humalog 5 units with meals; uses about 1-2 times per day. Current anti-hyperglycemic regimen includes:    Key Antihyperglycemic Medications             Trulicity 3 HA/7.3 mL pnij INJECT 1 SYRINGE (3MG) SUBCUTANEOUSLY ONCE A WEEK    metFORMIN (GLUCOPHAGE) 500 mg tablet Take 1 Tablet by mouth two (2) times a day. insulin lispro (HUMALOG) 100 unit/mL kwikpen Inject 5 units subcutaneously with meals three times daily. (STOP humalog 75/25 mix)  Indications: type 2 diabetes mellitus    dapagliflozin (Farxiga) 10 mg tab tablet Take 1 Tablet by mouth daily. insulin glargine (LANTUS,BASAGLAR) 100 unit/mL (3 mL) inpn 55 Units by SubCUTAneous route nightly. A/P:    Diabetes Management:  - Pt reported BG readings show patient is well controlled on current therapy. Patient tolerating all medications well with no reported side effects.  - Continue all medications at current doses. Reminded patient that he is to decrease Humalog to 3 units with meals if hypoglycemia occurs after a meal at any point.  - Continue checking glucose in the morning and before meals.  - Bring glucometer/log to all future visits.  - Reminded patient of lab and PCP appointment on 3/29 and 4/5, respectively. Plan to follow up with patient after PCP visit and will determine follow up frequency moving forward based on A1c results.  Patient given my contact information and instructed to call if any questions or concerns arise before then. Medication reconciliation was completed during the visit. There are no discontinued medications. No orders of the defined types were placed in this encounter. Notifications of recommendations will be sent to Elie Gallegos NP for review. Will follow up with patient in 8 weeks. Thank you for the consult,  Ashli Monroy, [address] [place] [address] in place:  Yes   Recommendation Provided To: Patient/Caregiver: 1 via Telephone   Intervention Detail: Adherence Monitorin   Intervention Accepted By: Patient/Caregiver: 1   Time Spent (min): 45

## 2022-02-21 ENCOUNTER — TELEPHONE (OUTPATIENT)
Dept: CARDIOLOGY CLINIC | Age: 52
End: 2022-02-21

## 2022-03-19 PROBLEM — Z91.199 NONCOMPLIANCE WITH DIET AND MEDICATION REGIMEN: Status: ACTIVE | Noted: 2020-03-21

## 2022-03-19 PROBLEM — Z91.148 NONCOMPLIANCE WITH DIET AND MEDICATION REGIMEN: Status: ACTIVE | Noted: 2020-03-21

## 2022-03-19 PROBLEM — Z91.14 NONCOMPLIANCE WITH DIET AND MEDICATION REGIMEN: Status: ACTIVE | Noted: 2020-03-21

## 2022-03-19 PROBLEM — J45.30 MILD PERSISTENT ASTHMA WITHOUT COMPLICATION: Status: ACTIVE | Noted: 2020-03-20

## 2022-03-19 PROBLEM — Z86.16 HISTORY OF 2019 NOVEL CORONAVIRUS DISEASE (COVID-19): Status: ACTIVE | Noted: 2021-08-23

## 2022-03-19 PROBLEM — E11.9 CONTROLLED TYPE 2 DIABETES MELLITUS WITHOUT COMPLICATION, WITHOUT LONG-TERM CURRENT USE OF INSULIN (HCC): Status: ACTIVE | Noted: 2020-10-07

## 2022-03-19 PROBLEM — E66.01 MORBID OBESITY WITH BODY MASS INDEX (BMI) OF 40.0 TO 44.9 IN ADULT (HCC): Status: ACTIVE | Noted: 2020-03-20

## 2022-03-19 PROBLEM — Z91.119 NONCOMPLIANCE WITH DIET AND MEDICATION REGIMEN: Status: ACTIVE | Noted: 2020-03-21

## 2022-03-19 PROBLEM — I11.9 HYPERTENSIVE CARDIOMEGALY: Status: ACTIVE | Noted: 2020-03-20

## 2022-03-30 DIAGNOSIS — I50.9 CHRONIC CONGESTIVE HEART FAILURE, UNSPECIFIED HEART FAILURE TYPE (HCC): ICD-10-CM

## 2022-03-30 RX ORDER — SPIRONOLACTONE 50 MG/1
TABLET, FILM COATED ORAL
Qty: 90 TABLET | Refills: 0 | Status: SHIPPED | OUTPATIENT
Start: 2022-03-30 | End: 2022-06-23

## 2022-04-11 ENCOUNTER — TELEPHONE (OUTPATIENT)
Dept: CARDIOLOGY CLINIC | Age: 52
End: 2022-04-11

## 2022-04-18 DIAGNOSIS — E11.9 CONTROLLED TYPE 2 DIABETES MELLITUS WITHOUT COMPLICATION, WITHOUT LONG-TERM CURRENT USE OF INSULIN (HCC): ICD-10-CM

## 2022-04-19 RX ORDER — INSULIN LISPRO 100 [IU]/ML
INJECTION, SOLUTION INTRAVENOUS; SUBCUTANEOUS
Qty: 15 ML | Refills: 0 | Status: SHIPPED | OUTPATIENT
Start: 2022-04-19 | End: 2022-08-03

## 2022-04-26 ENCOUNTER — OFFICE VISIT (OUTPATIENT)
Dept: FAMILY MEDICINE CLINIC | Age: 52
End: 2022-04-26
Payer: MEDICAID

## 2022-04-26 VITALS
HEART RATE: 80 BPM | DIASTOLIC BLOOD PRESSURE: 76 MMHG | TEMPERATURE: 98.2 F | BODY MASS INDEX: 40.95 KG/M2 | RESPIRATION RATE: 20 BRPM | OXYGEN SATURATION: 95 % | HEIGHT: 73 IN | SYSTOLIC BLOOD PRESSURE: 136 MMHG | WEIGHT: 309 LBS

## 2022-04-26 DIAGNOSIS — E78.00 HYPERCHOLESTEROLEMIA: ICD-10-CM

## 2022-04-26 DIAGNOSIS — E11.9 CONTROLLED TYPE 2 DIABETES MELLITUS WITHOUT COMPLICATION, WITHOUT LONG-TERM CURRENT USE OF INSULIN (HCC): Primary | ICD-10-CM

## 2022-04-26 DIAGNOSIS — I50.9 CHRONIC CONGESTIVE HEART FAILURE, UNSPECIFIED HEART FAILURE TYPE (HCC): ICD-10-CM

## 2022-04-26 DIAGNOSIS — I10 PRIMARY HYPERTENSION: ICD-10-CM

## 2022-04-26 PROCEDURE — 3044F HG A1C LEVEL LT 7.0%: CPT | Performed by: NURSE PRACTITIONER

## 2022-04-26 PROCEDURE — 99214 OFFICE O/P EST MOD 30 MIN: CPT | Performed by: NURSE PRACTITIONER

## 2022-04-26 RX ORDER — FLUTICASONE FUROATE 100 UG/1
1 POWDER RESPIRATORY (INHALATION) DAILY
Qty: 30 EACH | Refills: 5 | Status: SHIPPED | OUTPATIENT
Start: 2022-04-26

## 2022-04-26 NOTE — PROGRESS NOTES
Room 9    Patient states I my dad has 2 weeks to live and my uncle just passed away. Did patient bring someone? No    Did the patient have DME equipment? Yes cane     Did you take your medication today? Yes       1. \"Have you been to the ER, urgent care clinic since your last visit? Hospitalized since your last visit? \" No    2. \"Have you seen or consulted any other health care providers outside of the [address] [place] [address] since your last visit? \" No     3. For patients aged 39-70: Has the patient had a colonoscopy / FIT/ Cologuard? No Patient states no due to family issues I do not want a colonoscopy . If the patient is female:    4. For patients aged 41-77: Has the patient had a mammogram within the past 2 years? No      5. For patients aged 21-65: Has the patient had a pap smear?  NA - based on age or sex        1 most recent PHQ Screens 4/26/2022   Little interest or pleasure in doing things Several days   Feeling down, depressed, irritable, or hopeless Several days   Total Score PHQ 2 2         Health Maintenance Due   Topic Date Due    COVID-19 Vaccine (1) Never done    Foot Exam Q1  Never done    Eye Exam Retinal or Dilated  Never done    DTaP/Tdap/Td series (1 - Tdap) Never done    Colorectal Cancer Screening Combo  Never done    Pneumococcal 0-64 years (2 - PCV) 08/09/2014    Shingrix Vaccine Age 50> (1 of 2) Never done       Learning Assessment 11/2/2020   PRIMARY LEARNER Patient   HIGHEST LEVEL OF EDUCATION - PRIMARY LEARNER  2 YEARS OF COLLEGE   BARRIERS PRIMARY LEARNER NONE   CO-LEARNER CAREGIVER No   PRIMARY LANGUAGE ENGLISH   LEARNER PREFERENCE PRIMARY DEMONSTRATION     -   ANSWERED BY Alejandro Stanley   RELATIONSHIP SELF

## 2022-04-26 NOTE — PROGRESS NOTES
[address] [place] [address]      Maylin Ramey is a 46 y.o. male and presents with No chief complaint on file. Assessment/Plan:    Diagnoses and all orders for this visit:    1. Controlled type 2 diabetes mellitus without complication, without long-term current use of insulin (HCC)  -     dapagliflozin (Farxiga) 10 mg tab tablet; Take 1 Tablet by mouth daily.  -     HEMOGLOBIN A1C WITH EAG; Future  -     MICROALBUMIN, UR, RAND W/ MICROALB/CREAT RATIO; Future  Endorses medication compliance, Refill provided, Follow up labs prior to next visit, Denies signs and symptoms of hyper or hypoglycemia and Diabetes controlled, A1C <7   2. Primary hypertension  -     METABOLIC PANEL, COMPREHENSIVE; Future  Endorses medication compliance, Follow up labs prior to next visit and Blood pressure stable, continue aldactone, amlodipine, imdur, losartan, hydralazine, coreg   3. Hypercholesterolemia  -     LIPID PANEL; Future   Endorses medication compliance, Follow up labs prior to next visit and Denies abdominal pain or symptoms of myalgia   4. Chronic congestive heart failure, unspecified heart failure type ([facility] [address]  -     dapagliflozin (Farxiga) 10 mg tab tablet; Take 1 Tablet by mouth daily. Other orders  -     fluticasone furoate (Arnuity Ellipta) 100 mcg/actuation dsdv inhaler; Take 1 Puff by inhalation daily. Follow-up and Dispositions    · Return in about 3 months (around 7/26/2022) for DM, DM foot, HLD, HTN, 30 min, office only, with, labs prior.            Health Maintenance:   Health Maintenance   Topic Date Due    COVID-19 Vaccine (1) Never done    Foot Exam Q1  Never done    Eye Exam Retinal or Dilated  Never done    DTaP/Tdap/Td series (1 - Tdap) Never done    Colorectal Cancer Screening Combo  Never done    Pneumococcal 0-64 years (2 - PCV) 08/09/2014    Shingrix Vaccine Age 50> (1 of 2) Never done    Flu Vaccine (Season Ended) 09/01/2022    A1C test (Diabetic or Prediabetic)  03/29/2023    MICROALBUMIN Q1  03/29/2023    Lipid Screen  03/29/2023    Depression Screen  04/26/2023    Hepatitis C Screening  Completed        Subjective:    Labs obtained prior to visit? YES  Reviewed with patient? Yes    Life stressors  Uncle just passed away  Found out his dad has about 2 weeks left to live    DMII-   Patient reports medication compliance Daily  Diabetic diet compliance most of the time  Patient monitors blood sugars regularly BID   Reports am fasting sugars range am: 143  pm: 117   Denies hypoglycemic episodes yes  Denies polyuria, polydipsia, paraesthesia, vision changes? no, endorses intermittent paresthesia to left foot last three toes. Denies changing shoes, happens when barefoot also  Engaging in daily exercise?  Yes: Comment: walking around the block     Diabetic Foot and Eye Exam HM Status   Topic Date Due    Diabetic Foot Care  Never done    Eye Exam  Never done     Hemoglobin A1c   Date Value Ref Range Status   03/29/2022 6.9 (H) 4.8 - 5.6 % Final     Comment:              Prediabetes: 5.7 - 6.4           Diabetes: >6.4           Glycemic control for adults with diabetes: <7.0       Hemoglobin A1c, External   Date Value Ref Range Status   08/20/2021 14.0 % Final     Comment:     In Patient Iliacus.Alpers   ]  Creatinine, urine   Date Value Ref Range Status   03/29/2022 189.9 Not Estab. mg/dL Final     Microalb/Creat ratio (ug/mg creat.)   Date Value Ref Range Status   03/29/2022 97 (H) 0 - 29 mg/g creat Final     Comment:                            Normal:                0 -  29                         Moderately increased: 30 - 300                         Severely increased:       >300     11/29/2021 26 0 - 29 mg/g creat Final     Comment:                            Normal:                0 -  29                         Moderately increased: 30 - 300                         Severely increased:       >300       Key Antihyperglycemic Medications             dapagliflozin (Farxiga) 10 mg tab tablet (Taking) Take 1 Tablet by mouth daily. insulin lispro (HUMALOG) 100 unit/mL kwikpen (Taking) INJECT 5 UNITS SUBCUTANEOUSLY WITH MEALS THREE TIMES DAILY (STOP HUMALOG 76/)    Trulicity 3 BD/6.8 mL pnij (Taking) INJECT 1 SYRINGE (3MG) SUBCUTANEOUSLY ONCE A WEEK    metFORMIN (GLUCOPHAGE) 500 mg tablet (Taking) Take 1 Tablet by mouth two (2) times a day. insulin glargine (LANTUS,BASAGLAR) 100 unit/mL (3 mL) inpn (Taking) 55 Units by SubCUTAneous route nightly. Hypertension:  Visit Vitals  /76 (BP 1 Location: Left arm, BP Patient Position: Sitting, BP Cuff Size: Adult) Comment (BP Patient Position): feet flat on the floor   Pulse 80   Temp 98.2 °F (36.8 °C) (Temporal)   Resp 20   Ht 6' 1\" (1.854 m)   Wt 309 lb (140.2 kg)   SpO2 95%   BMI 40.77 kg/m²      Patient reports taking medications as instructed. yes   Medication side effects noted. no  Headache upon wakening. no   Home BP monitorin/87  Do you experience chest pain/pressure or SOB with exertion? yes, after he got his fathers bad news, this was a one time occurrence, lasted for about 1 hour, no medication interventions just relaxed  Maintain a low Sodium diet? yes  Key CAD CHF Meds             spironolactone (ALDACTONE) 50 mg tablet (Taking) Take 1 tablet by mouth once daily    amLODIPine (NORVASC) 10 mg tablet (Taking) Take 1 Tablet by mouth daily. isosorbide mononitrate ER (IMDUR) 30 mg tablet (Taking) Take 1 Tablet by mouth every morning. losartan (COZAAR) 100 mg tablet (Taking) Take 1 Tablet by mouth daily. rivaroxaban (XARELTO) 20 mg tab tablet (Taking) Take 1 Tablet by mouth daily (with breakfast). hydrALAZINE (APRESOLINE) 50 mg tablet (Taking) Take 1 Tablet by mouth three (3) times daily. atorvastatin (LIPITOR) 40 mg tablet (Taking) Take 1 Tablet by mouth nightly.     carvediloL (COREG) 25 mg tablet (Taking) Take 1 Tablet by mouth two (2) times daily (with meals). furosemide (LASIX) 40 mg tablet (Taking) Take 1 Tab by mouth daily. BUN   Date Value Ref Range Status   03/29/2022 17 6 - 24 mg/dL Final     Creatinine   Date Value Ref Range Status   03/29/2022 1.21 0.76 - 1.27 mg/dL Final     GFR est AA   Date Value Ref Range Status   11/29/2021 87 >59 mL/min/1.73 Final     Comment:     **In accordance with recommendations from the NKF-ASN Task force,**    [facility] is in [facility] process [facility] updating its eGFR calculation to the    2021 CKD-EPI creatinine equation that estimates kidney function    without a race variable. Potassium   Date Value Ref Range Status   03/29/2022 4.2 3.5 - 5.2 mmol/L Final       HLD:  Has been compliant with meds  Yes  Compliant with low-fat diet. most of the time    Denies myalgias or other side effects. yes  The 10-year ASCVD risk score (Anton Otto, et al., 2013) is: 20.6%    Cholesterol, total   Date Value Ref Range Status   03/29/2022 134 100 - 199 mg/dL Final     Triglyceride   Date Value Ref Range Status   03/29/2022 144 0 - 149 mg/dL Final     HDL Cholesterol   Date Value Ref Range Status   03/29/2022 32 (L) >39 mg/dL Final     LDL, calculated   Date Value Ref Range Status   03/29/2022 77 0 - 99 mg/dL Final   ]  Key Antihyperlipidemia Meds             atorvastatin (LIPITOR) 40 mg tablet (Taking) Take 1 Tablet by mouth nightly. ROS:     ROS  As stated in HPI, otherwise all others negative. The problem list was updated as a part of today's visit.   Patient Active Problem List   Diagnosis Code    Hypertension I10    Arrhythmia I49.9    Asthma J45.909    Chronic combined systolic and diastolic congestive heart failure (HCC) I50.42    Hypercholesterolemia E78.00    GERD (gastroesophageal reflux disease) K21.9    Chronic obstructive pulmonary disease (HCC) J44.9    Dyslipidemia E78.5    Paroxysmal atrial fibrillation (HCC) I48.0    Cardiomyopathy ([facility] [address] I42.9    Controlled type 2 diabetes mellitus without complication, without long-term current use of insulin (HCC) E11.9    History of sleep apnea Z86.69    Hypertensive cardiomegaly I11.9    Mild persistent asthma without complication F55.29    Morbid obesity with body mass index (BMI) of 40.0 to 44.9 in adult ([facility]) E66.01, Z68.41    Noncompliance with diet and medication regimen Z91.11, Z91.14    Pulmonary hypertension (HCC) I27.20    History of 2019 novel coronavirus disease (COVID-19) Z86.16    Chronic congestive heart failure (HCC) I50.9       The PSH, FH were reviewed. SH:  Social History     Tobacco Use    Smoking status: Former Smoker     Types: Cigars    Smokeless tobacco: Never Used    Tobacco comment: black N mild    Substance Use Topics    Alcohol use: Yes     Alcohol/week: 0.0 standard drinks     Comment: Socially    Drug use: No       Medications/Allergies:  Current Outpatient Medications on File Prior to Visit   Medication Sig Dispense Refill    insulin lispro (HUMALOG) 100 unit/mL kwikpen INJECT 5 UNITS SUBCUTANEOUSLY WITH MEALS THREE TIMES DAILY (STOP HUMALOG 75/25) 15 mL 0    spironolactone (ALDACTONE) 50 mg tablet Take 1 tablet by mouth once daily 90 Tablet 0    Trulicity 3 XI/4.8 mL pnij INJECT 1 SYRINGE (3MG) SUBCUTANEOUSLY ONCE A WEEK 12 Each 1    metFORMIN (GLUCOPHAGE) 500 mg tablet Take 1 Tablet by mouth two (2) times a day. 180 Tablet 0    Insulin Needles, Disposable, (Saumya Pen Needle) 32 gauge x 5/32\" ndle Use to inject insulin 4 times daily 400 Pen Needle 1    amLODIPine (NORVASC) 10 mg tablet Take 1 Tablet by mouth daily. 90 Tablet 1    isosorbide mononitrate ER (IMDUR) 30 mg tablet Take 1 Tablet by mouth every morning. 90 Tablet 1    losartan (COZAAR) 100 mg tablet Take 1 Tablet by mouth daily. 90 Tablet 1    rivaroxaban (XARELTO) 20 mg tab tablet Take 1 Tablet by mouth daily (with breakfast).  90 Tablet 3    OneTouch Verio test strips strip Use to test blood sugar 4 times daily 200 Strip 5    lancets (OneTouch Delica Plus Lancet) 33 gauge misc Use to test blood sugar 4 times daily 200 Each 5    ipratropium (Atrovent HFA) 17 mcg/actuation inhaler Take 1 Puff by inhalation every six (6) hours as needed for Wheezing.  albuterol (PROVENTIL HFA, VENTOLIN HFA, PROAIR HFA) 90 mcg/actuation inhaler Take 1-2 puffs every 4-6 hrs prn shortness of breath 1 Each 3    hydrALAZINE (APRESOLINE) 50 mg tablet Take 1 Tablet by mouth three (3) times daily. 270 Tablet 0    insulin glargine (LANTUS,BASAGLAR) 100 unit/mL (3 mL) inpn 55 Units by SubCUTAneous route nightly.  atorvastatin (LIPITOR) 40 mg tablet Take 1 Tablet by mouth nightly. 90 Tablet 1    carvediloL (COREG) 25 mg tablet Take 1 Tablet by mouth two (2) times daily (with meals). 180 Tablet 3    furosemide (LASIX) 40 mg tablet Take 1 Tab by mouth daily. 90 Tab 3     No current facility-administered medications on file prior to visit. Allergies   Allergen Reactions    Shellfish Derived Hives       Objective:  Visit Vitals  /76 (BP 1 Location: Left arm, BP Patient Position: Sitting, BP Cuff Size: Adult) Comment (BP Patient Position): feet flat on the floor   Pulse 80   Temp 98.2 °F (36.8 °C) (Temporal)   Resp 20   Ht 6' 1\" (1.854 m)   Wt 309 lb (140.2 kg)   SpO2 95%   BMI 40.77 kg/m²    Body mass index is 40.77 kg/m². Physical assessment  Physical Exam  Vitals and nursing note reviewed. Eyes:      Conjunctiva/sclera: Conjunctivae normal.      Pupils: Pupils are equal, round, and reactive to light. Cardiovascular:      Rate and Rhythm: Normal rate and regular rhythm. Heart sounds: Normal heart sounds. No murmur heard. No friction rub. No gallop. Pulmonary:      Effort: Pulmonary effort is normal.      Breath sounds: Normal breath sounds. Musculoskeletal:         General: Normal range of motion. Cervical back: Normal range of motion. Skin:     General: Skin is warm and dry. Neurological:      Mental Status: He is alert. Labwork and Ancillary Studies:    CBC w/Diff  Lab Results   Component Value Date/Time    WBC 5.7 02/12/2021 12:14 PM    HGB 14.4 02/12/2021 12:14 PM    PLATELET 478 45/25/1993 12:14 PM         Basic Metabolic Profile  Lab Results   Component Value Date/Time    Sodium 145 (H) 03/29/2022 09:53 AM    Potassium 4.2 03/29/2022 09:53 AM    Chloride 109 (H) 03/29/2022 09:53 AM    CO2 20 03/29/2022 09:53 AM    Anion gap 6 02/12/2021 12:14 PM    Glucose 131 (H) 03/29/2022 09:53 AM    BUN 17 03/29/2022 09:53 AM    Creatinine 1.21 03/29/2022 09:53 AM    BUN/Creatinine ratio 14 03/29/2022 09:53 AM    GFR est AA 87 11/29/2021 12:00 AM    GFR est non-AA 75 11/29/2021 12:00 AM    Calcium 9.0 03/29/2022 09:53 AM        Cholesterol  Lab Results   Component Value Date/Time    Cholesterol, total 134 03/29/2022 09:53 AM    HDL Cholesterol 32 (L) 03/29/2022 09:53 AM    LDL, calculated 77 03/29/2022 09:53 AM    Triglyceride 144 03/29/2022 09:53 AM           I have discussed the diagnosis with the patient and the intended plan as seen in the above orders. The patient has received an After-Visit Summary and questions were answered concerning future plans. An After Visit Summary was printed and given to the patient. All diagnosis have been discussed with the patient and all of the patient's questions have been answered. Follow-up and Dispositions    · Return in about 3 months (around 7/26/2022) for DM, DM foot, HLD, HTN, 30 min, office only, with, labs prior. Mary[facility] Lundy, [facility]  [address] [facility]   [address] [facility] [address]  [address]

## 2022-05-04 ENCOUNTER — TELEPHONE (OUTPATIENT)
Dept: FAMILY MEDICINE CLINIC | Age: 52
End: 2022-05-04

## 2022-05-05 ENCOUNTER — TELEPHONE (OUTPATIENT)
Dept: FAMILY MEDICINE CLINIC | Age: 52
End: 2022-05-05

## 2022-06-23 DIAGNOSIS — I10 ESSENTIAL HYPERTENSION: ICD-10-CM

## 2022-06-23 RX ORDER — AMLODIPINE BESYLATE 10 MG/1
TABLET ORAL
Qty: 90 TABLET | Refills: 0 | Status: SHIPPED | OUTPATIENT
Start: 2022-06-23 | End: 2022-06-28

## 2022-06-23 RX ORDER — ISOSORBIDE MONONITRATE 30 MG/1
TABLET, EXTENDED RELEASE ORAL
Qty: 90 TABLET | Refills: 0 | Status: SHIPPED | OUTPATIENT
Start: 2022-06-23 | End: 2022-09-26 | Stop reason: SDUPTHER

## 2022-06-23 RX ORDER — LOSARTAN POTASSIUM 100 MG/1
TABLET ORAL
Qty: 90 TABLET | Refills: 0 | Status: SHIPPED | OUTPATIENT
Start: 2022-06-23 | End: 2022-06-28

## 2022-06-27 DIAGNOSIS — I10 ESSENTIAL HYPERTENSION: ICD-10-CM

## 2022-06-28 DIAGNOSIS — I10 ESSENTIAL HYPERTENSION: ICD-10-CM

## 2022-06-28 RX ORDER — LOSARTAN POTASSIUM 100 MG/1
TABLET ORAL
Qty: 90 TABLET | Refills: 0 | Status: SHIPPED | OUTPATIENT
Start: 2022-06-28

## 2022-06-28 RX ORDER — AMLODIPINE BESYLATE 10 MG/1
TABLET ORAL
Qty: 90 TABLET | Refills: 0 | Status: SHIPPED | OUTPATIENT
Start: 2022-06-28

## 2022-07-26 ENCOUNTER — OFFICE VISIT (OUTPATIENT)
Dept: FAMILY MEDICINE CLINIC | Age: 52
End: 2022-07-26
Payer: MEDICAID

## 2022-07-26 VITALS
OXYGEN SATURATION: 97 % | RESPIRATION RATE: 20 BRPM | BODY MASS INDEX: 40.69 KG/M2 | HEIGHT: 73 IN | TEMPERATURE: 98.3 F | HEART RATE: 81 BPM | DIASTOLIC BLOOD PRESSURE: 76 MMHG | SYSTOLIC BLOOD PRESSURE: 127 MMHG | WEIGHT: 307 LBS

## 2022-07-26 DIAGNOSIS — E78.00 HYPERCHOLESTEROLEMIA: ICD-10-CM

## 2022-07-26 DIAGNOSIS — E11.9 CONTROLLED TYPE 2 DIABETES MELLITUS WITHOUT COMPLICATION, WITHOUT LONG-TERM CURRENT USE OF INSULIN (HCC): ICD-10-CM

## 2022-07-26 DIAGNOSIS — I10 PRIMARY HYPERTENSION: Primary | ICD-10-CM

## 2022-07-26 DIAGNOSIS — M54.6 ACUTE BILATERAL THORACIC BACK PAIN: ICD-10-CM

## 2022-07-26 DIAGNOSIS — Z12.11 COLON CANCER SCREENING: ICD-10-CM

## 2022-07-26 LAB
BILIRUB UR QL STRIP: NEGATIVE
GLUCOSE UR-MCNC: NEGATIVE MG/DL
KETONES P FAST UR STRIP-MCNC: NEGATIVE MG/DL
PH UR STRIP: 6 [PH] (ref 4.6–8)
PROT UR QL STRIP: NEGATIVE
SP GR UR STRIP: 1.02 (ref 1–1.03)
UA UROBILINOGEN AMB POC: NORMAL (ref 0.2–1)
URINALYSIS CLARITY POC: CLEAR
URINALYSIS COLOR POC: YELLOW
URINE BLOOD POC: NEGATIVE
URINE LEUKOCYTES POC: NEGATIVE
URINE NITRITES POC: NEGATIVE

## 2022-07-26 PROCEDURE — 99214 OFFICE O/P EST MOD 30 MIN: CPT | Performed by: NURSE PRACTITIONER

## 2022-07-26 PROCEDURE — 3044F HG A1C LEVEL LT 7.0%: CPT | Performed by: NURSE PRACTITIONER

## 2022-07-26 PROCEDURE — 81003 URINALYSIS AUTO W/O SCOPE: CPT | Performed by: NURSE PRACTITIONER

## 2022-07-26 RX ORDER — HYDRALAZINE HYDROCHLORIDE 50 MG/1
50 TABLET, FILM COATED ORAL 3 TIMES DAILY
Qty: 270 TABLET | Refills: 3 | Status: SHIPPED | OUTPATIENT
Start: 2022-07-26

## 2022-07-26 RX ORDER — INSULIN GLARGINE 100 [IU]/ML
55 INJECTION, SOLUTION SUBCUTANEOUS
Qty: 20 ADJUSTABLE DOSE PRE-FILLED PEN SYRINGE | Refills: 3 | Status: SHIPPED | OUTPATIENT
Start: 2022-07-26

## 2022-07-26 RX ORDER — DULAGLUTIDE 3 MG/.5ML
3 INJECTION, SOLUTION SUBCUTANEOUS
Qty: 12 EACH | Refills: 1 | Status: SHIPPED | OUTPATIENT
Start: 2022-07-26

## 2022-07-26 NOTE — PROGRESS NOTES
Room 7    Patient present today for Point of Care Urinalysis      When asked if patient has any concerns he would like to address with DAYO Linares patient states  yes the Trulicity is not working . Did patient bring someone? No    Did the patient have DME equipment? Yes cane     Did you take your medication today? Yes       1. \"Have you been to the ER, urgent care clinic since your last visit? Hospitalized since your last visit? \" No    2. \"Have you seen or consulted any other health care providers outside of the [address] [place] [address] since your last visit? \" No     3. For patients aged 39-70: Has the patient had a colonoscopy / FIT/ Cologuard? No      If the patient is female:    4. For patients aged 41-77: Has the patient had a mammogram within the past 2 years? NA - based on age or sex      11. For patients aged 21-65: Has the patient had a pap smear?  NA - based on age or sex        1 most recent PHQ Screens 7/26/2022   Little interest or pleasure in doing things Several days   Feeling down, depressed, irritable, or hopeless Several days   Total Score PHQ 2 2         Health Maintenance Due   Topic Date Due    COVID-19 Vaccine (1) Never done    Foot Exam Q1  Never done    Eye Exam Retinal or Dilated  Never done    DTaP/Tdap/Td series (1 - Tdap) Never done    Colorectal Cancer Screening Combo  Never done    Pneumococcal 0-64 years (2 - PCV) 08/09/2014    Shingrix Vaccine Age 50> (1 of 2) Never done       Learning Assessment 11/2/2020   PRIMARY LEARNER Patient   HIGHEST LEVEL OF EDUCATION - PRIMARY LEARNER  2 YEARS OF COLLEGE   BARRIERS PRIMARY LEARNER NONE   CO-LEARNER CAREGIVER No   PRIMARY LANGUAGE ENGLISH   LEARNER PREFERENCE PRIMARY DEMONSTRATION     -   ANSWERED BY Dann Garcia   RELATIONSHIP SELF

## 2022-07-26 NOTE — PROGRESS NOTES
[address] [place] [address]. Robb Wagoner is a 46 y.o. male and presents with No chief complaint on file. Assessment/Plan:    Diagnoses and all orders for this visit:    1. Primary hypertension  -     hydrALAZINE (APRESOLINE) 50 mg tablet; Take 1 Tablet by mouth three (3) times daily.  -     METABOLIC PANEL, COMPREHENSIVE; Future  Endorses medication compliance, Refill provided, and Follow up labs prior to next visit, blood pressure controlled     2. Controlled type 2 diabetes mellitus without complication, without long-term current use of insulin (HCC)  -     REFERRAL TO OPHTHALMOLOGY  -     insulin glargine (LANTUS,BASAGLAR) 100 unit/mL (3 mL) inpn; 55 Units by SubCUTAneous route nightly. -     dulaglutide (Trulicity) 3 SC/0.2 mL pnij; 3 mg by SubCUTAneous route every seven (7) days.  -     HEMOGLOBIN A1C WITH EAG; Future  Endorses medication compliance, Refill provided, Follow up labs prior to next visit, and Diabetes controlled, A1C <7     3. Hypercholesterolemia  -     LIPID PANEL; Future  Endorses medication compliance, Follow up labs prior to next visit, and Denies abdominal pain or symptoms of myalgia     4. Colon cancer screening  -     COLOGUARD TEST (FECAL DNA COLORECTAL CANCER SCREENING)  Health maintenance needs addressed     5. Acute bilateral thoracic back pain  -     AMB POC URINALYSIS DIP STICK AUTO W/O MICRO  UA negative for UTI, most likely MS, handout on stretches and exercises to perform  Patient verbalized understanding and is in agreement with this plan of care      Follow-up and Dispositions    Return for ASAP, appt w/ helen, AND, 4month, DM, DM foot, HLD, HTN, 30 min, office only, w/labs prior.            Health Maintenance:   Health Maintenance   Topic Date Due    COVID-19 Vaccine (1) Never done    Foot Exam Q1  Never done    DTaP/Tdap/Td series (1 - Tdap) Never done    Colorectal Cancer Screening Combo  Never done Pneumococcal 0-64 years (2 - PCV) 08/09/2014    Shingrix Vaccine Age 50> (1 of 2) Never done    Eye Exam Retinal or Dilated  11/01/2022 (Originally 12/2/1980)    Flu Vaccine (1) 09/01/2022    Depression Screen  04/26/2023    A1C test (Diabetic or Prediabetic)  07/19/2023    MICROALBUMIN Q1  07/19/2023    Lipid Screen  07/19/2023    Hepatitis C Screening  Completed        Subjective:    Labs obtained prior to visit? YES  Reviewed with patient? Yes    DMII-   Patient reports medication compliance Daily  Diabetic diet compliance most of the time  Patient monitors blood sugars regularly TID   Reports am fasting sugars range  AM: 138  afternoon: 122 PM: 122   Denies hypoglycemic episodes yes  Denies polyuria, polydipsia, paraesthesia, vision changes? yes  Engaging in daily exercise?  Yes: Comment: walking about 2-3 blocks a day     Diabetic Foot and Eye Exam HM Status   Topic Date Due    Diabetic Foot Care  Never done    Eye Exam  11/01/2022 (Originally 12/2/1980)     Hemoglobin A1c   Date Value Ref Range Status   07/19/2022 6.9 (H) 4.8 - 5.6 % Final     Comment:              Prediabetes: 5.7 - 6.4           Diabetes: >6.4           Glycemic control for adults with diabetes: <7.0       Hemoglobin A1c, External   Date Value Ref Range Status   08/20/2021 14.0 % Final     Comment:     In Patient [facility]   ]  Creatinine, urine   Date Value Ref Range Status   07/19/2022 172.9 Not Estab. mg/dL Final     Microalb/Creat ratio (ug/mg creat.)   Date Value Ref Range Status   07/19/2022 28 0 - 29 mg/g creat Final     Comment:                            Normal:                0 -  29                         Moderately increased: 30 - 300                         Severely increased:       >300     03/29/2022 97 (H) 0 - 29 mg/g creat Final     Comment:                            Normal:                0 -  29                         Moderately increased: 30 - 300                         Severely increased:       >300     11/29/2021 26 0 - 29 mg/g creat Final     Comment:                            Normal:                0 -  29                         Moderately increased: 30 - 300                         Severely increased:       >300       Key Antihyperglycemic Medications               insulin glargine (LANTUS,BASAGLAR) 100 unit/mL (3 mL) inpn (Taking) 55 Units by SubCUTAneous route nightly. dulaglutide (Trulicity) 3 MA/7.6 mL pnij (Taking) 3 mg by SubCUTAneous route every seven (7) days. metFORMIN (GLUCOPHAGE) 500 mg tablet (Taking) Take 1 tablet by mouth twice daily    dapagliflozin (Farxiga) 10 mg tab tablet (Taking) Take 1 Tablet by mouth daily. insulin lispro (HUMALOG) 100 unit/mL kwikpen (Taking) INJECT 5 UNITS SUBCUTANEOUSLY WITH MEALS THREE TIMES DAILY (STOP HUMALOG 75/25)        Hypertension:  Visit Vitals  /76 (BP 1 Location: Right arm, BP Patient Position: Sitting, BP Cuff Size: Adult) Comment (BP Patient Position): feet flat on floor   Pulse 81   Temp 98.3 °F (36.8 °C) (Temporal)   Resp 20   Ht 6' 1\" (1.854 m)   Wt 307 lb (139.3 kg)   SpO2 97%   BMI 40.50 kg/m²      Patient reports taking medications as instructed. yes   Medication side effects noted. no  Headache upon wakening. no   Home BP monitorin sbp  Do you experience chest pain/pressure or SOB with exertion? yes, states he feels a pressure when he walks, this has been ongoing for months, he will make appointment to see cardiology  Maintain a low Sodium diet? yes  Key CAD CHF Meds               hydrALAZINE (APRESOLINE) 50 mg tablet (Taking) Take 1 Tablet by mouth three (3) times daily.     losartan (COZAAR) 100 mg tablet (Taking) Take 1 tablet by mouth once daily    amLODIPine (NORVASC) 10 mg tablet (Taking) Take 1 tablet by mouth once daily    spironolactone (ALDACTONE) 50 mg tablet (Taking) Take 1 tablet by mouth once daily    atorvastatin (LIPITOR) 40 mg tablet (Taking) Take 1 tablet by mouth nightly    isosorbide mononitrate ER (IMDUR) 30 mg tablet (Taking) TAKE 1 TABLET BY MOUTH ONCE DAILY IN THE MORNING    rivaroxaban (XARELTO) 20 mg tab tablet (Taking) Take 1 Tablet by mouth daily (with breakfast). carvediloL (COREG) 25 mg tablet (Taking) Take 1 Tablet by mouth two (2) times daily (with meals). furosemide (LASIX) 40 mg tablet (Taking) Take 1 Tab by mouth daily. BUN   Date Value Ref Range Status   07/19/2022 13 6 - 24 mg/dL Final     Creatinine   Date Value Ref Range Status   07/19/2022 1.02 0.76 - 1.27 mg/dL Final     GFR est AA   Date Value Ref Range Status   11/29/2021 87 >59 mL/min/1.73 Final     Comment:     **In accordance with recommendations from the NKF-ASN Task force,**    [facility] its eGFR calculation to the    2021 CKD-EPI creatinine equation that estimates kidney function    without a race variable. Potassium   Date Value Ref Range Status   07/19/2022 4.5 3.5 - 5.2 mmol/L Final         HLD:  Has been compliant with meds  Yes  Compliant with low-fat diet. most of the time    Denies myalgias or other side effects. yes  The 10-year ASCVD risk score ([address] et al., 2013) is: 17.6%    Cholesterol, total   Date Value Ref Range Status   07/19/2022 137 100 - 199 mg/dL Final     Triglyceride   Date Value Ref Range Status   07/19/2022 111 0 - 149 mg/dL Final     HDL Cholesterol   Date Value Ref Range Status   07/19/2022 38 (L) >39 mg/dL Final     LDL, calculated   Date Value Ref Range Status   07/19/2022 79 0 - 99 mg/dL Final   ]  Key Antihyperlipidemia Meds               atorvastatin (LIPITOR) 40 mg tablet (Taking) Take 1 tablet by mouth nightly           Bilateral flank pain  Check urine for UTI  Onset: a couple of days ago  Thinks it could be from the way he was sleeping on his bed, or walking up and down the steps-he has been walking the steps more recently  Has not tried any treatments to relieve the pain    ROS:     ROS  As stated in HPI, otherwise all others negative.      The problem list was updated as a part of today's visit. Patient Active Problem List   Diagnosis Code    Hypertension I10    Arrhythmia I49.9    Asthma J45.909    Chronic combined systolic and diastolic congestive heart failure (HCC) I50.42    Hypercholesterolemia E78.00    GERD (gastroesophageal reflux disease) K21.9    Chronic obstructive pulmonary disease (HCC) J44.9    Dyslipidemia E78.5    Paroxysmal atrial fibrillation (HCC) I48.0    Cardiomyopathy ([facility] I42.9    Controlled type 2 diabetes mellitus without complication, without long-term current use of insulin (HCC) E11.9    History of sleep apnea Z86.69    Hypertensive cardiomegaly I11.9    Mild persistent asthma without complication K29.09    Morbid obesity with body mass index (BMI) of 40.0 to 44.9 in adult ([facility] E66.01, Z68.41    Noncompliance with diet and medication regimen Z91.11, Z91.14    Pulmonary hypertension (HCC) I27.20    History of 2019 novel coronavirus disease (COVID-19) Z86.16    Chronic congestive heart failure (HCC) I50.9       The PSH, FH were reviewed. SH:  Social History     Tobacco Use    Smoking status: Former     Types: Cigars    Smokeless tobacco: Never    Tobacco comments:     black N mild    Substance Use Topics    Alcohol use:  Yes     Alcohol/week: 0.0 standard drinks     Comment: Socially    Drug use: No       Medications/Allergies:  Current Outpatient Medications on File Prior to Visit   Medication Sig Dispense Refill    losartan (COZAAR) 100 mg tablet Take 1 tablet by mouth once daily 90 Tablet 0    amLODIPine (NORVASC) 10 mg tablet Take 1 tablet by mouth once daily 90 Tablet 0    spironolactone (ALDACTONE) 50 mg tablet Take 1 tablet by mouth once daily 90 Tablet 3    atorvastatin (LIPITOR) 40 mg tablet Take 1 tablet by mouth nightly 90 Tablet 3    isosorbide mononitrate ER (IMDUR) 30 mg tablet TAKE 1 TABLET BY MOUTH ONCE DAILY IN THE MORNING 90 Tablet 0    metFORMIN (GLUCOPHAGE) 500 mg tablet Take 1 tablet by mouth twice daily 180 Tablet 3 dapagliflozin (Farxiga) 10 mg tab tablet Take 1 Tablet by mouth daily. 90 Tablet 1    fluticasone furoate (Arnuity Ellipta) 100 mcg/actuation dsdv inhaler Take 1 Puff by inhalation daily. 30 Each 5    insulin lispro (HUMALOG) 100 unit/mL kwikpen INJECT 5 UNITS SUBCUTANEOUSLY WITH MEALS THREE TIMES DAILY (STOP HUMALOG 75/25) 15 mL 0    Insulin Needles, Disposable, (Saumya Pen Needle) 32 gauge x 5/32\" ndle Use to inject insulin 4 times daily 400 Pen Needle 1    rivaroxaban (XARELTO) 20 mg tab tablet Take 1 Tablet by mouth daily (with breakfast). 90 Tablet 3    OneTouch Verio test strips strip Use to test blood sugar 4 times daily 200 Strip 5    lancets (OneTouch Delica Plus Lancet) 33 gauge misc Use to test blood sugar 4 times daily 200 Each 5    ipratropium (ATROVENT HFA) 17 mcg/actuation inhaler Take 1 Puff by inhalation every six (6) hours as needed for Wheezing. albuterol (PROVENTIL HFA, VENTOLIN HFA, PROAIR HFA) 90 mcg/actuation inhaler Take 1-2 puffs every 4-6 hrs prn shortness of breath 1 Each 3    carvediloL (COREG) 25 mg tablet Take 1 Tablet by mouth two (2) times daily (with meals). 180 Tablet 3    furosemide (LASIX) 40 mg tablet Take 1 Tab by mouth daily. 90 Tab 3    [DISCONTINUED] Trulicity 3 ZY/7.8 mL pnij INJECT 1 SYRINGE (3MG) SUBCUTANEOUSLY ONCE A WEEK 12 Each 1    [DISCONTINUED] hydrALAZINE (APRESOLINE) 50 mg tablet Take 1 Tablet by mouth three (3) times daily. 270 Tablet 0    [DISCONTINUED] insulin glargine (LANTUS,BASAGLAR) 100 unit/mL (3 mL) inpn 55 Units by SubCUTAneous route nightly. No current facility-administered medications on file prior to visit.         Allergies   Allergen Reactions    Shellfish Derived Hives       Objective:  Visit Vitals  /76 (BP 1 Location: Right arm, BP Patient Position: Sitting, BP Cuff Size: Adult) Comment (BP Patient Position): feet flat on floor   Pulse 81   Temp 98.3 °F (36.8 °C) (Temporal)   Resp 20   Ht 6' 1\" (1.854 m)   Wt 307 lb (139.3 kg) SpO2 97%   BMI 40.50 kg/m²    Body mass index is 40.5 kg/m². Physical assessment  Physical Exam  Vitals and nursing note reviewed. Eyes:      Conjunctiva/sclera: Conjunctivae normal.      Pupils: Pupils are equal, round, and reactive to light. Cardiovascular:      Rate and Rhythm: Normal rate and regular rhythm. Heart sounds: Normal heart sounds. No murmur heard. No friction rub. No gallop. Pulmonary:      Effort: Pulmonary effort is normal.      Breath sounds: Normal breath sounds. Musculoskeletal:         General: Normal range of motion. Cervical back: Normal range of motion. Skin:     General: Skin is warm and dry. Neurological:      Mental Status: He is alert. Labwork and Ancillary Studies:    CBC w/Diff  Lab Results   Component Value Date/Time    WBC 5.7 02/12/2021 12:14 PM    HGB 14.4 02/12/2021 12:14 PM    PLATELET 466 67/52/4629 12:14 PM         Basic Metabolic Profile  Lab Results   Component Value Date/Time    Sodium 142 07/19/2022 11:03 AM    Potassium 4.5 07/19/2022 11:03 AM    Chloride 107 (H) 07/19/2022 11:03 AM    CO2 22 07/19/2022 11:03 AM    Anion gap 6 02/12/2021 12:14 PM    Glucose 97 07/19/2022 11:03 AM    BUN 13 07/19/2022 11:03 AM    Creatinine 1.02 07/19/2022 11:03 AM    BUN/Creatinine ratio 13 07/19/2022 11:03 AM    GFR est AA 87 11/29/2021 12:00 AM    GFR est non-AA 75 11/29/2021 12:00 AM    Calcium 9.3 07/19/2022 11:03 AM        Cholesterol  Lab Results   Component Value Date/Time    Cholesterol, total 137 07/19/2022 11:03 AM    HDL Cholesterol 38 (L) 07/19/2022 11:03 AM    LDL, calculated 79 07/19/2022 11:03 AM    Triglyceride 111 07/19/2022 11:03 AM           I have discussed the diagnosis with the patient and the intended plan as seen in the above orders. The patient has received an After-Visit Summary and questions were answered concerning future plans. An After Visit Summary was printed and given to the patient.     All diagnosis have been discussed with the patient and all of the patient's questions have been answered. Follow-up and Dispositions    Return for ASAP, appt w/ helen, AND, 4month, DM, DM foot, HLD, HTN, 30 min, office only, w/labs prior. Judy Lowe, [facility]  [address]  [id number]

## 2022-07-26 NOTE — PATIENT INSTRUCTIONS
1. Between Shoulder Blades  shoudler blade ball  Instructions:    Position your body over the massage ball as to target the muscles between the shoulder blades. Apply an appropriate amount of pressure onto the ball. Slowly [place] around the target area and pause at any areas that illicit more tenderness. Once you find a tender spot, move you arm up/down to increase the release. Repeat on other side. 2. Chest  ball chest  Instructions:    Position your body over a massage ball so that your chest muscles are targeted. Apply appropriate amount of pressure onto the ball. Slowly [place] around the target area and pause at any areas that illicit more tenderness. Once you have found a painful spot, move your arm around to increase the amount of release in the area. Repeat on other side. 3. Latissimus Dorsi  latfoam  Instructions:    Place a foam roller on the floor. Position your body on top of the foam roller so that it is in direct contact with the latissimus dorsi. (see above)  Apply an appropriate amount of pressure onto the foam roller. Slowly move around the target area and pause at any areas that illicit more tenderness. You may want to move your arm around to increase the release. Repeat on other side. 4. Upper Abdominals  ball upper abs    Instructions:    Position your body over a massage ball as to target the upper abdominal region. Apply a small amount of pressure onto the ball. Slowly [place] around the target area and pause at any areas that illicit more tenderness. Note: Please take care with this release. Excessive pressure in this region can compress your organs! Repeat on other side. 5. Intercostals  Instructions: With your finger, feel the gaps between your ribs. Starting from the outer side of your ribs, trace this gap towards the mid line. Apply appropriate amount of pressure through your finger tip.   Once you have found a tender area, maintain the finger tip pressure and take 3x breaths in/out. Make sure to cover as many ribs as you can locate. B) Stretches  Now that you have released your muscles that influence the thoracic spine, the next step is to stretch! Guideline:    Hold each stretch for a minimum of 30-45 seconds. Repeat 2-3 times. Make sure you feel the stretch in the targeted areas. 6. Side Stretch  Side stretch  This stretch predominantly targets the latissimus dorsi muscle. Instructions: Whilst standing, reach over and bend to the side. Aim to feel the stretch on the side of your body. Do not let your torso rotate. Repeat on the other side. 7. Front Stretch  Chest stretch    This stretch predominantly targets the pectoralis (chest) muscle. Instructions:    Place your outstretched hand on a door frame. Lunge forward. Pull your shoulder blades backwards. Do not arch your lower back. (Keep rib cage low.)  Aim to feel a stretch at the front of your chest.  Repeat on the other side. 8. Intercostals  These muscles are situated between the ribs. (Surprisingly, they can actually get pretty tight!)    intercostal stretch   I call this my sexy pose :)    Instructions:    Lie on your side whilst leaning on your elbow  [place] your body to the upper side. Aim to feel a stretch at the side of your rib cage. Aim to take deep breaths into the area of stretch. Repeat on the other side. 9. Posterior Line Stretch  Posterior line stretch    Instructions: Whilst sitting down, pull your head down and bring your chin closer to your upper chest.  Bend as far forward as possible whilst making sure to round the upper back. Aim to feel a stretch at the back between the shoulder blades. Take deep breaths into the area of stretch. C) Mobility  If you have completed the releases and stretches, your thoracic spine should feel much looser now.  Lets get moving! Note:    You may feel/hear clicks when you perform these mobility exercises.   (This is just the release of pressure in your joints.)  Aim to move your spine as much as possible without compensating with other joints. 10. Extension  Thoracic extension    Instructions:    Place a foam roller (a rolled up towel will work too) on the floor. Lie down on the ground and position the foam roll so that it is in the middle of your upper back. Stretch arms over head and arch backwards. Keep your lower ribs down to prevent over arching of the lower back. Oscillate this motion for 30 repetitions. 11. Flexion  flat round back  Instructions:    Get into the 4 point kneel position. Proceed to round your upper back. Aim to feel a gentle stretch at the back as your elongate your spine. Return to the starting position. Alternate between these 2 positions for 30 repetitions. Progression: Try to round your upper back one vertebra at a time. (aka Segmental control)  12. Rotation  rotation  Instructions:    Sit down on a chair. Place your hand on the outer side of the opposite knee  With the other hand, grab onto the back of the chair. Rotate your spine. (as to look behind you)  Oscillate in this position for 30 repetitions. Repeat on the other side. 13. Translations  thoracic translation  Instructions: Whilst sitting, slide your shoulders to the side. Aim to keep your shoulders level whilst performing this movement. Alternate sides for 30 repetitions. D) Strengthening  14. Wall Raf  raf 1 raf 2  Instructions:    Stand with your back to a wall. Keep your back and arms pulled backwards as to remain in contact with the wall throughout movements. Place your arms in the W starting position. Transition to I position. Aim to feel the contraction in the muscles between the shoulder blade. Repeat 10-20 times. 15. Parallel Raf  horizontal retraction  Instructions:    Support your chest on a stool. (as to keep your body parallel with the floor)  Place your arms in the W starting position.   Transition to I position. Keep your back muscles pulled backwards throughout all movements. Repeat 10-20 times. 16. Side Bends  Instructions: Whilst standing, hold onto weights in your hands. Use an appropriate weight. Proceed to bend all the way to the side. Make sure that you do not twist your body. Hold for 5 seconds. Alternate sides. Repeat 20 times. Note: Increase the weight when you are ready to progress the exercise. 17. Rotation In 4 Point Kneel  Instructions:    Get into the 4 point kneel position. Place one hand behind your head. Proceed to twist your body. Aim to get the elbow pointing towards the roof. Gently brace your abdominal muscles. Keep your ribs cage low. Do not flare you ribs out. Hold for 5 seconds. Repeat 20 times. Repeat exercise on the other side.

## 2022-08-03 DIAGNOSIS — E11.9 CONTROLLED TYPE 2 DIABETES MELLITUS WITHOUT COMPLICATION, WITHOUT LONG-TERM CURRENT USE OF INSULIN (HCC): ICD-10-CM

## 2022-08-03 RX ORDER — INSULIN LISPRO 100 [IU]/ML
INJECTION, SOLUTION INTRAVENOUS; SUBCUTANEOUS
Qty: 15 ML | Refills: 0 | Status: SHIPPED | OUTPATIENT
Start: 2022-08-03 | End: 2022-09-29

## 2022-08-10 ENCOUNTER — OFFICE VISIT (OUTPATIENT)
Dept: FAMILY MEDICINE CLINIC | Age: 52
End: 2022-08-10

## 2022-08-10 DIAGNOSIS — Z79.4 CONTROLLED TYPE 2 DIABETES MELLITUS WITHOUT COMPLICATION, WITH LONG-TERM CURRENT USE OF INSULIN (HCC): Primary | ICD-10-CM

## 2022-08-10 DIAGNOSIS — E11.9 CONTROLLED TYPE 2 DIABETES MELLITUS WITHOUT COMPLICATION, WITH LONG-TERM CURRENT USE OF INSULIN (HCC): Primary | ICD-10-CM

## 2022-08-10 NOTE — PATIENT INSTRUCTIONS
Your Visit Summary:     Plan:  - Increase Lantus to 58 units daily  - Take 4 units of Humalog when blood sugar is less than 150 prior to meals. Continue taking 5 units of Humalog if blood sugar is above 150.  - Continue Trulicity 3 mg once weekly      Call me with any questions or concerns  Milad Corbett [phone]      Check and document your blood sugar twice daily  Bring your meter/log to all future visits. Your blood sugar goals:  - Fasting (first thing in the morning)  blood sugar: 80 - 130   - 1 to 2 hours after a meal: less than 180     If you experience symptoms of low blood sugar (example: less than 70):  - Confirm low reading by checking your blood sugar.   - Then treat with 15 grams of carbohydrates (one-half cup of juice or regular soda, or 4-5 glucose tablets). - Wait 15 minutes to recheck blood sugar.   - Then eat a protein containing meal/snack to prevent another low blood sugar episode. (example: peanut butter + crackers)    Other recommendations:  - Schedule an annual eye exam.  - Check your feet daily for any signs of open wounds, cuts, or sores. - Given your risk factors, the following vaccines are recommended: annual flu shot and pneumococcal vaccine (Pneumovax)      In addition to taking your medications as directed, improving your blood sugar involves modifying your nutrition and maximizing the amount of physical activity. Nutrition:  - When reviewing a nutrition label, focus on the serving size, total calories, fat (and type of fats), total carbohydrates, sugar (and amount of added sugar), amount of fiber (good for your digestive), and amount of protein. Refer to your nutrition label guide for more information.  - For a meal : max 45 - 60 grams of carbohydrates  - For a snack: max 15 grams of carbohydrates  - Reduce amount of saturated and trans fat. Consider more unsaturated fat options as they are better for your heart health.    - Have at least 1 serving of lean protein with each meal.    - Increasing fiber helps you feel full after eating and have regular bowel movements. It can also help reduce your cholesterol.   - Substitute fruit juices for the whole fruit. Low carb snack ideas (15 grams total carb or less):  String cheese or babybel with 6 crackers  4 peanut butter crackers  3 cups of popcorn  1 cup raw vegetables with hummus or ranch dip (just need to watch how much dip you use)  Nuts  2 rice cakes  Celery with peanut butter or cream cheese  String cheese with 1 serving of fruit  Greek yogurt (look at label to make sure < 15 gram carb)  Plain greek yogurt with fresh berries added  [place] protein bar  Whisps parmesan cheese crisps  Hard boiled egg  Cottage cheese  Tuna salad lettuce roll-ups  Deli meat roll-ups with slice of cheese  Sugar Free Jello  Glucerna shake (16 grams)   Glucerna hunger smart shake (16 grams)  Ensure protein max shake  Fruit (1 serving/15 grams)  1/2 banana, marguerite, or grapefruit   1/3 melon (small cantaloupe)  1 slice or 1 cup of honeydew melon  1 slice or 1 and 1/4 cups of watermelon   1 small apple, peach, orange or pear  2 small tangerines  1 cup of raspberries  3/4 cup of blackberries, blueberries or pineapples  1/2 cup of fruit juice, pears, applesauce, or mangos  17 small grapes  12 cherries    Be careful with the glucerna products as they differ in the total carbs depending on the product (some are intended as meal replacements not snacks). Make sure you look at the total carbs on the label as products can differ. Physical Activity:  - Aim for 30 minutes of consistent, moderately intensive, physical activity a day for 5 days or an average of 150 minutes per week. - Start slow, increase as tolerated. For example: Walk every day, working up to 30 minutes of brisk walking, 5 days a week--or split the 30 minutes into two 15-minute or three 10-minute walks. - If you sit for a long time, get up and move/stretch every 90 minutes.

## 2022-08-10 NOTE — PROGRESS NOTES
Pharmacy Progress Note - Diabetes Management    S/O: Mr. Iran Primrose is a 46 y.o. male referred by Megan Braga NP who was seen today for diabetes management follow up. Patient's last A1c was 6.9% in July 2022. Interim update:  Patient reports his blood sugars are still mostly at goal, but he has had some highs and some lows. Reports lowest reading was 69, highest was 277. His fasting blood sugar this morning in office was 155. Patient states he is wondering if Trulicity needs increased; however, he just received a 3 month supply from the pharmacy. Patient reports that he still eats 2 meals per day and takes 5 units of Humalog before each meal. States the lows that have occurred happened  after Humalog. Patient reports he needs to call cardiologist to follow up on chest pain/pressure he has when walking which has been occurring for months. Patient states he has stopped taking his furosemide for the past 4 days because he was tired of getting up all night to urinate. States he takes furosemide in the morning but it keeps him in the bathroom all day and night. Current anti-hyperglycemic regimen includes:    Key Antihyperglycemic Medications               insulin lispro (HUMALOG) 100 unit/mL kwikpen INJECT 5 UNITS SUBCUTANEOUSLY WITH MEALS THREE TIMES DAILY (STOP HUMALOG 75/25)    insulin glargine (LANTUS,BASAGLAR) 100 unit/mL (3 mL) inpn 55 Units by SubCUTAneous route nightly. dulaglutide (Trulicity) 3 IS/1.4 mL pnij 3 mg by SubCUTAneous route every seven (7) days. metFORMIN (GLUCOPHAGE) 500 mg tablet Take 1 tablet by mouth twice daily    dapagliflozin (Farxiga) 10 mg tab tablet Take 1 Tablet by mouth daily.             Past Medical History:   Diagnosis Date    A-fib ([facility]     Asthma     Cardiomyopathy ([facility]     40% (2011), 60%(02/14)    Chronic obstructive pulmonary disease (HCC)     Diabetes ([facility]     Dyslipidemia     Fracture     right arm    GERD (gastroesophageal reflux disease)     Hx of cardiac cath 02/2021    Hypercholesterolemia     Hypertension     Sleep apnea     no cpap      Allergies   Allergen Reactions    Shellfish Derived Hives       Current Outpatient Medications   Medication Sig    insulin lispro (HUMALOG) 100 unit/mL kwikpen INJECT 5 UNITS SUBCUTANEOUSLY WITH MEALS THREE TIMES DAILY (STOP HUMALOG 75/25)    hydrALAZINE (APRESOLINE) 50 mg tablet Take 1 Tablet by mouth three (3) times daily. insulin glargine (LANTUS,BASAGLAR) 100 unit/mL (3 mL) inpn 55 Units by SubCUTAneous route nightly. dulaglutide (Trulicity) 3 ZE/5.9 mL pnij 3 mg by SubCUTAneous route every seven (7) days. losartan (COZAAR) 100 mg tablet Take 1 tablet by mouth once daily    amLODIPine (NORVASC) 10 mg tablet Take 1 tablet by mouth once daily    spironolactone (ALDACTONE) 50 mg tablet Take 1 tablet by mouth once daily    atorvastatin (LIPITOR) 40 mg tablet Take 1 tablet by mouth nightly    isosorbide mononitrate ER (IMDUR) 30 mg tablet TAKE 1 TABLET BY MOUTH ONCE DAILY IN THE MORNING    metFORMIN (GLUCOPHAGE) 500 mg tablet Take 1 tablet by mouth twice daily    dapagliflozin (Farxiga) 10 mg tab tablet Take 1 Tablet by mouth daily. fluticasone furoate (Arnuity Ellipta) 100 mcg/actuation dsdv inhaler Take 1 Puff by inhalation daily. Insulin Needles, Disposable, (Saumya Pen Needle) 32 gauge x 5/32\" ndle Use to inject insulin 4 times daily    rivaroxaban (XARELTO) 20 mg tab tablet Take 1 Tablet by mouth daily (with breakfast). OneTouch Verio test strips strip Use to test blood sugar 4 times daily    lancets (OneTouch Delica Plus Lancet) 33 gauge misc Use to test blood sugar 4 times daily    ipratropium (ATROVENT HFA) 17 mcg/actuation inhaler Take 1 Puff by inhalation every six (6) hours as needed for Wheezing.     albuterol (PROVENTIL HFA, VENTOLIN HFA, PROAIR HFA) 90 mcg/actuation inhaler Take 1-2 puffs every 4-6 hrs prn shortness of breath    carvediloL (COREG) 25 mg tablet Take 1 Tablet by mouth two (2) times daily (with meals). furosemide (LASIX) 40 mg tablet Take 1 Tab by mouth daily. No current facility-administered medications for this visit. Labs/Vitals: Wt Readings from Last 3 Encounters:   07/26/22 307 lb (139.3 kg)   04/26/22 309 lb (140.2 kg)   12/16/21 315 lb (142.9 kg)     BP Readings from Last 3 Encounters:   07/26/22 127/76   04/26/22 136/76   12/16/21 102/67       Lab Results   Component Value Date/Time    Sodium 142 07/19/2022 11:03 AM    Potassium 4.5 07/19/2022 11:03 AM    Chloride 107 (H) 07/19/2022 11:03 AM    CO2 22 07/19/2022 11:03 AM    Anion gap 6 02/12/2021 12:14 PM    Glucose 97 07/19/2022 11:03 AM    BUN 13 07/19/2022 11:03 AM    Creatinine 1.02 07/19/2022 11:03 AM    BUN/Creatinine ratio 13 07/19/2022 11:03 AM    GFR est AA 87 11/29/2021 12:00 AM    GFR est non-AA 75 11/29/2021 12:00 AM    Calcium 9.3 07/19/2022 11:03 AM    Bilirubin, total 0.3 07/19/2022 11:03 AM    Alk. phosphatase 111 07/19/2022 11:03 AM    Protein, total 6.9 07/19/2022 11:03 AM    Albumin 4.3 07/19/2022 11:03 AM    Globulin 3.6 02/12/2021 12:14 PM    A-G Ratio 1.7 07/19/2022 11:03 AM    ALT (SGPT) 24 07/19/2022 11:03 AM       Lab Results   Component Value Date/Time    Cholesterol, total 137 07/19/2022 11:03 AM    HDL Cholesterol 38 (L) 07/19/2022 11:03 AM    LDL, calculated 79 07/19/2022 11:03 AM    VLDL, calculated 20 07/19/2022 11:03 AM    Triglyceride 111 07/19/2022 11:03 AM       HbA1c:  Lab Results   Component Value Date/Time    Hemoglobin A1c 6.9 (H) 07/19/2022 11:03 AM    Hemoglobin A1c, External 14.0 08/20/2021 12:00 AM     No components found for: 2     CrCl cannot be calculated (Unknown ideal weight. ). A/P:    Diabetes Management:  - Per ADA guidelines, Pt's A1c is at goal of < 7%. - Current SMBG trend shows patient having variable blood sugars. Occasional lows occurring after Humalog injections. Fasting blood glucose elevated > 130 often. - Increase Lantus to 58 units daily.   - Change Humalog to 4 units prior to meals if pre-meal BG < 150, 5 units if pre-meal BG >150. Do not give Humalog if pre-meal glucose < 70. Educated patient on appropriate timing of insulin administration.  - Continue Trulicity at 3 mg weekly for now. Can consider increasing to 4.5 mg weekly if not meeting glycemic goals. - Recommend metformin and Farxiga at current doses. - Advised patient to follow up with cardiologist asap to discuss chest discomfort and concerns with diuretic. Educated patient on risks of stopping diuretic without speaking to doctor first.  - Patient to follow with PCP for continued diabetes management, return to PharmD as needed. Patient verbalized understanding of the information presented and all of the patients questions were answered. AVS was handed to the patient. Patient advised to call the office with any additional questions or concerns. Notifications of recommendations will be sent to Deniz Jones NP for review.     Thank you for the consult,  Vladimir Crump. 41 in place: Yes  Recommendation Provided To: Patient/Caregiver: 3 via In person  Intervention Detail: Adherence Monitorin and Dose Adjustment: 2, reason: KAYDEN and Therapy Optimization  Intervention Accepted By: Patient/Caregiver: 3  Time Spent (min): 45

## 2022-09-02 ENCOUNTER — PATIENT MESSAGE (OUTPATIENT)
Dept: FAMILY MEDICINE CLINIC | Age: 52
End: 2022-09-02

## 2022-09-23 DIAGNOSIS — I10 ESSENTIAL HYPERTENSION: ICD-10-CM

## 2022-09-23 NOTE — TELEPHONE ENCOUNTER
Requested Prescriptions     Pending Prescriptions Disp Refills    carvediloL (COREG) 25 mg tablet 180 Tablet 3     Sig: Take 1 Tablet by mouth two (2) times daily (with meals).      Patient scheduled on 10/11

## 2022-09-26 RX ORDER — CARVEDILOL 25 MG/1
25 TABLET ORAL 2 TIMES DAILY WITH MEALS
Qty: 180 TABLET | Refills: 1 | Status: SHIPPED | OUTPATIENT
Start: 2022-09-26

## 2022-09-26 RX ORDER — ISOSORBIDE MONONITRATE 30 MG/1
30 TABLET, EXTENDED RELEASE ORAL DAILY
Qty: 90 TABLET | Refills: 1 | Status: SHIPPED | OUTPATIENT
Start: 2022-09-26

## 2022-09-26 NOTE — TELEPHONE ENCOUNTER
PCP: Cynthia Urena NP    Last appt: 4/12/2022  Future Appointments   Date Time Provider Kelvin Arredondo   10/11/2022  2:30 PM Vickie George MD [facility] BS AMB   11/21/2022  9:30 AM AMA LAB AMA BS AMB   11/28/2022  1:30 PM Cynthia Urena NP AMA BS AMB       Requested Prescriptions     Pending Prescriptions Disp Refills    carvediloL (COREG) 25 mg tablet 180 Tablet 1     Sig: Take 1 Tablet by mouth two (2) times daily (with meals). isosorbide mononitrate ER (IMDUR) 30 mg tablet 90 Tablet 1     Sig: Take 1 Tablet by mouth daily.  in the morning

## 2022-09-29 DIAGNOSIS — E11.9 CONTROLLED TYPE 2 DIABETES MELLITUS WITHOUT COMPLICATION, WITHOUT LONG-TERM CURRENT USE OF INSULIN (HCC): ICD-10-CM

## 2022-09-29 RX ORDER — INSULIN LISPRO 100 [IU]/ML
INJECTION, SOLUTION INTRAVENOUS; SUBCUTANEOUS
Qty: 15 ML | Refills: 0 | Status: SHIPPED | OUTPATIENT
Start: 2022-09-29

## 2022-10-18 ENCOUNTER — OFFICE VISIT (OUTPATIENT)
Dept: CARDIOLOGY CLINIC | Age: 52
End: 2022-10-18
Payer: MEDICAID

## 2022-10-18 VITALS
WEIGHT: 313.2 LBS | OXYGEN SATURATION: 97 % | SYSTOLIC BLOOD PRESSURE: 138 MMHG | DIASTOLIC BLOOD PRESSURE: 72 MMHG | BODY MASS INDEX: 41.32 KG/M2 | TEMPERATURE: 97.4 F | HEART RATE: 87 BPM

## 2022-10-18 DIAGNOSIS — R06.00 DYSPNEA, UNSPECIFIED TYPE: ICD-10-CM

## 2022-10-18 DIAGNOSIS — E78.00 PURE HYPERCHOLESTEROLEMIA: ICD-10-CM

## 2022-10-18 DIAGNOSIS — I10 ESSENTIAL HYPERTENSION WITH GOAL BLOOD PRESSURE LESS THAN 140/90: ICD-10-CM

## 2022-10-18 DIAGNOSIS — I10 ESSENTIAL HYPERTENSION: Primary | ICD-10-CM

## 2022-10-18 DIAGNOSIS — I42.0 DILATED CARDIOMYOPATHY (HCC): ICD-10-CM

## 2022-10-18 PROCEDURE — 93000 ELECTROCARDIOGRAM COMPLETE: CPT | Performed by: INTERNAL MEDICINE

## 2022-10-18 PROCEDURE — 99214 OFFICE O/P EST MOD 30 MIN: CPT | Performed by: INTERNAL MEDICINE

## 2022-10-18 NOTE — LETTER
10/18/2022    Patient: Fadi Mattson   YOB: 1970   Date of Visit: 10/18/2022     You Kirk NP  [address] [facility] [address]  [facility] [address]  [place] [address] [facility] [address]    Dear You Kirk NP,      Thank you for referring Mr. Fadi Mattson to [facility] for evaluation. My notes for this consultation are attached. If you have questions, please do not hesitate to call me. I look forward to following your patient along with you.       Sincerely,    Giuliana Appiah MD
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
## 2022-10-18 NOTE — PROGRESS NOTES
Identified pt with two pt identifiers(name and ). Reviewed record in preparation for visit and have obtained necessary documentation. Elijah Morley presents today for   Chief Complaint   Patient presents with    Follow-up     C/o chest pain        Pt c/o chest pain             Elijah Morley preferred language for health care discussion is english/other. Personal Protective Equipment:   Personal Protective Equipment was used including: mask-surgical and hands-gloves. Patient was placed on no precaution(s). Patient was masked. Precautions:   Patient currently on None  Patient currently roomed with door closed. Is someone accompanying this pt? no    Is the patient using any DME equipment during [address]? Yes. Cane     Depression Screening:  3 most recent PHQ Screens 10/18/2022   Little interest or pleasure in doing things Not at all   Feeling down, depressed, irritable, or hopeless Not at all   Total Score PHQ 2 0       Learning Assessment:  Learning Assessment 2020   PRIMARY LEARNER Patient   HIGHEST LEVEL OF EDUCATION - PRIMARY LEARNER  2 YEARS [address] LEARNER NONE   CO-LEARNER CAREGIVER No   PRIMARY LANGUAGE ENGLISH   LEARNER PREFERENCE PRIMARY DEMONSTRATION     -   ANSWERED BY Gloria Zarate   RELATIONSHIP SELF       Abuse Screening:  Abuse Screening Questionnaire 2020   Do you ever feel afraid of your partner? N   Are you in a relationship with someone who physically or mentally threatens you? N   Is it safe for you to go home? Y       Fall Risk  Fall Risk Assessment, last 12 mths 2020   Able to walk? Yes   Fall in past 12 months? No       Pt currently taking Anticoagulant therapy? Yes   Pt currently taking Antiplatelet therapy? No     Coordination of Care:  1. Have you been to the ER, urgent care clinic since your last visit? Hospitalized since your last visit? No     2.  Have you seen or consulted any other health care providers outside of the [address] [place] [address] since your last visit? Include any pap smears or colon screening. no      Please see Red banners under Allergies and Med Rec to remove outside inquires. All correct information has been verified with patient and added to chart.      Medication's patient's would liked removed has been marked not taking to be removed per Verbal order and read back per Yariel Yee MD

## 2022-10-26 DIAGNOSIS — E11.9 CONTROLLED TYPE 2 DIABETES MELLITUS WITHOUT COMPLICATION, WITHOUT LONG-TERM CURRENT USE OF INSULIN (HCC): ICD-10-CM

## 2022-10-31 RX ORDER — BLOOD SUGAR DIAGNOSTIC
STRIP MISCELLANEOUS
Qty: 100 STRIP | Refills: 0 | Status: SHIPPED | OUTPATIENT
Start: 2022-10-31

## 2022-10-31 RX ORDER — LANCETS 33 GAUGE
EACH MISCELLANEOUS
Qty: 100 EACH | Refills: 0 | Status: SHIPPED | OUTPATIENT
Start: 2022-10-31

## 2022-11-02 DIAGNOSIS — I48.91 ATRIAL FIBRILLATION, UNSPECIFIED TYPE (HCC): ICD-10-CM

## 2022-11-05 RX ORDER — RIVAROXABAN 20 MG/1
TABLET, FILM COATED ORAL
Qty: 90 TABLET | Refills: 0 | Status: SHIPPED | OUTPATIENT
Start: 2022-11-05

## 2022-11-16 DIAGNOSIS — I50.9 CHRONIC CONGESTIVE HEART FAILURE, UNSPECIFIED HEART FAILURE TYPE (HCC): ICD-10-CM

## 2022-11-16 DIAGNOSIS — E11.9 CONTROLLED TYPE 2 DIABETES MELLITUS WITHOUT COMPLICATION, WITHOUT LONG-TERM CURRENT USE OF INSULIN (HCC): ICD-10-CM

## 2022-11-25 DIAGNOSIS — E11.9 CONTROLLED TYPE 2 DIABETES MELLITUS WITHOUT COMPLICATION, WITHOUT LONG-TERM CURRENT USE OF INSULIN (HCC): ICD-10-CM

## 2022-11-25 DIAGNOSIS — E78.5 DYSLIPIDEMIA: ICD-10-CM

## 2022-11-25 RX ORDER — ATORVASTATIN CALCIUM 40 MG/1
40 TABLET, FILM COATED ORAL
Qty: 90 TABLET | Refills: 3 | Status: SHIPPED | OUTPATIENT
Start: 2022-11-25

## 2022-11-28 ENCOUNTER — OFFICE VISIT (OUTPATIENT)
Dept: FAMILY MEDICINE CLINIC | Age: 52
End: 2022-11-28
Payer: MEDICAID

## 2022-11-28 VITALS
TEMPERATURE: 98.6 F | RESPIRATION RATE: 20 BRPM | BODY MASS INDEX: 41.75 KG/M2 | SYSTOLIC BLOOD PRESSURE: 124 MMHG | HEART RATE: 64 BPM | HEIGHT: 73 IN | OXYGEN SATURATION: 96 % | WEIGHT: 315 LBS | DIASTOLIC BLOOD PRESSURE: 74 MMHG

## 2022-11-28 DIAGNOSIS — I10 PRIMARY HYPERTENSION: ICD-10-CM

## 2022-11-28 DIAGNOSIS — I27.20 PULMONARY HYPERTENSION (HCC): ICD-10-CM

## 2022-11-28 DIAGNOSIS — I42.0 DILATED CARDIOMYOPATHY (HCC): ICD-10-CM

## 2022-11-28 DIAGNOSIS — E78.00 HYPERCHOLESTEROLEMIA: ICD-10-CM

## 2022-11-28 DIAGNOSIS — I50.42 CHRONIC COMBINED SYSTOLIC AND DIASTOLIC CONGESTIVE HEART FAILURE (HCC): ICD-10-CM

## 2022-11-28 DIAGNOSIS — E11.9 CONTROLLED TYPE 2 DIABETES MELLITUS WITHOUT COMPLICATION, WITHOUT LONG-TERM CURRENT USE OF INSULIN (HCC): Primary | ICD-10-CM

## 2022-11-28 DIAGNOSIS — I48.0 PAROXYSMAL ATRIAL FIBRILLATION (HCC): ICD-10-CM

## 2022-11-28 DIAGNOSIS — I50.9 CHRONIC CONGESTIVE HEART FAILURE, UNSPECIFIED HEART FAILURE TYPE (HCC): ICD-10-CM

## 2022-11-28 DIAGNOSIS — J44.9 CHRONIC OBSTRUCTIVE PULMONARY DISEASE, UNSPECIFIED COPD TYPE (HCC): ICD-10-CM

## 2022-11-28 PROCEDURE — 3044F HG A1C LEVEL LT 7.0%: CPT | Performed by: NURSE PRACTITIONER

## 2022-11-28 PROCEDURE — 3074F SYST BP LT 130 MM HG: CPT | Performed by: NURSE PRACTITIONER

## 2022-11-28 PROCEDURE — 99214 OFFICE O/P EST MOD 30 MIN: CPT | Performed by: NURSE PRACTITIONER

## 2022-11-28 PROCEDURE — 3078F DIAST BP <80 MM HG: CPT | Performed by: NURSE PRACTITIONER

## 2022-11-28 NOTE — PROGRESS NOTES
[address] [place] [address]               [phone]      Chely Cheung is a 46 y.o. male and presents with Follow Up Chronic Condition, Cholesterol Problem, Hypertension, and Diabetes       Assessment/Plan:    Diagnoses and all orders for this visit:    1. Controlled type 2 diabetes mellitus without complication, without long-term current use of insulin (HCC)  -     HEMOGLOBIN A1C WITH EAG; Future  -     MICROALBUMIN, UR, RAND W/ MICROALB/CREAT RATIO; Future  Endorses medication compliance, Follow up labs prior to next visit, Denies signs and symptoms of hyper or hypoglycemia, and Diabetes controlled, A1C <7    2. Hypercholesterolemia  -     LIPID PANEL; Future  Endorses medication compliance, Follow up labs prior to next visit, and Denies abdominal pain or symptoms of myalgia     3. Primary hypertension  -     METABOLIC PANEL, COMPREHENSIVE; Future  Endorses medication compliance, Follow up labs prior to next visit, and Blood pressure stable, continue amlodipine, imdur, losartan and hydralazine at same dose      4. Dilated cardiomyopathy ([facility]  Assessment & Plan:   monitored by specialist. No acute findings meriting change in the plan      5. Chronic combined systolic and diastolic congestive heart failure ([facility]  Assessment & Plan:   monitored by specialist. No acute findings meriting change in the plan      6. Chronic congestive heart failure, unspecified heart failure type [facility])  Assessment & Plan:   monitored by specialist. No acute findings meriting change in the plan      7. Chronic obstructive pulmonary disease, unspecified COPD type ([facility]  Assessment & Plan:   monitored by specialist. No acute findings meriting change in the plan      8. Paroxysmal atrial fibrillation (HCC)  Assessment & Plan:   monitored by specialist. No acute findings meriting change in the plan      9.  Pulmonary hypertension ([facility]  Assessment & Plan:   monitored by specialist. No acute findings meriting change in the plan        Follow-up and Dispositions    Return in about 4 months (around 3/28/2023) for DM, DM foot, HLD, HTN, 30 min, office only, w/labs prior. Health Maintenance:   Health Maintenance   Topic Date Due    COVID-19 Vaccine (1) Never done    Foot Exam Q1  Never done    Eye Exam Retinal or Dilated  Never done    Hepatitis B Vaccine (1 of 3 - Risk 3-dose series) Never done    DTaP/Tdap/Td series (1 - Tdap) Never done    Shingrix Vaccine Age 50> (1 of 2) Never done    Flu Vaccine (1) 2022    MICROALBUMIN Q1  2023    Depression Screen  10/18/2023    A1C test (Diabetic or Prediabetic)  2023    Lipid Screen  2023    Colorectal Cancer Screening Combo  2025    Hepatitis C Screening  Completed    Pneumococcal 0-64 years  Completed        Subjective:    Labs obtained prior to visit? YES  Reviewed with patient? Yes    DMII-   Patient reports medication compliance Daily  Diabetic diet compliance most of the time  Patient monitors blood sugars regularly TID   Reports am fasting sugars range  AM: 102   afternoon: 100's evenin's   Denies hypoglycemic episodes yes  Denies polyuria, polydipsia, paraesthesia, vision changes? yes  Engaging in daily exercise?  Yes: Comment: walking around the apartment complex where he lives     Diabetic Foot and Eye Exam HM Status   Topic Date Due    Diabetic Foot Care  Never done    Eye Exam  Never done     Hemoglobin A1c   Date Value Ref Range Status   2022 6.9 (H) 4.8 - 5.6 % Final     Comment:              Prediabetes: 5.7 - 6.4           Diabetes: >6.4           Glycemic control for adults with diabetes: <7.0       Hemoglobin A1c, External   Date Value Ref Range Status   2021 14.0 % Final     Comment:     In Patient [facility]   ]  Creatinine, urine random   Date Value Ref Range Status   2022 172.9 Not Estab. mg/dL Final     Microalb/Creat ratio (ug/mg creat.)   Date Value Ref Range Status   2022 28 0 - 29 mg/g creat Final     Comment:                            Normal:                0 -  29                         Moderately increased: 30 - 300                         Severely increased:       >300     03/29/2022 97 (H) 0 - 29 mg/g creat Final     Comment:                            Normal:                0 -  29                         Moderately increased: 30 - 300                         Severely increased:       >300     11/29/2021 26 0 - 29 mg/g creat Final     Comment:                            Normal:                0 -  29                         Moderately increased: 30 - 300                         Severely increased:       >300       Key Antihyperglycemic Medications               dapagliflozin (Farxiga) 10 mg tab tablet (Taking) Take 1 Tablet by mouth daily. insulin lispro (HUMALOG) 100 unit/mL kwikpen (Taking) INJECT 5 UNITS WITH MEALS THREE TIMES DAILY (STOP HUMALOG 75/25)    insulin glargine (LANTUS,BASAGLAR) 100 unit/mL (3 mL) inpn (Taking) 55 Units by SubCUTAneous route nightly. dulaglutide (Trulicity) 3 NO/9.2 mL pnij (Taking) 3 mg by SubCUTAneous route every seven (7) days. metFORMIN (GLUCOPHAGE) 500 mg tablet (Taking) Take 1 tablet by mouth twice daily        Hypertension:  Visit Vitals  /74   Pulse 64   Temp 98.6 °F (37 °C) (Temporal)   Resp 20   Ht 6' 1\" (1.854 m)   Wt 321 lb (145.6 kg)   SpO2 96%   BMI 42.35 kg/m²      Patient reports taking medications as instructed. yes   Medication side effects noted. no  Headache upon wakening. no   Home BP monitoring:  160/70's  Do you experience chest pain/pressure or SOB with exertion? no  Maintain a low Sodium diet? yes  Key CAD CHF Meds               atorvastatin (LIPITOR) 40 mg tablet (Taking) Take 1 Tablet by mouth nightly. Xarelto 20 mg tab tablet (Taking) Take 1 tablet by mouth once daily with breakfast    carvediloL (COREG) 25 mg tablet (Taking) Take 1 Tablet by mouth two (2) times daily (with meals).     isosorbide mononitrate ER (IMDUR) 30 mg tablet (Taking) Take 1 Tablet by mouth daily. in the morning    hydrALAZINE (APRESOLINE) 50 mg tablet (Taking) Take 1 Tablet by mouth three (3) times daily. losartan (COZAAR) 100 mg tablet (Taking) Take 1 tablet by mouth once daily    amLODIPine (NORVASC) 10 mg tablet (Taking) Take 1 tablet by mouth once daily    spironolactone (ALDACTONE) 50 mg tablet (Taking) Take 1 tablet by mouth once daily    furosemide (LASIX) 40 mg tablet (Taking) Take 1 Tab by mouth daily. BUN   Date Value Ref Range Status   11/21/2022 15 6 - 24 mg/dL Final     Creatinine   Date Value Ref Range Status   11/21/2022 1.29 (H) 0.76 - 1.27 mg/dL Final     GFR est AA   Date Value Ref Range Status   11/29/2021 87 >59 mL/min/1.73 Final     Comment:     **In accordance with recommendations from the NKF-ASN Task force,**    [facility] eGFR calculation to the    2021 CKD-EPI creatinine equation that estimates kidney function    without a race variable. Potassium   Date Value Ref Range Status   11/21/2022 4.9 3.5 - 5.2 mmol/L Final         HLD:  Has been compliant with meds  Yes  Compliant with low-fat diet. most of the time    Denies myalgias or other side effects. yes  The 10-year ASCVD risk score (Axel ADRIAN, et al., 2019) is: 17.8%    Cholesterol, total   Date Value Ref Range Status   11/21/2022 150 100 - 199 mg/dL Final     Triglyceride   Date Value Ref Range Status   11/21/2022 124 0 - 149 mg/dL Final     HDL Cholesterol   Date Value Ref Range Status   11/21/2022 34 (L) >39 mg/dL Final     LDL, calculated   Date Value Ref Range Status   11/21/2022 94 0 - 99 mg/dL Final   ]  Key Antihyperlipidemia Meds               atorvastatin (LIPITOR) 40 mg tablet (Taking) Take 1 Tablet by mouth nightly. ROS:     ROS  As stated in HPI, otherwise all others negative. The problem list was updated as a part of today's visit.   Patient Active Problem List   Diagnosis Code Hypertension I10    Arrhythmia I49.9    Asthma J45.909    Chronic combined systolic and diastolic congestive heart failure (HCC) I50.42    Hypercholesterolemia E78.00    GERD (gastroesophageal reflux disease) K21.9    Chronic obstructive pulmonary disease (HCC) J44.9    Dyslipidemia E78.5    Paroxysmal atrial fibrillation (HCC) I48.0    Cardiomyopathy ([facility] I42.9    Controlled type 2 diabetes mellitus without complication, without long-term current use of insulin (HCC) E11.9    History of sleep apnea Z86.69    Hypertensive cardiomegaly I11.9    Mild persistent asthma without complication P90.34    Morbid obesity with body mass index (BMI) of 40.0 to 44.9 in adult ([facility] E66.01, Z68.41    Noncompliance with diet and medication regimen Z91.119, Z91.14    Pulmonary hypertension ([facility] I27.20    History of 2019 novel coronavirus disease (COVID-19) Z86.16    Chronic congestive heart failure (HCC) I50.9       The PSH, FH were reviewed. SH:  Social History     Tobacco Use    Smoking status: Former     Types: Cigars    Smokeless tobacco: Never    Tobacco comments:     black N mild    Substance Use Topics    Alcohol use: Yes     Alcohol/week: 0.0 standard drinks     Comment: Socially    Drug use: No       Medications/Allergies:  Current Outpatient Medications on File Prior to Visit   Medication Sig Dispense Refill    atorvastatin (LIPITOR) 40 mg tablet Take 1 Tablet by mouth nightly. 90 Tablet 3    dapagliflozin (Farxiga) 10 mg tab tablet Take 1 Tablet by mouth daily.  90 Tablet 1    Xarelto 20 mg tab tablet Take 1 tablet by mouth once daily with breakfast 90 Tablet 0    OneTouch Verio test strips strip USE 1 STRIP TO CHECK BLOOD SUGAR 4 TIMES DAILY 100 Strip 0    OneTouch Delica Plus Lancet 33 gauge misc USE 1 LANCET TO CHECK BLOOD SUGAR 4 TIMES DAILY 100 Each 0    insulin lispro (HUMALOG) 100 unit/mL kwikpen INJECT 5 UNITS WITH MEALS THREE TIMES DAILY (STOP HUMALOG 75/25) 15 mL 0    carvediloL (COREG) 25 mg tablet Take 1 Tablet by mouth two (2) times daily (with meals). 180 Tablet 1    isosorbide mononitrate ER (IMDUR) 30 mg tablet Take 1 Tablet by mouth daily. in the morning 90 Tablet 1    hydrALAZINE (APRESOLINE) 50 mg tablet Take 1 Tablet by mouth three (3) times daily. 270 Tablet 3    insulin glargine (LANTUS,BASAGLAR) 100 unit/mL (3 mL) inpn 55 Units by SubCUTAneous route nightly. 20 Adjustable Dose Pre-filled Pen Syringe 3    dulaglutide (Trulicity) 3 ZA/5.0 mL pnij 3 mg by SubCUTAneous route every seven (7) days. 12 Each 1    losartan (COZAAR) 100 mg tablet Take 1 tablet by mouth once daily 90 Tablet 0    amLODIPine (NORVASC) 10 mg tablet Take 1 tablet by mouth once daily 90 Tablet 0    spironolactone (ALDACTONE) 50 mg tablet Take 1 tablet by mouth once daily 90 Tablet 3    metFORMIN (GLUCOPHAGE) 500 mg tablet Take 1 tablet by mouth twice daily 180 Tablet 3    fluticasone furoate (Arnuity Ellipta) 100 mcg/actuation dsdv inhaler Take 1 Puff by inhalation daily. 30 Each 5    Insulin Needles, Disposable, (Saumya Pen Needle) 32 gauge x 5/32\" ndle Use to inject insulin 4 times daily 400 Pen Needle 1    ipratropium (ATROVENT HFA) 17 mcg/actuation inhaler Take 1 Puff by inhalation every six (6) hours as needed for Wheezing. albuterol (PROVENTIL HFA, VENTOLIN HFA, PROAIR HFA) 90 mcg/actuation inhaler Take 1-2 puffs every 4-6 hrs prn shortness of breath 1 Each 3    furosemide (LASIX) 40 mg tablet Take 1 Tab by mouth daily. 90 Tab 3     No current facility-administered medications on file prior to visit. Allergies   Allergen Reactions    Shellfish Derived Hives       Objective:  Visit Vitals  /74   Pulse 64   Temp 98.6 °F (37 °C) (Temporal)   Resp 20   Ht 6' 1\" (1.854 m)   Wt 321 lb (145.6 kg)   SpO2 96%   BMI 42.35 kg/m²    Body mass index is 42.35 kg/m². Physical assessment  Physical Exam  Vitals and nursing note reviewed.    Eyes:      Conjunctiva/sclera: Conjunctivae normal.      Pupils: Pupils are equal, round, and reactive to light. Cardiovascular:      Rate and Rhythm: Normal rate and regular rhythm. Heart sounds: Normal heart sounds. No murmur heard. No friction rub. No gallop. Pulmonary:      Effort: Pulmonary effort is normal.      Breath sounds: Normal breath sounds. Musculoskeletal:         General: Normal range of motion. Cervical back: Normal range of motion. Skin:     General: Skin is warm and dry. Neurological:      Mental Status: He is alert. Labwork and Ancillary Studies:    CBC w/Diff  Lab Results   Component Value Date/Time    WBC 5.7 02/12/2021 12:14 PM    HGB 14.4 02/12/2021 12:14 PM    PLATELET 592 72/93/1007 12:14 PM         Basic Metabolic Profile  Lab Results   Component Value Date/Time    Sodium 142 11/21/2022 09:44 AM    Potassium 4.9 11/21/2022 09:44 AM    Chloride 106 11/21/2022 09:44 AM    CO2 22 11/21/2022 09:44 AM    Anion gap 6 02/12/2021 12:14 PM    Glucose 146 (H) 11/21/2022 09:44 AM    BUN 15 11/21/2022 09:44 AM    Creatinine 1.29 (H) 11/21/2022 09:44 AM    BUN/Creatinine ratio 12 11/21/2022 09:44 AM    GFR est AA 87 11/29/2021 12:00 AM    GFR est non-AA 75 11/29/2021 12:00 AM    Calcium 9.6 11/21/2022 09:44 AM        Cholesterol  Lab Results   Component Value Date/Time    Cholesterol, total 150 11/21/2022 09:44 AM    HDL Cholesterol 34 (L) 11/21/2022 09:44 AM    LDL, calculated 94 11/21/2022 09:44 AM    Triglyceride 124 11/21/2022 09:44 AM           I have discussed the diagnosis with the patient and the intended plan as seen in the above orders. The patient has received an After-Visit Summary and questions were answered concerning future plans. An After Visit Summary was printed and given to the patient. All diagnosis have been discussed with the patient and all of the patient's questions have been answered. Follow-up and Dispositions    Return in about 4 months (around 3/28/2023) for DM, DM foot, HLD, HTN, 30 min, office only, w/labs prior. Boris Hogue, [facility]-BC  [address] [facility]   [address] [facility] [address]  [address]

## 2022-11-28 NOTE — PROGRESS NOTES
Room 9       When asked if patient has any concerns he would like to address with DAYO Linares patient states no . Did patient bring someone? No    Did the patient have DME equipment? No     Did you take your medication today? Yes       1. \"Have you been to the ER, urgent care clinic since your last visit? Hospitalized since your last visit? \" No    2. \"Have you seen or consulted any other health care providers outside of the [address] [place] [address] since your last visit? \" No     3. For patients aged 39-70: Has the patient had a colonoscopy / FIT/ Cologuard? Yes - no Care Gap present      If the patient is female:    4. For patients aged 41-77: Has the patient had a mammogram within the past 2 years? NA - based on age or sex      11. For patients aged 21-65: Has the patient had a pap smear?  NA - based on age or sex        3 most recent PHQ Screens 11/28/2022   Little interest or pleasure in doing things Not at all   Feeling down, depressed, irritable, or hopeless Not at all   Total Score PHQ 2 0         Health Maintenance Due   Topic Date Due    COVID-19 Vaccine (1) Never done    Foot Exam Q1  Never done    Eye Exam Retinal or Dilated  Never done    Hepatitis B Vaccine (1 of 3 - Risk 3-dose series) Never done    DTaP/Tdap/Td series (1 - Tdap) Never done    Shingrix Vaccine Age 50> (1 of 2) Never done    Flu Vaccine (1) 08/01/2022       Learning Assessment 11/2/2020   PRIMARY LEARNER Patient   HIGHEST LEVEL OF EDUCATION - PRIMARY LEARNER  2 YEARS OF COLLEGE   BARRIERS PRIMARY LEARNER NONE   CO-LEARNER CAREGIVER No   PRIMARY LANGUAGE ENGLISH   LEARNER PREFERENCE PRIMARY DEMONSTRATION     -   ANSWERED BY Darian CARABALLO SELF

## 2022-11-29 RX ORDER — INSULIN LISPRO 100 [IU]/ML
INJECTION, SOLUTION INTRAVENOUS; SUBCUTANEOUS
Qty: 15 ML | Refills: 0 | Status: SHIPPED | OUTPATIENT
Start: 2022-11-29

## 2022-12-21 DIAGNOSIS — E11.9 CONTROLLED TYPE 2 DIABETES MELLITUS WITHOUT COMPLICATION, WITHOUT LONG-TERM CURRENT USE OF INSULIN (HCC): ICD-10-CM

## 2022-12-22 RX ORDER — BLOOD SUGAR DIAGNOSTIC
STRIP MISCELLANEOUS
Qty: 100 STRIP | Refills: 3 | Status: SHIPPED | OUTPATIENT
Start: 2022-12-22

## 2022-12-22 RX ORDER — PEN NEEDLE, DIABETIC 32GX 5/32"
NEEDLE, DISPOSABLE MISCELLANEOUS
Qty: 400 PEN NEEDLE | Refills: 3 | Status: SHIPPED | OUTPATIENT
Start: 2022-12-22

## 2023-01-09 DIAGNOSIS — I10 ESSENTIAL HYPERTENSION: ICD-10-CM

## 2023-01-10 DIAGNOSIS — I50.9 CHRONIC CONGESTIVE HEART FAILURE, UNSPECIFIED HEART FAILURE TYPE (HCC): ICD-10-CM

## 2023-01-10 RX ORDER — SPIRONOLACTONE 50 MG/1
50 TABLET, FILM COATED ORAL DAILY
Qty: 90 TABLET | Refills: 3 | Status: SHIPPED | OUTPATIENT
Start: 2023-01-10

## 2023-01-10 RX ORDER — LOSARTAN POTASSIUM 100 MG/1
TABLET ORAL
Qty: 90 TABLET | Refills: 0 | Status: SHIPPED | OUTPATIENT
Start: 2023-01-10

## 2023-01-12 DIAGNOSIS — E11.9 CONTROLLED TYPE 2 DIABETES MELLITUS WITHOUT COMPLICATION, WITHOUT LONG-TERM CURRENT USE OF INSULIN (HCC): ICD-10-CM

## 2023-01-13 RX ORDER — INSULIN LISPRO 100 [IU]/ML
INJECTION, SOLUTION INTRAVENOUS; SUBCUTANEOUS
Qty: 15 ML | Refills: 0 | Status: SHIPPED | OUTPATIENT
Start: 2023-01-13

## 2023-01-25 DIAGNOSIS — I48.91 ATRIAL FIBRILLATION, UNSPECIFIED TYPE (HCC): ICD-10-CM

## 2023-01-25 DIAGNOSIS — E11.9 CONTROLLED TYPE 2 DIABETES MELLITUS WITHOUT COMPLICATION, WITHOUT LONG-TERM CURRENT USE OF INSULIN (HCC): ICD-10-CM

## 2023-01-25 DIAGNOSIS — I10 ESSENTIAL HYPERTENSION: ICD-10-CM

## 2023-01-26 RX ORDER — PEN NEEDLE, DIABETIC 32GX 5/32"
NEEDLE, DISPOSABLE MISCELLANEOUS
Qty: 400 PEN NEEDLE | Refills: 5 | Status: SHIPPED | OUTPATIENT
Start: 2023-01-26

## 2023-01-27 RX ORDER — AMLODIPINE BESYLATE 10 MG/1
TABLET ORAL
Qty: 90 TABLET | Refills: 0 | Status: SHIPPED | OUTPATIENT
Start: 2023-01-27

## 2023-01-27 RX ORDER — RIVAROXABAN 20 MG/1
TABLET, FILM COATED ORAL
Qty: 90 TABLET | Refills: 0 | Status: SHIPPED | OUTPATIENT
Start: 2023-01-27

## 2023-02-14 RX ORDER — ALBUTEROL SULFATE 90 UG/1
AEROSOL, METERED RESPIRATORY (INHALATION)
Qty: 18 G | Refills: 0 | Status: SHIPPED | OUTPATIENT
Start: 2023-02-14

## 2023-03-28 ENCOUNTER — OFFICE VISIT (OUTPATIENT)
Facility: CLINIC | Age: 53
End: 2023-03-28
Payer: COMMERCIAL

## 2023-03-28 VITALS
WEIGHT: 315 LBS | TEMPERATURE: 97.8 F | SYSTOLIC BLOOD PRESSURE: 116 MMHG | OXYGEN SATURATION: 96 % | DIASTOLIC BLOOD PRESSURE: 71 MMHG | HEIGHT: 73 IN | RESPIRATION RATE: 18 BRPM | HEART RATE: 80 BPM | BODY MASS INDEX: 41.75 KG/M2

## 2023-03-28 DIAGNOSIS — I10 PRIMARY HYPERTENSION: ICD-10-CM

## 2023-03-28 DIAGNOSIS — E78.00 HYPERCHOLESTEROLEMIA: ICD-10-CM

## 2023-03-28 DIAGNOSIS — E11.9 CONTROLLED TYPE 2 DIABETES MELLITUS WITHOUT COMPLICATION, WITHOUT LONG-TERM CURRENT USE OF INSULIN (HCC): Primary | ICD-10-CM

## 2023-03-28 DIAGNOSIS — G57.92 NEUROPATHY OF LEFT FOOT: ICD-10-CM

## 2023-03-28 PROBLEM — I50.9 CHRONIC CONGESTIVE HEART FAILURE (HCC): Status: RESOLVED | Noted: 2022-04-26 | Resolved: 2023-03-28

## 2023-03-28 PROBLEM — E66.01 MORBID OBESITY WITH BODY MASS INDEX (BMI) OF 40.0 TO 44.9 IN ADULT (HCC): Status: ACTIVE | Noted: 2020-03-20

## 2023-03-28 PROCEDURE — 85018 HEMOGLOBIN: CPT | Performed by: NURSE PRACTITIONER

## 2023-03-28 PROCEDURE — 3074F SYST BP LT 130 MM HG: CPT | Performed by: NURSE PRACTITIONER

## 2023-03-28 PROCEDURE — 3078F DIAST BP <80 MM HG: CPT | Performed by: NURSE PRACTITIONER

## 2023-03-28 PROCEDURE — 99214 OFFICE O/P EST MOD 30 MIN: CPT | Performed by: NURSE PRACTITIONER

## 2023-03-28 RX ORDER — INSULIN LISPRO 100 [IU]/ML
INJECTION, SOLUTION INTRAVENOUS; SUBCUTANEOUS
Qty: 5 ADJUSTABLE DOSE PRE-FILLED PEN SYRINGE | Refills: 3 | Status: SHIPPED | OUTPATIENT
Start: 2023-03-28

## 2023-03-28 RX ORDER — GABAPENTIN 100 MG/1
100 CAPSULE ORAL NIGHTLY
Qty: 90 CAPSULE | Refills: 1 | Status: SHIPPED | OUTPATIENT
Start: 2023-03-28 | End: 2023-09-24

## 2023-03-28 RX ORDER — FUROSEMIDE 40 MG/1
40 TABLET ORAL DAILY
Qty: 60 TABLET | Status: CANCELLED | OUTPATIENT
Start: 2023-03-28

## 2023-03-28 SDOH — ECONOMIC STABILITY: TRANSPORTATION INSECURITY
IN THE PAST 12 MONTHS, HAS THE LACK OF TRANSPORTATION KEPT YOU FROM MEDICAL APPOINTMENTS OR FROM GETTING MEDICATIONS?: NO

## 2023-03-28 SDOH — ECONOMIC STABILITY: TRANSPORTATION INSECURITY
IN THE PAST 12 MONTHS, HAS LACK OF TRANSPORTATION KEPT YOU FROM MEETINGS, WORK, OR FROM GETTING THINGS NEEDED FOR DAILY LIVING?: NO

## 2023-03-28 SDOH — ECONOMIC STABILITY: HOUSING INSECURITY
IN THE LAST 12 MONTHS, WAS THERE A TIME WHEN YOU DID NOT HAVE A STEADY PLACE TO SLEEP OR SLEPT IN A SHELTER (INCLUDING NOW)?: NO

## 2023-03-28 SDOH — ECONOMIC STABILITY: FOOD INSECURITY: WITHIN THE PAST 12 MONTHS, THE FOOD YOU BOUGHT JUST DIDN'T LAST AND YOU DIDN'T HAVE MONEY TO GET MORE.: NEVER TRUE

## 2023-03-28 SDOH — ECONOMIC STABILITY: FOOD INSECURITY: WITHIN THE PAST 12 MONTHS, YOU WORRIED THAT YOUR FOOD WOULD RUN OUT BEFORE YOU GOT MONEY TO BUY MORE.: NEVER TRUE

## 2023-03-28 SDOH — ECONOMIC STABILITY: INCOME INSECURITY: IN THE LAST 12 MONTHS, WAS THERE A TIME WHEN YOU WERE NOT ABLE TO PAY THE MORTGAGE OR RENT ON TIME?: NO

## 2023-03-28 ASSESSMENT — PATIENT HEALTH QUESTIONNAIRE - PHQ9
SUM OF ALL RESPONSES TO PHQ QUESTIONS 1-9: 0
SUM OF ALL RESPONSES TO PHQ9 QUESTIONS 1 & 2: 0
SUM OF ALL RESPONSES TO PHQ QUESTIONS 1-9: 0
1. LITTLE INTEREST OR PLEASURE IN DOING THINGS: 0
2. FEELING DOWN, DEPRESSED OR HOPELESS: 0
SUM OF ALL RESPONSES TO PHQ QUESTIONS 1-9: 0
SUM OF ALL RESPONSES TO PHQ QUESTIONS 1-9: 0

## 2023-03-28 ASSESSMENT — SOCIAL DETERMINANTS OF HEALTH (SDOH): HOW HARD IS IT FOR YOU TO PAY FOR THE VERY BASICS LIKE FOOD, HOUSING, MEDICAL CARE, AND HEATING?: NOT HARD AT ALL

## 2023-03-28 NOTE — PROGRESS NOTES
Room 7    Patient presents today for Point of Care A1C of 7.9 . LIO Callahan has been notified of results. When asked if patient has any concerns he would like to address with LIO Callahan patient states yes patient states on the left foot the last 3 toes have been tingling . Did patient bring someone? No    Did the patient have DME equipment? Yes [place]    Did you take your medication today? Yes       1. \"Have you been to the ER, urgent care clinic since your last visit? Hospitalized since your last visit? \" N/A    2. \"Have you seen or consulted any other health care providers outside of the [address] [place] [address] since your last visit? \" N/A    3. For patients aged 39-70: Has the patient had a colonoscopy / FIT/ Cologuard? Yes      If the patient is female:    4. For patients aged 41-77: Has the patient had a mammogram within the past 2 years? N/A      5. For patients aged 21-65: Has the patient had a pap smear? {Cancer Care Gap present? N/A      PHQ-9  3/28/2023   Little interest or pleasure in doing things 0   Little interest or pleasure in doing things -   Feeling down, depressed, or hopeless 0   PHQ-2 Score 0   Total Score PHQ 2 -   PHQ-9 Total Score 0          Health Maintenance Due   Topic Date Due    COVID-19 Vaccine (1) Never done    Diabetic foot exam  Never done    HIV screen  Never done    Hepatitis B vaccine (1 of 3 - Risk 3-dose series) Never done    DTaP/Tdap/Td vaccine (1 - Tdap) Never done    Shingles vaccine (1 of 2) Never done    Flu vaccine (1) 08/01/2022         Who is the primary learner? Patient    What is the preferred language for health care of the primary learner? ENGLISH    How does the primary learner prefer to learn new concepts?  OTHER (COMMENT) Patient states All   Answered By Self    Relationship to Learner SELF
reviewed. Constitutional:       Appearance: Normal appearance. HENT:      Head: Normocephalic and atraumatic. Eyes:      Pupils: Pupils are equal, round, and reactive to light. Cardiovascular:      Rate and Rhythm: Normal rate and regular rhythm. Pulses:           Dorsalis pedis pulses are 2+ on the right side and 2+ on the left side. Heart sounds: Normal heart sounds. No murmur heard. No friction rub. No gallop. Pulmonary:      Effort: Pulmonary effort is normal. No respiratory distress. Breath sounds: Normal breath sounds. Feet:      Right foot:      Protective Sensation: 6 sites tested. 6 sites sensed. Skin integrity: Skin integrity normal.      Left foot:      Protective Sensation: 6 sites tested. 6 sites sensed. Skin integrity: Skin integrity normal.      Comments: Decreased vibration sensation to left foot compared to right foot, endorses tingling to toes 3,4,5 on left foot  Neurological:      Mental Status: He is alert and oriented to person, place, and time. Deep Tendon Reflexes:      Reflex Scores:       Achilles reflexes are 2+ on the right side and 1+ on the left side. Psychiatric:         Mood and Affect: Mood normal.         Behavior: Behavior normal.         Thought Content: Thought content normal.         Judgment: Judgment normal.               I have discussed the diagnosis with the patient and the intended plan as seen in the above orders. The patient has received an After-Visit Summary and questions were answered concerning future plans. An After Visit Summary was printed and given to the patient. All diagnosis have been discussed with the patient and all of the patient's questions have been answered. Terri Gaxiola, AGNP-BC  [address]  [address]

## 2023-03-29 LAB — HEMOGLOBIN, POC: 7.9 G/DL

## 2023-03-30 RX ORDER — DULAGLUTIDE 3 MG/.5ML
INJECTION, SOLUTION SUBCUTANEOUS
Qty: 12 ML | Refills: 0 | OUTPATIENT
Start: 2023-03-30

## 2023-04-07 RX ORDER — DULAGLUTIDE 3 MG/.5ML
INJECTION, SOLUTION SUBCUTANEOUS
Qty: 12 ML | Refills: 0 | OUTPATIENT
Start: 2023-04-07

## 2023-04-07 RX ORDER — ASPIRIN 81 MG/1
81 TABLET, CHEWABLE ORAL DAILY
COMMUNITY
Start: 2020-10-07

## 2023-04-11 RX ORDER — DULAGLUTIDE 3 MG/.5ML
3 INJECTION, SOLUTION SUBCUTANEOUS
OUTPATIENT
Start: 2023-04-11

## 2023-04-20 RX ORDER — LOSARTAN POTASSIUM 100 MG/1
TABLET ORAL
Qty: 90 TABLET | Refills: 0 | Status: SHIPPED | OUTPATIENT
Start: 2023-04-20 | End: 2023-06-16

## 2023-04-20 RX ORDER — AMLODIPINE BESYLATE 10 MG/1
TABLET ORAL
Qty: 90 TABLET | Refills: 0 | Status: SHIPPED | OUTPATIENT
Start: 2023-04-20

## 2023-04-25 RX ORDER — DULAGLUTIDE 3 MG/.5ML
INJECTION, SOLUTION SUBCUTANEOUS
Qty: 12 ML | Refills: 0 | OUTPATIENT
Start: 2023-04-25

## 2023-04-30 RX ORDER — DULAGLUTIDE 3 MG/.5ML
INJECTION, SOLUTION SUBCUTANEOUS
Qty: 12 ML | Refills: 0 | OUTPATIENT
Start: 2023-04-30

## 2023-05-02 DIAGNOSIS — E11.9 CONTROLLED TYPE 2 DIABETES MELLITUS WITHOUT COMPLICATION, WITHOUT LONG-TERM CURRENT USE OF INSULIN (HCC): Primary | ICD-10-CM

## 2023-05-02 RX ORDER — DULAGLUTIDE 3 MG/.5ML
3 INJECTION, SOLUTION SUBCUTANEOUS
Qty: 12 ADJUSTABLE DOSE PRE-FILLED PEN SYRINGE | Refills: 1 | Status: SHIPPED | OUTPATIENT
Start: 2023-05-02

## 2023-05-15 RX ORDER — RIVAROXABAN 20 MG/1
TABLET, FILM COATED ORAL
Qty: 90 TABLET | Refills: 0 | OUTPATIENT
Start: 2023-05-15

## 2023-05-15 RX ORDER — DAPAGLIFLOZIN 10 MG/1
TABLET, FILM COATED ORAL
Qty: 90 TABLET | Refills: 1 | Status: SHIPPED | OUTPATIENT
Start: 2023-05-15

## 2023-05-30 NOTE — TELEPHONE ENCOUNTER
PCP: JARRET Nolasco CNP    Last appt:  10/18/2022   Future Appointments   Date Time Provider Giovany Mendoza   6/1/2023 10:15 AM AMA LAB AMA BS AMB   6/8/2023  9:30 AM JARRET Nolasco CNP AMA MARCELLA SUAREZ   6/16/2023 11:00 AM Kg Krishnamurthy MD [facility] BS AMB       Requested Prescriptions     Pending Prescriptions Disp Refills    rivaroxaban (XARELTO) 20 MG TABS tablet 30 tablet 0     Sig: Take 1 tablet by mouth daily (with breakfast)    isosorbide mononitrate (IMDUR) 30 MG extended release tablet 30 tablet 0     Sig: Take 1 tablet by mouth daily

## 2023-06-01 DIAGNOSIS — E78.00 HYPERCHOLESTEROLEMIA: ICD-10-CM

## 2023-06-01 DIAGNOSIS — E11.9 CONTROLLED TYPE 2 DIABETES MELLITUS WITHOUT COMPLICATION, WITHOUT LONG-TERM CURRENT USE OF INSULIN (HCC): Primary | ICD-10-CM

## 2023-06-01 DIAGNOSIS — I10 PRIMARY HYPERTENSION: ICD-10-CM

## 2023-06-01 RX ORDER — ISOSORBIDE MONONITRATE 30 MG/1
30 TABLET, EXTENDED RELEASE ORAL DAILY
Qty: 30 TABLET | Refills: 0 | Status: SHIPPED | OUTPATIENT
Start: 2023-06-01

## 2023-06-01 RX ORDER — BLOOD SUGAR DIAGNOSTIC
STRIP MISCELLANEOUS
Qty: 100 EACH | Refills: 1 | Status: SHIPPED | OUTPATIENT
Start: 2023-06-01

## 2023-06-02 LAB
ALBUMIN SERPL-MCNC: 4.4 G/DL (ref 3.8–4.9)
ALBUMIN/CREAT UR: 4 MG/G CREAT (ref 0–29)
ALBUMIN/GLOB SERPL: 1.6 {RATIO} (ref 1.2–2.2)
ALP SERPL-CCNC: 118 IU/L (ref 44–121)
ALT SERPL-CCNC: 20 IU/L (ref 0–44)
AST SERPL-CCNC: 17 IU/L (ref 0–40)
BILIRUB SERPL-MCNC: 0.3 MG/DL (ref 0–1.2)
BUN SERPL-MCNC: 14 MG/DL (ref 6–24)
BUN/CREAT SERPL: 13 (ref 9–20)
CALCIUM SERPL-MCNC: 9.5 MG/DL (ref 8.7–10.2)
CHLORIDE SERPL-SCNC: 106 MMOL/L (ref 96–106)
CHOLEST SERPL-MCNC: 134 MG/DL (ref 100–199)
CO2 SERPL-SCNC: 22 MMOL/L (ref 20–29)
CREAT SERPL-MCNC: 1.07 MG/DL (ref 0.76–1.27)
CREAT UR-MCNC: 104.4 MG/DL
EGFRCR SERPLBLD CKD-EPI 2021: 82 ML/MIN/1.73
GLOBULIN SER CALC-MCNC: 2.8 G/DL (ref 1.5–4.5)
GLUCOSE SERPL-MCNC: 137 MG/DL (ref 70–99)
HBA1C MFR BLD: 8.9 % (ref 4.8–5.6)
HDLC SERPL-MCNC: 33 MG/DL
LDLC SERPL CALC-MCNC: 59 MG/DL (ref 0–99)
MICROALBUMIN UR-MCNC: 4.1 UG/ML
POTASSIUM SERPL-SCNC: 4.8 MMOL/L (ref 3.5–5.2)
PROT SERPL-MCNC: 7.2 G/DL (ref 6–8.5)
SODIUM SERPL-SCNC: 144 MMOL/L (ref 134–144)
SPECIMEN STATUS REPORT: NORMAL
TRIGL SERPL-MCNC: 265 MG/DL (ref 0–149)
VLDLC SERPL CALC-MCNC: 42 MG/DL (ref 5–40)

## 2023-06-08 ENCOUNTER — OFFICE VISIT (OUTPATIENT)
Facility: CLINIC | Age: 53
End: 2023-06-08
Payer: COMMERCIAL

## 2023-06-08 VITALS
WEIGHT: 312 LBS | RESPIRATION RATE: 18 BRPM | DIASTOLIC BLOOD PRESSURE: 69 MMHG | TEMPERATURE: 98.2 F | SYSTOLIC BLOOD PRESSURE: 104 MMHG | HEART RATE: 94 BPM | OXYGEN SATURATION: 93 % | BODY MASS INDEX: 41.35 KG/M2 | HEIGHT: 73 IN

## 2023-06-08 DIAGNOSIS — E11.9 CONTROLLED TYPE 2 DIABETES MELLITUS WITHOUT COMPLICATION, WITHOUT LONG-TERM CURRENT USE OF INSULIN (HCC): Primary | ICD-10-CM

## 2023-06-08 DIAGNOSIS — I10 PRIMARY HYPERTENSION: ICD-10-CM

## 2023-06-08 DIAGNOSIS — J43.9 PULMONARY EMPHYSEMA, UNSPECIFIED EMPHYSEMA TYPE (HCC): ICD-10-CM

## 2023-06-08 DIAGNOSIS — E78.00 HYPERCHOLESTEROLEMIA: ICD-10-CM

## 2023-06-08 PROCEDURE — 3074F SYST BP LT 130 MM HG: CPT | Performed by: NURSE PRACTITIONER

## 2023-06-08 PROCEDURE — 3078F DIAST BP <80 MM HG: CPT | Performed by: NURSE PRACTITIONER

## 2023-06-08 PROCEDURE — 99214 OFFICE O/P EST MOD 30 MIN: CPT | Performed by: NURSE PRACTITIONER

## 2023-06-08 PROCEDURE — 3052F HG A1C>EQUAL 8.0%<EQUAL 9.0%: CPT | Performed by: NURSE PRACTITIONER

## 2023-06-08 RX ORDER — FLUTICASONE PROPIONATE AND SALMETEROL 50; 250 UG/1; UG/1
POWDER RESPIRATORY (INHALATION)
COMMUNITY
Start: 2023-04-20

## 2023-06-08 RX ORDER — INSULIN GLARGINE 100 [IU]/ML
60 INJECTION, SOLUTION SUBCUTANEOUS NIGHTLY
Qty: 5 ADJUSTABLE DOSE PRE-FILLED PEN SYRINGE | Refills: 1 | Status: SHIPPED
Start: 2023-06-08

## 2023-06-08 ASSESSMENT — PATIENT HEALTH QUESTIONNAIRE - PHQ9
SUM OF ALL RESPONSES TO PHQ QUESTIONS 1-9: 0
SUM OF ALL RESPONSES TO PHQ QUESTIONS 1-9: 0
SUM OF ALL RESPONSES TO PHQ9 QUESTIONS 1 & 2: 0
1. LITTLE INTEREST OR PLEASURE IN DOING THINGS: 0
SUM OF ALL RESPONSES TO PHQ QUESTIONS 1-9: 0
SUM OF ALL RESPONSES TO PHQ QUESTIONS 1-9: 0
2. FEELING DOWN, DEPRESSED OR HOPELESS: 0

## 2023-06-08 NOTE — PROGRESS NOTES
Room 8     When asked if patient has any concerns he would like to address with LIO Champion Real patient states yes the medication Gabapentin is not working for the [employer] in my foot . Did patient bring someone? No     Did the patient have DME equipment? Yes cane     Did you take your medication today? Yes       1. \"Have you been to the ER, urgent care clinic since your last visit? Hospitalized since your last visit? \" No     2. \"Have you seen or consulted any other health care providers outside of the [address] [place] [address] since your last visit? \" No     3. For patients aged 39-70: Has the patient had a colonoscopy / FIT/ Cologuard? Yes      If the patient is female:    4. For patients aged 41-77: Has the patient had a mammogram within the past 2 years? N/A      5. For patients aged 21-65: Has the patient had a pap smear? {Cancer Care Gap present?  N/A      PHQ-9  6/8/2023   Little interest or pleasure in doing things 0   Little interest or pleasure in doing things -   Feeling down, depressed, or hopeless 0   PHQ-2 Score 0   Total Score PHQ 2 -   PHQ-9 Total Score 0          Health Maintenance Due   Topic Date Due    COVID-19 Vaccine (1) Never done    HIV screen  Never done    Hepatitis B vaccine (1 of 3 - Risk 3-dose series) Never done    DTaP/Tdap/Td vaccine (1 - Tdap) Never done    Shingles vaccine (1 of 2) Never done
day   Home glucose readings: am: 158  afternoon: 117-120 ( if he does not eat)  evenin   Denies hypoglycemic episodes Yes  Denies polyuria, polydipsia, paraesthesia, vision changes? No and paraesthesia  Engaging in daily exercise? None   There are no preventive care reminders to display for this patient. Lab Results   Component Value Date/Time    LABA1C 8.9 2023 12:00 AM   ]  Lab Results   Component Value Date/Time    MALBCR 4 2023 12:00 AM   ]  Diabetic medications:   Key Antihyperglycemic Medications            FARXIGA 10 MG tablet (Taking)    Sig: Take 1 tablet by mouth once daily    Dulaglutide (TRULICITY) 3 JY/7.0EF SOPN (Taking)    Sig - Route: Inject 3 mg into the skin every 7 days - SubCUTAneous    insulin lispro, 1 Unit Dial, (HUMALOG/ADMELOG) 100 UNIT/ML SOPN (Taking)    Sig: INJECT 5 UNITS SUBCUTANEOUSLY WITH MEALS THREE TIMES DAILY (STOP  HUMALOOG  75/25)    insulin glargine (LANTUS SOLOSTAR) 100 UNIT/ML injection pen (Taking)    Class: Historical Med    metFORMIN (GLUCOPHAGE) 500 MG tablet (Taking)    Class: Historical Med         Hypertension:  /69 Comment: feet flat on floor  Pulse 94   Temp 98.2 °F (36.8 °C) (Temporal)   Resp 18   Ht 6' 1\" (1.854 m)   Wt (!) 312 lb (141.5 kg)   SpO2 93%   BMI 41.16 kg/m²    Patient reports taking medications as instructed. Yes   Medication side effects noted no  Headache upon wakening. no   Home BP monitoring: yes, 165/70  Do you experience chest pain/pressure or SOB with exertion? no  Maintain a low Sodium diet?  Yes  Lab Results   Component Value Date/Time    BUN 14 2023 12:00 AM    CREATININE 1.07 2023 12:00 AM    LABGLOM 82 2023 12:00 AM    K 4.8 2023 12:00 AM     Key Anti-Hypertensive Meds            losartan (COZAAR) 100 MG tablet (Taking)    Sig: Take 1 tablet by mouth once daily    Notes to Pharmacy: Do not send refill requests to me    Renewals       Renewal provider: Supriya Oh MD

## 2023-06-08 NOTE — PATIENT INSTRUCTIONS
Increase the Lantus insulin to 60 units a day  Goal is a morning glucose of around 100-125 and a glucose of less than 140 1 1/2 to 2 hours after your eat

## 2023-07-05 RX ORDER — RIVAROXABAN 20 MG/1
TABLET, FILM COATED ORAL
Qty: 30 TABLET | Refills: 5 | Status: SHIPPED | OUTPATIENT
Start: 2023-07-05

## 2023-07-05 RX ORDER — ISOSORBIDE MONONITRATE 30 MG/1
TABLET, EXTENDED RELEASE ORAL
Qty: 30 TABLET | Refills: 5 | Status: SHIPPED | OUTPATIENT
Start: 2023-07-05 | End: 2023-08-04 | Stop reason: SDUPTHER

## 2023-07-05 NOTE — TELEPHONE ENCOUNTER
PCP: Evelyn Rodriguez, APRN - CNP    Last appt:  6/16/2023   Future Appointments   Date Time Provider [address]   3/14/2024 10:30 AM Kg Sebastian MD [facility] BS AMB       Requested Prescriptions     Pending Prescriptions Disp Refills    XARELTO 20 MG TABS tablet [Pharmacy Med Name: Xarelto 20 MG Oral Tablet] 30 tablet 5     Sig: Take 1 tablet by mouth once daily with breakfast    isosorbide mononitrate (IMDUR) 30 MG extended release tablet [Pharmacy Med Name: Isosorbide Mononitrate ER 30 MG Oral Tablet Extended Release 24 Hour] 30 tablet 5     Sig: Take 1 tablet by mouth once daily

## 2023-08-04 ENCOUNTER — OFFICE VISIT (OUTPATIENT)
Facility: CLINIC | Age: 53
End: 2023-08-04
Payer: COMMERCIAL

## 2023-08-04 VITALS
RESPIRATION RATE: 18 BRPM | SYSTOLIC BLOOD PRESSURE: 118 MMHG | HEART RATE: 67 BPM | WEIGHT: 315 LBS | TEMPERATURE: 97.6 F | HEIGHT: 71 IN | OXYGEN SATURATION: 91 % | DIASTOLIC BLOOD PRESSURE: 83 MMHG | BODY MASS INDEX: 44.1 KG/M2

## 2023-08-04 DIAGNOSIS — E11.65 CONTROLLED TYPE 2 DIABETES MELLITUS WITH HYPERGLYCEMIA, WITH LONG-TERM CURRENT USE OF INSULIN (HCC): Primary | ICD-10-CM

## 2023-08-04 DIAGNOSIS — J44.9 CHRONIC OBSTRUCTIVE PULMONARY DISEASE, UNSPECIFIED COPD TYPE (HCC): ICD-10-CM

## 2023-08-04 DIAGNOSIS — I10 PRIMARY HYPERTENSION: ICD-10-CM

## 2023-08-04 DIAGNOSIS — Z79.4 CONTROLLED TYPE 2 DIABETES MELLITUS WITH HYPERGLYCEMIA, WITH LONG-TERM CURRENT USE OF INSULIN (HCC): Primary | ICD-10-CM

## 2023-08-04 DIAGNOSIS — Z11.4 SCREENING FOR HIV (HUMAN IMMUNODEFICIENCY VIRUS): ICD-10-CM

## 2023-08-04 DIAGNOSIS — I42.9 CARDIOMYOPATHY, UNSPECIFIED TYPE (HCC): ICD-10-CM

## 2023-08-04 PROCEDURE — 3074F SYST BP LT 130 MM HG: CPT | Performed by: NURSE PRACTITIONER

## 2023-08-04 PROCEDURE — 99213 OFFICE O/P EST LOW 20 MIN: CPT | Performed by: NURSE PRACTITIONER

## 2023-08-04 PROCEDURE — 3079F DIAST BP 80-89 MM HG: CPT | Performed by: NURSE PRACTITIONER

## 2023-08-04 PROCEDURE — 3052F HG A1C>EQUAL 8.0%<EQUAL 9.0%: CPT | Performed by: NURSE PRACTITIONER

## 2023-08-04 RX ORDER — AMLODIPINE BESYLATE 10 MG/1
10 TABLET ORAL DAILY
Qty: 90 TABLET | Refills: 1 | Status: SHIPPED | OUTPATIENT
Start: 2023-08-04

## 2023-08-04 RX ORDER — GABAPENTIN 300 MG/1
300 CAPSULE ORAL NIGHTLY
Qty: 90 CAPSULE | Refills: 0 | Status: SHIPPED | OUTPATIENT
Start: 2023-08-04 | End: 2023-11-02

## 2023-08-04 RX ORDER — FUROSEMIDE 80 MG
80 TABLET ORAL DAILY
Qty: 90 TABLET | Refills: 1 | Status: SHIPPED | OUTPATIENT
Start: 2023-08-04

## 2023-08-04 RX ORDER — CARVEDILOL 25 MG/1
25 TABLET ORAL 2 TIMES DAILY WITH MEALS
Qty: 180 TABLET | Refills: 1 | Status: SHIPPED | OUTPATIENT
Start: 2023-08-04

## 2023-08-04 RX ORDER — ISOSORBIDE MONONITRATE 30 MG/1
30 TABLET, EXTENDED RELEASE ORAL DAILY
Qty: 90 TABLET | Refills: 1 | Status: SHIPPED | OUTPATIENT
Start: 2023-08-04

## 2023-08-04 RX ORDER — SPIRONOLACTONE 50 MG/1
50 TABLET, FILM COATED ORAL DAILY
Qty: 90 TABLET | Refills: 3 | Status: SHIPPED | OUTPATIENT
Start: 2023-08-04

## 2023-08-04 ASSESSMENT — PATIENT HEALTH QUESTIONNAIRE - PHQ9
SUM OF ALL RESPONSES TO PHQ QUESTIONS 1-9: 0
1. LITTLE INTEREST OR PLEASURE IN DOING THINGS: 0
SUM OF ALL RESPONSES TO PHQ QUESTIONS 1-9: 0
SUM OF ALL RESPONSES TO PHQ QUESTIONS 1-9: 0
SUM OF ALL RESPONSES TO PHQ9 QUESTIONS 1 & 2: 0
2. FEELING DOWN, DEPRESSED OR HOPELESS: 0
SUM OF ALL RESPONSES TO PHQ QUESTIONS 1-9: 0

## 2023-08-04 NOTE — PROGRESS NOTES
9 [facility], [address] [place] [address] [place]               [phone]      Rickey Bo is a 45 y.o. male and presents with Follow-up Chronic Condition and Diabetes       Assessment/Plan:    1. Controlled type 2 diabetes mellitus with hyperglycemia, with long-term current use of insulin (HCC)  -     gabapentin (NEURONTIN) 300 MG capsule; Take 1 capsule by mouth nightly for 90 days. Max Daily Amount: 300 mg, Disp-90 capsule, R-0Normal  -     Lipid Panel; Future  -     Hemoglobin A1C; Future  2. Cardiomyopathy, unspecified type ([address])  -     carvedilol (COREG) 25 MG tablet; Take 1 tablet by mouth 2 times daily (with meals), Disp-180 tablet, R-1Normal  -     isosorbide mononitrate (IMDUR) 30 MG extended release tablet; Take 1 tablet by mouth daily, Disp-90 tablet, R-1Normal  -     furosemide (LASIX) 80 MG tablet; Take 1 tablet by mouth daily, Disp-90 tablet, R-1Normal  -     spironolactone (ALDACTONE) 50 MG tablet; Take 1 tablet by mouth daily, Disp-90 tablet, R-3Normal  3. Chronic obstructive pulmonary disease, unspecified COPD type ([facility])  -     ipratropium (ATROVENT HFA) 17 MCG/ACT inhaler; Inhale 1 puff into the lungs every 6 hours as needed for Wheezing, Disp-1 each, R-5Normal  4. Primary hypertension  -     amLODIPine (NORVASC) 10 MG tablet; Take 1 tablet by mouth daily, Disp-90 tablet, R-1Do not send refill requests to meNormal  -     Comprehensive Metabolic Panel; Future  5. Screening for HIV (human immunodeficiency virus)  -     HIV 1/2 Ag/Ab, 4TH Generation,W Rflx Confirm; Future     Blood sugars better controlled on increased dose of lantus, continue the same  Increase dose of gabapentin for neuropathy    Refills provided    Follow up labs prior to next visit    Follow up and disposition:   Return in about 3 months (around 11/4/2023) for DM, HTN, HLD, 30min, office, w/labsprior. Subjective:    Labs obtained prior to visit? No  Reviewed with patient?  N/A    Neuropathy  Left

## 2023-08-04 NOTE — PROGRESS NOTES
Room 7    Reyes Mendoza had concerns including Follow-up Chronic Condition and Diabetes. for today's visit . When asked if patient has any concerns he would like to address with LIO Daly . Patient states my left foot is bothering me and I would like o discuss taking Gabapentin. LIO Daly has been notified  of patient concerns . Did patient bring someone? No     Did the patient have DME equipment? Yes cane     Did you take your medication today? Yes      1. \"Have you been to the ER, urgent care clinic since your last visit? Hospitalized since your last visit? \" . NO    2. \"Have you seen or consulted any other health care providers outside of the [address] [place] [address] since your last visit? \" No     3. For patients aged 43-73: Has the patient had a colonoscopy / FIT/ Cologuard? Yes      If the patient is female:    4. For patients aged 43-66: Has the patient had a mammogram within the past 2 years? N/A      5. For patients aged 21-65: Has the patient had a pap smear? {Cancer Care Gap present?  N/A       PHQ-9  8/4/2023   Little interest or pleasure in doing things 0   Little interest or pleasure in doing things -   Feeling down, depressed, or hopeless 0   PHQ-2 Score 0   Total Score PHQ 2 -   PHQ-9 Total Score 0          Health Maintenance Due   Topic Date Due    COVID-19 Vaccine (1) Never done    HIV screen  Never done    Hepatitis B vaccine (1 of 3 - Risk 3-dose series) Never done    DTaP/Tdap/Td vaccine (1 - Tdap) Never done    Shingles vaccine (1 of 2) Never done    Flu vaccine (1) 08/01/2023

## 2023-08-30 RX ORDER — HYDRALAZINE HYDROCHLORIDE 50 MG/1
TABLET, FILM COATED ORAL
Qty: 90 TABLET | Refills: 0 | OUTPATIENT
Start: 2023-08-30

## 2023-09-06 RX ORDER — HYDRALAZINE HYDROCHLORIDE 50 MG/1
TABLET, FILM COATED ORAL
Qty: 90 TABLET | Refills: 0 | OUTPATIENT
Start: 2023-09-06

## 2023-09-06 RX ORDER — HYDRALAZINE HYDROCHLORIDE 50 MG/1
50 TABLET, FILM COATED ORAL 3 TIMES DAILY
Qty: 270 TABLET | Refills: 0 | Status: SHIPPED | OUTPATIENT
Start: 2023-09-06

## 2023-09-17 RX ORDER — BLOOD SUGAR DIAGNOSTIC
STRIP MISCELLANEOUS
Qty: 100 EACH | Refills: 1 | Status: SHIPPED | OUTPATIENT
Start: 2023-09-17

## 2023-09-17 RX ORDER — ALBUTEROL SULFATE 90 UG/1
AEROSOL, METERED RESPIRATORY (INHALATION)
Qty: 18 G | Refills: 1 | Status: SHIPPED | OUTPATIENT
Start: 2023-09-17

## 2023-10-30 ENCOUNTER — HOSPITAL ENCOUNTER (OUTPATIENT)
Facility: HOSPITAL | Age: 53
Setting detail: SPECIMEN
Discharge: HOME | End: 2023-11-02
Payer: COMMERCIAL

## 2023-10-30 LAB
ALBUMIN SERPL-MCNC: 3.7 G/DL (ref 3.4–5)
ALBUMIN/GLOB SERPL: 1.1 (ref 0.8–1.7)
ALP SERPL-CCNC: 122 U/L (ref 45–117)
ALT SERPL-CCNC: 24 U/L (ref 16–61)
ANION GAP SERPL CALC-SCNC: 5 MMOL/L (ref 3–18)
AST SERPL-CCNC: 9 U/L (ref 10–38)
BILIRUB SERPL-MCNC: 0.6 MG/DL (ref 0.2–1)
BUN SERPL-MCNC: 22 MG/DL (ref 7–18)
BUN/CREAT SERPL: 18 (ref 12–20)
CALCIUM SERPL-MCNC: 9.4 MG/DL (ref 8.5–10.1)
CHLORIDE SERPL-SCNC: 107 MMOL/L (ref 100–111)
CHOLEST SERPL-MCNC: 140 MG/DL
CO2 SERPL-SCNC: 27 MMOL/L (ref 21–32)
CREAT SERPL-MCNC: 1.24 MG/DL (ref 0.6–1.3)
EST. AVERAGE GLUCOSE BLD GHB EST-MCNC: 166 MG/DL
GLOBULIN SER CALC-MCNC: 3.5 G/DL (ref 2–4)
GLUCOSE SERPL-MCNC: 203 MG/DL (ref 74–99)
HBA1C MFR BLD: 7.4 % (ref 4.2–5.6)
HDLC SERPL-MCNC: 35 MG/DL (ref 40–60)
HDLC SERPL: 4 (ref 0–5)
LDLC SERPL CALC-MCNC: 69.8 MG/DL (ref 0–100)
LIPID PANEL: ABNORMAL
POTASSIUM SERPL-SCNC: 4.7 MMOL/L (ref 3.5–5.5)
PROT SERPL-MCNC: 7.2 G/DL (ref 6.4–8.2)
SODIUM SERPL-SCNC: 139 MMOL/L (ref 136–145)
TRIGL SERPL-MCNC: 176 MG/DL
VLDLC SERPL CALC-MCNC: 35.2 MG/DL

## 2023-10-30 PROCEDURE — 36415 COLL VENOUS BLD VENIPUNCTURE: CPT

## 2023-10-30 PROCEDURE — 83036 HEMOGLOBIN GLYCOSYLATED A1C: CPT

## 2023-10-30 PROCEDURE — 87389 HIV-1 AG W/HIV-1&-2 AB AG IA: CPT

## 2023-10-30 PROCEDURE — 80053 COMPREHEN METABOLIC PANEL: CPT

## 2023-10-30 PROCEDURE — 80061 LIPID PANEL: CPT

## 2023-11-01 LAB
HIV 1+2 AB+HIV1 P24 AG SERPL QL IA: NONREACTIVE
HIV 1/2 RESULT COMMENT: NORMAL

## 2023-11-06 ENCOUNTER — OFFICE VISIT (OUTPATIENT)
Facility: CLINIC | Age: 53
End: 2023-11-06
Payer: COMMERCIAL

## 2023-11-06 VITALS
RESPIRATION RATE: 24 BRPM | BODY MASS INDEX: 44.1 KG/M2 | HEIGHT: 71 IN | DIASTOLIC BLOOD PRESSURE: 63 MMHG | SYSTOLIC BLOOD PRESSURE: 106 MMHG | HEART RATE: 75 BPM | WEIGHT: 315 LBS | OXYGEN SATURATION: 95 % | TEMPERATURE: 98.3 F

## 2023-11-06 DIAGNOSIS — E11.65 CONTROLLED TYPE 2 DIABETES MELLITUS WITH HYPERGLYCEMIA, WITH LONG-TERM CURRENT USE OF INSULIN (HCC): Primary | ICD-10-CM

## 2023-11-06 DIAGNOSIS — I10 PRIMARY HYPERTENSION: ICD-10-CM

## 2023-11-06 DIAGNOSIS — E78.00 HYPERCHOLESTEROLEMIA: ICD-10-CM

## 2023-11-06 DIAGNOSIS — Z79.4 CONTROLLED TYPE 2 DIABETES MELLITUS WITH HYPERGLYCEMIA, WITH LONG-TERM CURRENT USE OF INSULIN (HCC): Primary | ICD-10-CM

## 2023-11-06 PROCEDURE — 3074F SYST BP LT 130 MM HG: CPT | Performed by: NURSE PRACTITIONER

## 2023-11-06 PROCEDURE — 3051F HG A1C>EQUAL 7.0%<8.0%: CPT | Performed by: NURSE PRACTITIONER

## 2023-11-06 PROCEDURE — 99214 OFFICE O/P EST MOD 30 MIN: CPT | Performed by: NURSE PRACTITIONER

## 2023-11-06 PROCEDURE — 3078F DIAST BP <80 MM HG: CPT | Performed by: NURSE PRACTITIONER

## 2023-11-06 RX ORDER — PEN NEEDLE, DIABETIC 32GX 5/32"
NEEDLE, DISPOSABLE MISCELLANEOUS
COMMUNITY
Start: 2023-09-14

## 2023-11-06 RX ORDER — DULAGLUTIDE 4.5 MG/.5ML
4.5 INJECTION, SOLUTION SUBCUTANEOUS WEEKLY
Qty: 6 ML | Refills: 1 | Status: SHIPPED | OUTPATIENT
Start: 2023-11-06

## 2023-11-06 SDOH — ECONOMIC STABILITY: INCOME INSECURITY: HOW HARD IS IT FOR YOU TO PAY FOR THE VERY BASICS LIKE FOOD, HOUSING, MEDICAL CARE, AND HEATING?: NOT HARD AT ALL

## 2023-11-06 SDOH — ECONOMIC STABILITY: FOOD INSECURITY: WITHIN THE PAST 12 MONTHS, THE FOOD YOU BOUGHT JUST DIDN'T LAST AND YOU DIDN'T HAVE MONEY TO GET MORE.: NEVER TRUE

## 2023-11-06 SDOH — ECONOMIC STABILITY: FOOD INSECURITY: WITHIN THE PAST 12 MONTHS, YOU WORRIED THAT YOUR FOOD WOULD RUN OUT BEFORE YOU GOT MONEY TO BUY MORE.: NEVER TRUE

## 2023-11-06 ASSESSMENT — ANXIETY QUESTIONNAIRES
6. BECOMING EASILY ANNOYED OR IRRITABLE: 0
1. FEELING NERVOUS, ANXIOUS, OR ON EDGE: 0
5. BEING SO RESTLESS THAT IT IS HARD TO SIT STILL: 0
IF YOU CHECKED OFF ANY PROBLEMS ON THIS QUESTIONNAIRE, HOW DIFFICULT HAVE THESE PROBLEMS MADE IT FOR YOU TO DO YOUR WORK, TAKE CARE OF THINGS AT HOME, OR GET ALONG WITH OTHER PEOPLE: NOT DIFFICULT AT ALL
7. FEELING AFRAID AS IF SOMETHING AWFUL MIGHT HAPPEN: 0
3. WORRYING TOO MUCH ABOUT DIFFERENT THINGS: 0
GAD7 TOTAL SCORE: 0
4. TROUBLE RELAXING: 0
2. NOT BEING ABLE TO STOP OR CONTROL WORRYING: 0

## 2023-11-06 ASSESSMENT — PATIENT HEALTH QUESTIONNAIRE - PHQ9
1. LITTLE INTEREST OR PLEASURE IN DOING THINGS: 0
SUM OF ALL RESPONSES TO PHQ QUESTIONS 1-9: 0
SUM OF ALL RESPONSES TO PHQ QUESTIONS 1-9: 0
2. FEELING DOWN, DEPRESSED OR HOPELESS: 0
SUM OF ALL RESPONSES TO PHQ9 QUESTIONS 1 & 2: 0
SUM OF ALL RESPONSES TO PHQ QUESTIONS 1-9: 0
SUM OF ALL RESPONSES TO PHQ QUESTIONS 1-9: 0

## 2023-12-06 DIAGNOSIS — E78.00 HYPERCHOLESTEROLEMIA: Primary | ICD-10-CM

## 2023-12-06 DIAGNOSIS — Z79.4 CONTROLLED TYPE 2 DIABETES MELLITUS WITH HYPERGLYCEMIA, WITH LONG-TERM CURRENT USE OF INSULIN (HCC): ICD-10-CM

## 2023-12-06 DIAGNOSIS — E11.65 CONTROLLED TYPE 2 DIABETES MELLITUS WITH HYPERGLYCEMIA, WITH LONG-TERM CURRENT USE OF INSULIN (HCC): ICD-10-CM

## 2023-12-07 RX ORDER — ATORVASTATIN CALCIUM 40 MG/1
40 TABLET, FILM COATED ORAL NIGHTLY
Qty: 90 TABLET | Refills: 1 | Status: SHIPPED | OUTPATIENT
Start: 2023-12-07

## 2023-12-07 RX ORDER — DAPAGLIFLOZIN 10 MG/1
TABLET, FILM COATED ORAL
Qty: 90 TABLET | Refills: 1 | Status: SHIPPED | OUTPATIENT
Start: 2023-12-07

## 2023-12-27 NOTE — TELEPHONE ENCOUNTER
PCP: Tyron Tyson APRN - CNP    Last appt:  6/16/2023   Future Appointments   Date Time Provider [address]   3/14/2024 10:30 AM Maegan Small MD [facility] BS AMB       Requested Prescriptions     Pending Prescriptions Disp Refills    ENTRESTO 24-26 MG per tablet [Pharmacy Med Name: Tamar Daniella MG Oral Tablet] 60 tablet 5     Sig: Take 1 tablet by mouth twice daily

## 2023-12-28 RX ORDER — SACUBITRIL AND VALSARTAN 24; 26 MG/1; MG/1
1 TABLET, FILM COATED ORAL 2 TIMES DAILY
Qty: 60 TABLET | Refills: 5 | Status: SHIPPED | OUTPATIENT
Start: 2023-12-28

## 2024-01-02 RX ORDER — HYDRALAZINE HYDROCHLORIDE 50 MG/1
50 TABLET, FILM COATED ORAL 3 TIMES DAILY
Qty: 270 TABLET | Refills: 1 | Status: SHIPPED | OUTPATIENT
Start: 2024-01-02

## 2024-01-02 RX ORDER — BLOOD SUGAR DIAGNOSTIC
STRIP MISCELLANEOUS
Qty: 100 EACH | Refills: 1 | Status: SHIPPED | OUTPATIENT
Start: 2024-01-02

## 2024-01-26 DIAGNOSIS — E11.65 CONTROLLED TYPE 2 DIABETES MELLITUS WITH HYPERGLYCEMIA, WITH LONG-TERM CURRENT USE OF INSULIN (HCC): ICD-10-CM

## 2024-01-26 DIAGNOSIS — Z79.4 CONTROLLED TYPE 2 DIABETES MELLITUS WITH HYPERGLYCEMIA, WITH LONG-TERM CURRENT USE OF INSULIN (HCC): ICD-10-CM

## 2024-01-26 RX ORDER — DULAGLUTIDE 4.5 MG/.5ML
INJECTION, SOLUTION SUBCUTANEOUS
Qty: 4 ML | Refills: 0 | OUTPATIENT
Start: 2024-01-26

## 2024-01-29 RX ORDER — DULAGLUTIDE 4.5 MG/.5ML
4.5 INJECTION, SOLUTION SUBCUTANEOUS WEEKLY
Qty: 6 ML | Refills: 1 | OUTPATIENT
Start: 2024-01-29

## 2024-02-06 DIAGNOSIS — Z79.4 CONTROLLED TYPE 2 DIABETES MELLITUS WITH HYPERGLYCEMIA, WITH LONG-TERM CURRENT USE OF INSULIN (HCC): ICD-10-CM

## 2024-02-06 DIAGNOSIS — E11.65 CONTROLLED TYPE 2 DIABETES MELLITUS WITH HYPERGLYCEMIA, WITH LONG-TERM CURRENT USE OF INSULIN (HCC): ICD-10-CM

## 2024-02-06 RX ORDER — GABAPENTIN 300 MG/1
CAPSULE ORAL
Qty: 30 CAPSULE | Refills: 0 | OUTPATIENT
Start: 2024-02-06

## 2024-02-06 RX ORDER — DULAGLUTIDE 4.5 MG/.5ML
INJECTION, SOLUTION SUBCUTANEOUS
Qty: 4 ML | Refills: 0 | OUTPATIENT
Start: 2024-02-06

## 2024-02-17 DIAGNOSIS — I10 PRIMARY HYPERTENSION: ICD-10-CM

## 2024-02-17 DIAGNOSIS — Z79.4 CONTROLLED TYPE 2 DIABETES MELLITUS WITH HYPERGLYCEMIA, WITH LONG-TERM CURRENT USE OF INSULIN (HCC): ICD-10-CM

## 2024-02-17 DIAGNOSIS — I42.9 CARDIOMYOPATHY, UNSPECIFIED TYPE (HCC): ICD-10-CM

## 2024-02-17 DIAGNOSIS — E11.9 CONTROLLED TYPE 2 DIABETES MELLITUS WITHOUT COMPLICATION, WITHOUT LONG-TERM CURRENT USE OF INSULIN (HCC): ICD-10-CM

## 2024-02-17 DIAGNOSIS — E11.65 CONTROLLED TYPE 2 DIABETES MELLITUS WITH HYPERGLYCEMIA, WITH LONG-TERM CURRENT USE OF INSULIN (HCC): ICD-10-CM

## 2024-02-19 RX ORDER — CARVEDILOL 25 MG/1
25 TABLET ORAL 2 TIMES DAILY WITH MEALS
Qty: 180 TABLET | Refills: 0 | OUTPATIENT
Start: 2024-02-19

## 2024-02-19 RX ORDER — DULAGLUTIDE 4.5 MG/.5ML
INJECTION, SOLUTION SUBCUTANEOUS
Qty: 4 ML | Refills: 0 | OUTPATIENT
Start: 2024-02-19

## 2024-02-19 RX ORDER — GABAPENTIN 300 MG/1
CAPSULE ORAL
Qty: 30 CAPSULE | Refills: 0 | OUTPATIENT
Start: 2024-02-19

## 2024-02-19 RX ORDER — AMLODIPINE BESYLATE 10 MG/1
10 TABLET ORAL DAILY
Qty: 90 TABLET | Refills: 0 | OUTPATIENT
Start: 2024-02-19

## 2024-02-19 RX ORDER — INSULIN LISPRO 100 [IU]/ML
INJECTION, SOLUTION INTRAVENOUS; SUBCUTANEOUS
Qty: 15 ML | Refills: 0 | OUTPATIENT
Start: 2024-02-19

## 2024-02-27 DIAGNOSIS — I10 PRIMARY HYPERTENSION: ICD-10-CM

## 2024-02-27 DIAGNOSIS — E11.9 CONTROLLED TYPE 2 DIABETES MELLITUS WITHOUT COMPLICATION, WITHOUT LONG-TERM CURRENT USE OF INSULIN (HCC): ICD-10-CM

## 2024-02-27 DIAGNOSIS — I42.9 CARDIOMYOPATHY, UNSPECIFIED TYPE (HCC): ICD-10-CM

## 2024-02-27 DIAGNOSIS — Z79.4 CONTROLLED TYPE 2 DIABETES MELLITUS WITH HYPERGLYCEMIA, WITH LONG-TERM CURRENT USE OF INSULIN (HCC): ICD-10-CM

## 2024-02-27 DIAGNOSIS — E11.65 CONTROLLED TYPE 2 DIABETES MELLITUS WITH HYPERGLYCEMIA, WITH LONG-TERM CURRENT USE OF INSULIN (HCC): ICD-10-CM

## 2024-02-27 RX ORDER — PEN NEEDLE, DIABETIC 32GX 5/32"
NEEDLE, DISPOSABLE MISCELLANEOUS
Qty: 100 EACH | Refills: 0 | Status: SHIPPED | OUTPATIENT
Start: 2024-02-27 | End: 2024-02-29 | Stop reason: SDUPTHER

## 2024-02-27 RX ORDER — AMLODIPINE BESYLATE 10 MG/1
10 TABLET ORAL DAILY
Qty: 90 TABLET | Refills: 0 | Status: SHIPPED | OUTPATIENT
Start: 2024-02-27

## 2024-02-27 RX ORDER — CARVEDILOL 25 MG/1
25 TABLET ORAL 2 TIMES DAILY WITH MEALS
Qty: 180 TABLET | Refills: 0 | Status: SHIPPED | OUTPATIENT
Start: 2024-02-27

## 2024-02-27 RX ORDER — INSULIN LISPRO 100 [IU]/ML
INJECTION, SOLUTION INTRAVENOUS; SUBCUTANEOUS
Qty: 15 ML | Refills: 0 | Status: SHIPPED | OUTPATIENT
Start: 2024-02-27 | End: 2024-02-29 | Stop reason: SDUPTHER

## 2024-02-27 RX ORDER — DULAGLUTIDE 4.5 MG/.5ML
INJECTION, SOLUTION SUBCUTANEOUS
Qty: 4 ML | Refills: 0 | Status: SHIPPED | OUTPATIENT
Start: 2024-02-27 | End: 2024-02-29 | Stop reason: SDUPTHER

## 2024-02-29 DIAGNOSIS — Z79.4 CONTROLLED TYPE 2 DIABETES MELLITUS WITH HYPERGLYCEMIA, WITH LONG-TERM CURRENT USE OF INSULIN (HCC): ICD-10-CM

## 2024-02-29 DIAGNOSIS — E11.65 CONTROLLED TYPE 2 DIABETES MELLITUS WITH HYPERGLYCEMIA, WITH LONG-TERM CURRENT USE OF INSULIN (HCC): ICD-10-CM

## 2024-02-29 DIAGNOSIS — E11.9 CONTROLLED TYPE 2 DIABETES MELLITUS WITHOUT COMPLICATION, WITHOUT LONG-TERM CURRENT USE OF INSULIN (HCC): ICD-10-CM

## 2024-03-01 RX ORDER — DULAGLUTIDE 4.5 MG/.5ML
INJECTION, SOLUTION SUBCUTANEOUS
Qty: 6 ML | Refills: 1 | Status: SHIPPED | OUTPATIENT
Start: 2024-03-01

## 2024-03-01 RX ORDER — INSULIN LISPRO 100 [IU]/ML
INJECTION, SOLUTION INTRAVENOUS; SUBCUTANEOUS
Qty: 15 ML | Refills: 0 | Status: SHIPPED | OUTPATIENT
Start: 2024-03-01

## 2024-03-01 RX ORDER — PEN NEEDLE, DIABETIC 32GX 5/32"
NEEDLE, DISPOSABLE MISCELLANEOUS
Qty: 100 EACH | Refills: 1 | Status: SHIPPED | OUTPATIENT
Start: 2024-03-01

## 2024-03-08 LAB
ALBUMIN SERPL-MCNC: 4.1 G/DL (ref 3.8–4.9)
ALBUMIN/GLOB SERPL: 1.6 {RATIO} (ref 1.2–2.2)
ALP SERPL-CCNC: 109 IU/L (ref 44–121)
ALT SERPL-CCNC: 20 IU/L (ref 0–44)
AST SERPL-CCNC: 16 IU/L (ref 0–40)
BASOPHILS # BLD AUTO: 0 X10E3/UL (ref 0–0.2)
BASOPHILS NFR BLD AUTO: 0 %
BILIRUB SERPL-MCNC: 0.4 MG/DL (ref 0–1.2)
BUN SERPL-MCNC: 16 MG/DL (ref 6–24)
BUN/CREAT SERPL: 15 (ref 9–20)
CALCIUM SERPL-MCNC: 9.2 MG/DL (ref 8.7–10.2)
CHLORIDE SERPL-SCNC: 105 MMOL/L (ref 96–106)
CHOLEST SERPL-MCNC: 151 MG/DL (ref 100–199)
CO2 SERPL-SCNC: 21 MMOL/L (ref 20–29)
CREAT SERPL-MCNC: 1.05 MG/DL (ref 0.76–1.27)
EGFRCR SERPLBLD CKD-EPI 2021: 83 ML/MIN/1.73
EOSINOPHIL # BLD AUTO: 0.1 X10E3/UL (ref 0–0.4)
EOSINOPHIL NFR BLD AUTO: 3 %
ERYTHROCYTE [DISTWIDTH] IN BLOOD BY AUTOMATED COUNT: 15.7 % (ref 11.6–15.4)
GLOBULIN SER CALC-MCNC: 2.5 G/DL (ref 1.5–4.5)
GLUCOSE SERPL-MCNC: 178 MG/DL (ref 70–99)
HBA1C MFR BLD: 7.6 % (ref 4.8–5.6)
HCT VFR BLD AUTO: 49.5 % (ref 37.5–51)
HDLC SERPL-MCNC: 37 MG/DL
HGB BLD-MCNC: 15.7 G/DL (ref 13–17.7)
IMM GRANULOCYTES # BLD AUTO: 0 X10E3/UL (ref 0–0.1)
IMM GRANULOCYTES NFR BLD AUTO: 0 %
LDLC SERPL CALC-MCNC: 85 MG/DL (ref 0–99)
LYMPHOCYTES # BLD AUTO: 1.2 X10E3/UL (ref 0.7–3.1)
LYMPHOCYTES NFR BLD AUTO: 24 %
MCH RBC QN AUTO: 28 PG (ref 26.6–33)
MCHC RBC AUTO-ENTMCNC: 31.7 G/DL (ref 31.5–35.7)
MCV RBC AUTO: 88 FL (ref 79–97)
MONOCYTES # BLD AUTO: 0.3 X10E3/UL (ref 0.1–0.9)
MONOCYTES NFR BLD AUTO: 7 %
NEUTROPHILS # BLD AUTO: 3.4 X10E3/UL (ref 1.4–7)
NEUTROPHILS NFR BLD AUTO: 66 %
PLATELET # BLD AUTO: 241 X10E3/UL (ref 150–450)
POTASSIUM SERPL-SCNC: 5 MMOL/L (ref 3.5–5.2)
PROT SERPL-MCNC: 6.6 G/DL (ref 6–8.5)
RBC # BLD AUTO: 5.6 X10E6/UL (ref 4.14–5.8)
SODIUM SERPL-SCNC: 141 MMOL/L (ref 134–144)
SPECIMEN STATUS REPORT: NORMAL
TRIGL SERPL-MCNC: 169 MG/DL (ref 0–149)
VLDLC SERPL CALC-MCNC: 29 MG/DL (ref 5–40)
WBC # BLD AUTO: 5.1 X10E3/UL (ref 3.4–10.8)

## 2024-03-11 LAB
IMP & REVIEW OF LAB RESULTS: NORMAL
Lab: NORMAL

## 2024-03-13 ENCOUNTER — OFFICE VISIT (OUTPATIENT)
Facility: CLINIC | Age: 54
End: 2024-03-13
Payer: COMMERCIAL

## 2024-03-13 VITALS
TEMPERATURE: 97 F | SYSTOLIC BLOOD PRESSURE: 105 MMHG | WEIGHT: 315 LBS | HEART RATE: 88 BPM | BODY MASS INDEX: 44.1 KG/M2 | HEIGHT: 71 IN | DIASTOLIC BLOOD PRESSURE: 77 MMHG | RESPIRATION RATE: 16 BRPM | OXYGEN SATURATION: 96 %

## 2024-03-13 DIAGNOSIS — I10 PRIMARY HYPERTENSION: ICD-10-CM

## 2024-03-13 DIAGNOSIS — Z79.4 CONTROLLED TYPE 2 DIABETES MELLITUS WITH HYPERGLYCEMIA, WITH LONG-TERM CURRENT USE OF INSULIN (HCC): Primary | ICD-10-CM

## 2024-03-13 DIAGNOSIS — E78.00 HYPERCHOLESTEROLEMIA: ICD-10-CM

## 2024-03-13 DIAGNOSIS — E11.65 CONTROLLED TYPE 2 DIABETES MELLITUS WITH HYPERGLYCEMIA, WITH LONG-TERM CURRENT USE OF INSULIN (HCC): Primary | ICD-10-CM

## 2024-03-13 DIAGNOSIS — E11.9 CONTROLLED TYPE 2 DIABETES MELLITUS WITHOUT COMPLICATION, WITHOUT LONG-TERM CURRENT USE OF INSULIN (HCC): ICD-10-CM

## 2024-03-13 PROCEDURE — 3051F HG A1C>EQUAL 7.0%<8.0%: CPT | Performed by: NURSE PRACTITIONER

## 2024-03-13 PROCEDURE — 99214 OFFICE O/P EST MOD 30 MIN: CPT | Performed by: NURSE PRACTITIONER

## 2024-03-13 PROCEDURE — 3074F SYST BP LT 130 MM HG: CPT | Performed by: NURSE PRACTITIONER

## 2024-03-13 PROCEDURE — 3078F DIAST BP <80 MM HG: CPT | Performed by: NURSE PRACTITIONER

## 2024-03-13 RX ORDER — INSULIN LISPRO 100 [IU]/ML
INJECTION, SOLUTION INTRAVENOUS; SUBCUTANEOUS
Qty: 15 ML | Refills: 0 | Status: SHIPPED | OUTPATIENT
Start: 2024-03-13

## 2024-03-13 RX ORDER — ATORVASTATIN CALCIUM 40 MG/1
40 TABLET, FILM COATED ORAL NIGHTLY
Qty: 90 TABLET | Refills: 1 | Status: SHIPPED | OUTPATIENT
Start: 2024-03-13

## 2024-03-13 RX ORDER — GABAPENTIN 300 MG/1
300 CAPSULE ORAL NIGHTLY
Qty: 90 CAPSULE | Refills: 0 | Status: SHIPPED | OUTPATIENT
Start: 2024-03-13 | End: 2024-06-11

## 2024-03-13 RX ORDER — DAPAGLIFLOZIN 10 MG/1
10 TABLET, FILM COATED ORAL DAILY
Qty: 90 TABLET | Refills: 1 | Status: SHIPPED | OUTPATIENT
Start: 2024-03-13

## 2024-03-13 ASSESSMENT — PATIENT HEALTH QUESTIONNAIRE - PHQ9
SUM OF ALL RESPONSES TO PHQ QUESTIONS 1-9: 0
1. LITTLE INTEREST OR PLEASURE IN DOING THINGS: 0
SUM OF ALL RESPONSES TO PHQ QUESTIONS 1-9: 0
2. FEELING DOWN, DEPRESSED OR HOPELESS: 0
SUM OF ALL RESPONSES TO PHQ QUESTIONS 1-9: 0
SUM OF ALL RESPONSES TO PHQ QUESTIONS 1-9: 0
SUM OF ALL RESPONSES TO PHQ9 QUESTIONS 1 & 2: 0

## 2024-03-13 NOTE — PROGRESS NOTES
Elvis Matute is a 53 y.o. male (: 1970) presenting to address:    Chief Complaint   Patient presents with    Follow-up       Vitals:    24 1131   BP: 105/77   Pulse: 88   Resp: 16   Temp: 97 °F (36.1 °C)   SpO2: 96%       Coordination of Care Questionaire:   1. \"Have you been to the ER, urgent care clinic since your last visit?  Hospitalized since your last visit?\"  No    2. \"Have you seen or consulted any other health care providers outside of the [facility] since your last visit?\" No      3. For patients aged 45-75: Has the patient had a colonoscopy / FIT/ Cologuard? Yes      If the patient is female:    4. For patients aged 40-74: Has the patient had a mammogram within the past 2 years? N/A      5. For patients aged 21-65: Has the patient had a pap smear? N/A    Advanced Directive:   1. Do you have an Advanced Directive? NO   2. Would you like information on Advanced Directives? NO  
each 3    Continuous Blood Gluc  (FREESTYLE BRYAN 2 READER) DIANA Use to check blood glucose 4 times a day 1 each 0    aspirin 81 MG chewable tablet Take 1 tablet by mouth daily      fluticasone (ARNUITY ELLIPTA) 100 MCG/ACT AEPB Inhale 1 puff into the lungs daily       No current facility-administered medications on file prior to visit.        Allergies   Allergen Reactions    Shellfish Allergy Hives     Okay with benadryl       Objective:  /77 (Site: Right Upper Arm, Position: Sitting, Cuff Size: Large Adult)   Pulse 88   Temp 97 °F (36.1 °C) (Temporal)   Resp 16   Ht 1.803 m (5' 11\")   Wt (!) 143.3 kg (316 lb)   SpO2 96%   BMI 44.07 kg/m²  Body mass index is 44.07 kg/m².    Physical assessment  Physical Exam            I have discussed the diagnosis with the patient and the intended plan as seen in the above orders.  The patient has received an After-Visit Summary and questions were answered concerning future plans.     An After Visit Summary was printed and given to the patient.    All diagnosis have been discussed with the patient and all of the patient's questions have been answered.           Tootie Good, MICHELL[facility]  [address] [place] [address]. [address]

## 2024-03-14 ENCOUNTER — OFFICE VISIT (OUTPATIENT)
Age: 54
End: 2024-03-14
Payer: COMMERCIAL

## 2024-03-14 VITALS
WEIGHT: 315 LBS | OXYGEN SATURATION: 95 % | DIASTOLIC BLOOD PRESSURE: 66 MMHG | HEART RATE: 93 BPM | BODY MASS INDEX: 43.93 KG/M2 | SYSTOLIC BLOOD PRESSURE: 94 MMHG

## 2024-03-14 DIAGNOSIS — E78.00 PURE HYPERCHOLESTEROLEMIA: ICD-10-CM

## 2024-03-14 DIAGNOSIS — E11.65 CONTROLLED TYPE 2 DIABETES MELLITUS WITH HYPERGLYCEMIA, WITH LONG-TERM CURRENT USE OF INSULIN (HCC): ICD-10-CM

## 2024-03-14 DIAGNOSIS — I10 ESSENTIAL HYPERTENSION WITH GOAL BLOOD PRESSURE LESS THAN 140/90: Primary | ICD-10-CM

## 2024-03-14 DIAGNOSIS — Z79.4 CONTROLLED TYPE 2 DIABETES MELLITUS WITH HYPERGLYCEMIA, WITH LONG-TERM CURRENT USE OF INSULIN (HCC): ICD-10-CM

## 2024-03-14 DIAGNOSIS — I48.0 PAF (PAROXYSMAL ATRIAL FIBRILLATION) (HCC): ICD-10-CM

## 2024-03-14 PROCEDURE — 99214 OFFICE O/P EST MOD 30 MIN: CPT | Performed by: INTERNAL MEDICINE
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
## 2024-03-14 NOTE — PROGRESS NOTES
Identified pt with two pt identifiers(name and ). Reviewed record in preparation for visit and have obtained necessary documentation.    Elvis Matute presents today for   Chief Complaint   Patient presents with    Follow-up       Pt c/o  CHEST PAIN/ PRESSURE.          Elvis Matute preferred language for health care discussion is english/other.    Personal Protective Equipment:   Personal Protective Equipment was used including: mask-surgical and hands-gloves. Patient was placed on no precaution(s). Patient was masked.    Precautions:   Patient currently on None  Patient currently roomed with door closed.    Is someone accompanying this pt? no    Is the patient using any DME equipment during OV? cane    Depression Screening:      3/13/2024    11:32 AM 2023    12:54 PM 2023     2:56 PM 2023    10:49 AM 2023     9:20 AM 3/28/2023    10:40 AM 2022     1:00 PM   PHQ-9 Questionaire   Little interest or pleasure in doing things 0 0 0 0 0 0 0   Feeling down, depressed, or hopeless 0 0 0 0 0 0 0   PHQ-9 Total Score 0 0 0 0 0 0 0        Learning Assessment:  No question data found.    Abuse Screening:      3/14/2024     4:00 PM   AMB Abuse Screening   Do you ever feel afraid of your partner? N   Are you in a relationship with someone who physically or mentally threatens you? N   Is it safe for you to go home? Y          Fall Risk      3/14/2024     4:23 PM   Fall Risk   2 or more falls in past year? no   Fall with injury in past year? no         Pt currently taking Anticoagulant therapy? no  Pt currently taking Antiplatelet therapy? no    Coordination of Care:  1. Have you been to the ER, urgent care clinic since your last visit? Hospitalized since your last visit? no    2. Have you seen or consulted any other health care providers outside of the [facility] since your last visit? Include any pap smears or colon screening. no      Please see Red banners under Allergies and Med Rec

## 2024-03-15 RX ORDER — INSULIN LISPRO 100 [IU]/ML
INJECTION, SOLUTION INTRAVENOUS; SUBCUTANEOUS
Qty: 15 ML | Refills: 0 | OUTPATIENT
Start: 2024-03-15

## 2024-03-30 DIAGNOSIS — Z79.4 CONTROLLED TYPE 2 DIABETES MELLITUS WITH HYPERGLYCEMIA, WITH LONG-TERM CURRENT USE OF INSULIN (HCC): ICD-10-CM

## 2024-03-30 DIAGNOSIS — E11.65 CONTROLLED TYPE 2 DIABETES MELLITUS WITH HYPERGLYCEMIA, WITH LONG-TERM CURRENT USE OF INSULIN (HCC): ICD-10-CM

## 2024-04-01 RX ORDER — INSULIN LISPRO 100 [IU]/ML
INJECTION, SOLUTION INTRAVENOUS; SUBCUTANEOUS
Qty: 15 ML | Refills: 0 | Status: SHIPPED | OUTPATIENT
Start: 2024-04-01

## 2024-04-01 RX ORDER — BLOOD SUGAR DIAGNOSTIC
STRIP MISCELLANEOUS
Qty: 400 EACH | Refills: 0 | OUTPATIENT
Start: 2024-04-01

## 2024-05-09 RX ORDER — BLOOD SUGAR DIAGNOSTIC
STRIP MISCELLANEOUS
Qty: 400 STRIP | Refills: 0 | OUTPATIENT
Start: 2024-05-09

## 2024-05-09 NOTE — TELEPHONE ENCOUNTER
Return in about 3 months (around 6/13/2024) for Physical, DM, HTN, HLD, 30min, office, w/labsprior.

## 2024-05-13 RX ORDER — BLOOD SUGAR DIAGNOSTIC
STRIP MISCELLANEOUS
Qty: 100 EACH | Refills: 1 | OUTPATIENT
Start: 2024-05-13

## 2024-05-16 RX ORDER — BLOOD SUGAR DIAGNOSTIC
STRIP MISCELLANEOUS
Qty: 100 EACH | Refills: 1 | OUTPATIENT
Start: 2024-05-16

## 2024-05-23 RX ORDER — BLOOD SUGAR DIAGNOSTIC
STRIP MISCELLANEOUS
Qty: 100 EACH | Refills: 1 | OUTPATIENT
Start: 2024-05-23

## 2024-05-31 DIAGNOSIS — I10 PRIMARY HYPERTENSION: ICD-10-CM

## 2024-06-03 RX ORDER — AMLODIPINE BESYLATE 10 MG/1
10 TABLET ORAL DAILY
Qty: 90 TABLET | Refills: 0 | Status: SHIPPED | OUTPATIENT
Start: 2024-06-03

## 2024-06-03 NOTE — TELEPHONE ENCOUNTER
Return in about 3 months (around 6/13/2024) for Physical, DM, HTN, HLD, 30min, office, w/labsprior.   Sending scheduling ticket to schedule physical.

## 2024-06-04 ENCOUNTER — TELEPHONE (OUTPATIENT)
Facility: CLINIC | Age: 54
End: 2024-06-04

## 2024-06-04 DIAGNOSIS — Z79.4 CONTROLLED TYPE 2 DIABETES MELLITUS WITH HYPERGLYCEMIA, WITH LONG-TERM CURRENT USE OF INSULIN (HCC): ICD-10-CM

## 2024-06-04 DIAGNOSIS — E11.65 CONTROLLED TYPE 2 DIABETES MELLITUS WITH HYPERGLYCEMIA, WITH LONG-TERM CURRENT USE OF INSULIN (HCC): ICD-10-CM

## 2024-06-04 RX ORDER — BLOOD SUGAR DIAGNOSTIC
STRIP MISCELLANEOUS
Qty: 100 EACH | Refills: 1 | Status: SHIPPED | OUTPATIENT
Start: 2024-06-04

## 2024-06-04 RX ORDER — DULAGLUTIDE 4.5 MG/.5ML
INJECTION, SOLUTION SUBCUTANEOUS
Qty: 6 ML | Refills: 1 | Status: SHIPPED | OUTPATIENT
Start: 2024-06-04

## 2024-06-04 NOTE — TELEPHONE ENCOUNTER
----- Message from Betzy Peters sent at 6/4/2024 12:10 PM EDT -----  Subject: Message to Provider    QUESTIONS  Information for Provider? Pt states he has been taking 5 units of insulin   each time before he eats. He states he has been going by memory for how   much insulin to administer. He is asking if this is correct to do. Pt has   been out of test strips for a couple months and has not been able to test   his blood sugar. Pt also expressed having some back pain to his right side   below rib cage. ---- Prior to this call pt did put in refill request for   test strips and Trulicity. ----- Pt also needs to scheduled LAB APPT for   his AWV.   ---------------------------------------------------------------------------  --------------  CALL BACK INFO  [phone]; OK to leave message on voicemail,OK to respond with electronic   message via [employer] portal (only for patients who have registered [employer]   account)  ---------------------------------------------------------------------------  --------------  SCRIPT ANSWERS  Relationship to Patient? Self

## 2024-06-04 NOTE — TELEPHONE ENCOUNTER
Message  Received: Today  Betzy Peters [facility] Clinical Staff  Subject: Refill Request    QUESTIONS  Name of Medication? ONETOUCH VERIO strip  Patient-reported dosage and instructions? 3 times a day  How many days do you have left? 0  Preferred Pharmacy? [facility]  Pharmacy phone number (if available)? [phone]  Additional Information for Provider? Pt has been out of strips for a  month. Per pt his pharmacy claims they sent a request but no script was  sent.  ---------------------------------------------------------------------------  --------------,  Name of Medication? Dulaglutide (TRULICITY) 4.5 MG/0.5ML SOPN  Patient-reported dosage and instructions? every 7 days  How many days do you have left? 0  Preferred Pharmacy? [facility]  Pharmacy phone number (if available)? [phone]  Additional Information for Provider? Pt used his last Trulicity two weeks  ago. Please call pt once called in per pt request.  ---------------------------------------------------------------------------  --------------  CALL BACK INFO  What is the best way for the office to contact you? OK to leave message on  voicemail  Preferred Call Back Phone Number? [phone]  ---------------------------------------------------------------------------  --------------  SCRIPT ANSWERS  Relationship to Patient? Self

## 2024-06-04 NOTE — TELEPHONE ENCOUNTER
Coverage for Tootie    Message  Received: Today  Betzy Peters  [facility] Clinical Staff  Subject: Message to Provider    QUESTIONS  Information for Provider? Pt states he has been taking 5 units of insulin  each time before he eats. He states he has been going by memory for how  much insulin to administer. He is asking if this is correct to do. Pt has  been out of test strips for a couple months and has not been able to test  his blood sugar. Pt also expressed having some back pain to his right side  below rib cage. ---- Prior to this call pt did put in refill request for  test strips and Trulicity. ----- Pt also needs to scheduled LAB APPT for  his AWV.  ---------------------------------------------------------------------------  --------------  CALL BACK INFO  [phone]; OK to leave message on voicemail,OK to respond with electronic  message via [employer] portal (only for patients who have registered [employer]  account)  ---------------------------------------------------------------------------  --------------  SCRIPT ANSWERS  Relationship to Patient? Self

## 2024-06-05 DIAGNOSIS — Z79.4 CONTROLLED TYPE 2 DIABETES MELLITUS WITH HYPERGLYCEMIA, WITH LONG-TERM CURRENT USE OF INSULIN (HCC): Primary | ICD-10-CM

## 2024-06-05 DIAGNOSIS — I10 PRIMARY HYPERTENSION: ICD-10-CM

## 2024-06-05 DIAGNOSIS — E11.65 CONTROLLED TYPE 2 DIABETES MELLITUS WITH HYPERGLYCEMIA, WITH LONG-TERM CURRENT USE OF INSULIN (HCC): Primary | ICD-10-CM

## 2024-06-05 NOTE — PROGRESS NOTES
PharmD consulted for Mr. Elvis Matute, as he has Octavio 2 sensors at home and his phone is not compatible with the Octavio 2 luke. He requested a Octavio 2 reader to be sent to the pharmacy. He informed lead nurse that he has not been able to check his BG in about 2 months due to being out of test strips and not having a Octavio 2 reader.     Tootie Good, JARRET - CNP is out of office, consulted with Dr. Moy who is agreeable to order Octavio 2 reader. Ordered as verbal with readback.    There are no discontinued medications.     Orders Placed This Encounter    Continuous Glucose  (FREESTYLE OCTAVIO 2 READER) DIANA     Si each by Does not apply route continuous Indications: Type 2 Diabetes Use to monitor blood sugar     Dispense:  1 each     Refill:  0      Thank you,   Karo Núñez, PharmD  Clinical Pharmacist Specialist   24     For Pharmacy Admin Tracking Only    Program: Medical Group  Intervention Detail: New Rx: 1, reason: Needs Additional Therapy  Intervention Accepted By: Provider: 1  Gap Closed?: No   Time Spent (min): 5

## 2024-06-09 RX ORDER — BLOOD SUGAR DIAGNOSTIC
STRIP MISCELLANEOUS
Qty: 100 EACH | Refills: 1 | OUTPATIENT
Start: 2024-06-09

## 2024-06-25 DIAGNOSIS — I42.9 CARDIOMYOPATHY, UNSPECIFIED TYPE (HCC): ICD-10-CM

## 2024-06-26 RX ORDER — CARVEDILOL 25 MG/1
25 TABLET ORAL 2 TIMES DAILY WITH MEALS
Qty: 180 TABLET | Refills: 1 | Status: SHIPPED | OUTPATIENT
Start: 2024-06-26

## 2024-07-22 DIAGNOSIS — Z79.4 CONTROLLED TYPE 2 DIABETES MELLITUS WITH HYPERGLYCEMIA, WITH LONG-TERM CURRENT USE OF INSULIN (HCC): ICD-10-CM

## 2024-07-22 DIAGNOSIS — I42.9 CARDIOMYOPATHY, UNSPECIFIED TYPE (HCC): ICD-10-CM

## 2024-07-22 DIAGNOSIS — E11.65 CONTROLLED TYPE 2 DIABETES MELLITUS WITH HYPERGLYCEMIA, WITH LONG-TERM CURRENT USE OF INSULIN (HCC): ICD-10-CM

## 2024-07-22 RX ORDER — ISOSORBIDE MONONITRATE 30 MG/1
30 TABLET, EXTENDED RELEASE ORAL DAILY
Qty: 90 TABLET | Refills: 0 | OUTPATIENT
Start: 2024-07-22

## 2024-07-25 ENCOUNTER — HOSPITAL ENCOUNTER (OUTPATIENT)
Facility: HOSPITAL | Age: 54
Setting detail: SPECIMEN
Discharge: HOME | End: 2024-07-25
Payer: COMMERCIAL

## 2024-07-25 DIAGNOSIS — E78.00 HYPERCHOLESTEROLEMIA: ICD-10-CM

## 2024-07-25 DIAGNOSIS — I10 PRIMARY HYPERTENSION: ICD-10-CM

## 2024-07-25 DIAGNOSIS — E11.65 CONTROLLED TYPE 2 DIABETES MELLITUS WITH HYPERGLYCEMIA, WITH LONG-TERM CURRENT USE OF INSULIN (HCC): ICD-10-CM

## 2024-07-25 DIAGNOSIS — Z79.4 CONTROLLED TYPE 2 DIABETES MELLITUS WITH HYPERGLYCEMIA, WITH LONG-TERM CURRENT USE OF INSULIN (HCC): ICD-10-CM

## 2024-07-25 LAB
ALBUMIN SERPL-MCNC: 3.8 G/DL (ref 3.4–5)
ALBUMIN/GLOB SERPL: 1.1 (ref 0.8–1.7)
ALP SERPL-CCNC: 125 U/L (ref 45–117)
ALT SERPL-CCNC: 26 U/L (ref 16–61)
ANION GAP SERPL CALC-SCNC: 3 MMOL/L (ref 3–18)
AST SERPL-CCNC: 15 U/L (ref 10–38)
BILIRUB SERPL-MCNC: 0.6 MG/DL (ref 0.2–1)
BUN SERPL-MCNC: 19 MG/DL (ref 7–18)
BUN/CREAT SERPL: 14 (ref 12–20)
CALCIUM SERPL-MCNC: 9.2 MG/DL (ref 8.5–10.1)
CHLORIDE SERPL-SCNC: 109 MMOL/L (ref 100–111)
CHOLEST SERPL-MCNC: 127 MG/DL
CO2 SERPL-SCNC: 27 MMOL/L (ref 21–32)
CREAT SERPL-MCNC: 1.36 MG/DL (ref 0.6–1.3)
ERYTHROCYTE [DISTWIDTH] IN BLOOD BY AUTOMATED COUNT: 15.6 % (ref 11.6–14.5)
EST. AVERAGE GLUCOSE BLD GHB EST-MCNC: 148 MG/DL
GLOBULIN SER CALC-MCNC: 3.5 G/DL (ref 2–4)
GLUCOSE SERPL-MCNC: 175 MG/DL (ref 74–99)
HBA1C MFR BLD: 6.8 % (ref 4.2–5.6)
HCT VFR BLD AUTO: 50.9 % (ref 36–48)
HDLC SERPL-MCNC: 36 MG/DL (ref 40–60)
HDLC SERPL: 3.5 (ref 0–5)
HGB BLD-MCNC: 15.7 G/DL (ref 13–16)
LDLC SERPL CALC-MCNC: 45 MG/DL (ref 0–100)
LIPID PANEL: ABNORMAL
MCH RBC QN AUTO: 28.1 PG (ref 24–34)
MCHC RBC AUTO-ENTMCNC: 30.8 G/DL (ref 31–37)
MCV RBC AUTO: 91.1 FL (ref 78–100)
NRBC # BLD: 0 K/UL (ref 0–0.01)
NRBC BLD-RTO: 0 PER 100 WBC
PLATELET # BLD AUTO: 235 K/UL (ref 135–420)
PMV BLD AUTO: 10.8 FL (ref 9.2–11.8)
POTASSIUM SERPL-SCNC: 4.4 MMOL/L (ref 3.5–5.5)
PROT SERPL-MCNC: 7.3 G/DL (ref 6.4–8.2)
RBC # BLD AUTO: 5.59 M/UL (ref 4.35–5.65)
SODIUM SERPL-SCNC: 139 MMOL/L (ref 136–145)
TRIGL SERPL-MCNC: 230 MG/DL
VLDLC SERPL CALC-MCNC: 46 MG/DL
WBC # BLD AUTO: 6.8 K/UL (ref 4.6–13.2)

## 2024-07-25 PROCEDURE — 85027 COMPLETE CBC AUTOMATED: CPT

## 2024-07-25 PROCEDURE — 83036 HEMOGLOBIN GLYCOSYLATED A1C: CPT

## 2024-07-25 PROCEDURE — 80061 LIPID PANEL: CPT

## 2024-07-25 PROCEDURE — 80053 COMPREHEN METABOLIC PANEL: CPT

## 2024-07-25 PROCEDURE — 36415 COLL VENOUS BLD VENIPUNCTURE: CPT

## 2024-07-26 DIAGNOSIS — I42.9 CARDIOMYOPATHY, UNSPECIFIED TYPE (HCC): ICD-10-CM

## 2024-07-29 RX ORDER — ISOSORBIDE MONONITRATE 30 MG/1
30 TABLET, EXTENDED RELEASE ORAL DAILY
Qty: 90 TABLET | Refills: 0 | Status: SHIPPED | OUTPATIENT
Start: 2024-07-29

## 2024-07-30 RX ORDER — SACUBITRIL AND VALSARTAN 24; 26 MG/1; MG/1
1 TABLET, FILM COATED ORAL 2 TIMES DAILY
Qty: 60 TABLET | Refills: 0 | Status: SHIPPED | OUTPATIENT
Start: 2024-07-30

## 2024-07-30 NOTE — TELEPHONE ENCOUNTER
PCP: Tootie Good APRN - CNP    Last appt:  3/14/2024   Future Appointments   Date Time Provider Department Center   8/1/2024 12:30 PM Good, Tootie L, APRN - CNP AMA BS AMB   9/19/2024 11:00 AM Kendra Sarabia PA-C [facility] BS AMB       Requested Prescriptions     Pending Prescriptions Disp Refills    ENTRESTO 24-26 MG per tablet [Pharmacy Med Name: Entresto 24-26 MG Oral Tablet] 60 tablet 0     Sig: Take 1 tablet by mouth twice daily       Request for a 30 or 90 day supply? Provider Discretion    Pharmacy: confirmed     Other Comments:n/a

## 2024-07-31 DIAGNOSIS — Z79.4 CONTROLLED TYPE 2 DIABETES MELLITUS WITH HYPERGLYCEMIA, WITH LONG-TERM CURRENT USE OF INSULIN (HCC): ICD-10-CM

## 2024-07-31 DIAGNOSIS — E11.65 CONTROLLED TYPE 2 DIABETES MELLITUS WITH HYPERGLYCEMIA, WITH LONG-TERM CURRENT USE OF INSULIN (HCC): ICD-10-CM

## 2024-08-01 RX ORDER — GABAPENTIN 300 MG/1
300 CAPSULE ORAL NIGHTLY
Qty: 90 CAPSULE | Refills: 1 | Status: SHIPPED | OUTPATIENT
Start: 2024-08-01 | End: 2025-01-28

## 2024-08-01 RX ORDER — PEN NEEDLE, DIABETIC 32GX 5/32"
NEEDLE, DISPOSABLE MISCELLANEOUS
Qty: 100 EACH | Refills: 1 | Status: SHIPPED | OUTPATIENT
Start: 2024-08-01

## 2024-08-10 DIAGNOSIS — I42.9 CARDIOMYOPATHY, UNSPECIFIED TYPE (HCC): ICD-10-CM

## 2024-08-13 RX ORDER — SPIRONOLACTONE 50 MG/1
50 TABLET, FILM COATED ORAL DAILY
Qty: 90 TABLET | Refills: 1 | Status: SHIPPED | OUTPATIENT
Start: 2024-08-13

## 2024-09-06 RX ORDER — RIVAROXABAN 20 MG/1
20 TABLET, FILM COATED ORAL
Qty: 90 TABLET | Refills: 3 | Status: SHIPPED | OUTPATIENT
Start: 2024-09-06

## 2024-09-11 ENCOUNTER — OFFICE VISIT (OUTPATIENT)
Facility: CLINIC | Age: 54
End: 2024-09-11
Payer: COMMERCIAL

## 2024-09-11 VITALS
SYSTOLIC BLOOD PRESSURE: 92 MMHG | OXYGEN SATURATION: 92 % | DIASTOLIC BLOOD PRESSURE: 63 MMHG | HEIGHT: 71 IN | HEART RATE: 86 BPM | TEMPERATURE: 98 F | WEIGHT: 305 LBS | BODY MASS INDEX: 42.7 KG/M2 | RESPIRATION RATE: 18 BRPM

## 2024-09-11 DIAGNOSIS — Z00.00 ANNUAL PHYSICAL EXAM: Primary | ICD-10-CM

## 2024-09-11 DIAGNOSIS — Z79.4 CONTROLLED TYPE 2 DIABETES MELLITUS WITH HYPERGLYCEMIA, WITH LONG-TERM CURRENT USE OF INSULIN (HCC): ICD-10-CM

## 2024-09-11 DIAGNOSIS — E78.00 HYPERCHOLESTEROLEMIA: ICD-10-CM

## 2024-09-11 DIAGNOSIS — I10 PRIMARY HYPERTENSION: ICD-10-CM

## 2024-09-11 DIAGNOSIS — E11.65 CONTROLLED TYPE 2 DIABETES MELLITUS WITH HYPERGLYCEMIA, WITH LONG-TERM CURRENT USE OF INSULIN (HCC): ICD-10-CM

## 2024-09-11 PROBLEM — Z86.16 HISTORY OF 2019 NOVEL CORONAVIRUS DISEASE (COVID-19): Status: RESOLVED | Noted: 2021-08-23 | Resolved: 2024-09-11

## 2024-09-11 PROBLEM — J45.30 MILD PERSISTENT ASTHMA WITHOUT COMPLICATION: Status: RESOLVED | Noted: 2020-03-20 | Resolved: 2024-09-11

## 2024-09-11 PROCEDURE — 99396 PREV VISIT EST AGE 40-64: CPT | Performed by: NURSE PRACTITIONER

## 2024-09-11 PROCEDURE — 3074F SYST BP LT 130 MM HG: CPT | Performed by: NURSE PRACTITIONER

## 2024-09-11 PROCEDURE — 3078F DIAST BP <80 MM HG: CPT | Performed by: NURSE PRACTITIONER

## 2024-09-11 RX ORDER — AMLODIPINE BESYLATE 5 MG/1
5 TABLET ORAL DAILY
Qty: 90 TABLET | Refills: 1
Start: 2024-09-11

## 2024-09-11 ASSESSMENT — PATIENT HEALTH QUESTIONNAIRE - PHQ9
2. FEELING DOWN, DEPRESSED OR HOPELESS: NOT AT ALL
SUM OF ALL RESPONSES TO PHQ QUESTIONS 1-9: 0
1. LITTLE INTEREST OR PLEASURE IN DOING THINGS: NOT AT ALL
SUM OF ALL RESPONSES TO PHQ9 QUESTIONS 1 & 2: 0

## 2024-09-19 DIAGNOSIS — I10 PRIMARY HYPERTENSION: ICD-10-CM

## 2024-09-19 NOTE — TELEPHONE ENCOUNTER
Kate Leon,    This patient is waiting for Mrs. Sommers to send a prior authorization for a medication. I'm sorry I can't remember what medication. Will you please give the pharmacy a call. Thanks

## 2024-09-20 RX ORDER — AMLODIPINE BESYLATE 10 MG/1
10 TABLET ORAL DAILY
Qty: 90 TABLET | Refills: 0 | OUTPATIENT
Start: 2024-09-20

## 2024-09-24 RX ORDER — SACUBITRIL AND VALSARTAN 24; 26 MG/1; MG/1
1 TABLET, FILM COATED ORAL 2 TIMES DAILY
Qty: 60 TABLET | Refills: 5 | Status: SHIPPED | OUTPATIENT
Start: 2024-09-24

## 2024-09-29 DIAGNOSIS — I10 PRIMARY HYPERTENSION: ICD-10-CM

## 2024-10-01 RX ORDER — BLOOD SUGAR DIAGNOSTIC
STRIP MISCELLANEOUS
Qty: 100 EACH | Refills: 1 | Status: SHIPPED | OUTPATIENT
Start: 2024-10-01

## 2024-10-01 RX ORDER — AMLODIPINE BESYLATE 5 MG/1
5 TABLET ORAL DAILY
Qty: 90 TABLET | Refills: 1 | Status: SHIPPED | OUTPATIENT
Start: 2024-10-01

## 2024-10-08 ENCOUNTER — OFFICE VISIT (OUTPATIENT)
Age: 54
End: 2024-10-08

## 2024-10-08 VITALS
DIASTOLIC BLOOD PRESSURE: 66 MMHG | OXYGEN SATURATION: 97 % | SYSTOLIC BLOOD PRESSURE: 102 MMHG | HEART RATE: 88 BPM | BODY MASS INDEX: 41.98 KG/M2 | WEIGHT: 301 LBS

## 2024-10-08 DIAGNOSIS — E78.00 PURE HYPERCHOLESTEROLEMIA: ICD-10-CM

## 2024-10-08 DIAGNOSIS — I48.0 PAROXYSMAL ATRIAL FIBRILLATION (HCC): ICD-10-CM

## 2024-10-08 DIAGNOSIS — I10 ESSENTIAL HYPERTENSION WITH GOAL BLOOD PRESSURE LESS THAN 140/90: Primary | ICD-10-CM

## 2024-10-08 DIAGNOSIS — I10 PRIMARY HYPERTENSION: ICD-10-CM

## 2024-10-08 DIAGNOSIS — I42.2 HYPERTROPHIC NONOBSTRUCTIVE CARDIOMYOPATHY (HCC): ICD-10-CM

## 2024-10-08 NOTE — PROGRESS NOTES
Identified pt with two pt identifiers(name and ). Reviewed record in preparation for visit and have obtained necessary documentation.    Elvis Matute presents today for   Chief Complaint   Patient presents with    Follow-up     6m       Pt denied DIZZINESS, SOB, CHEST PAIN/ PRESSURE, FATIGUE/WEAKNESS, HEADACHES, SWELLING.             Elvis Matute preferred language for health care discussion is english/other.    Personal Protective Equipment:   Personal Protective Equipment was used including: mask-surgical and hands-gloves. Patient was placed on no precaution(s). Patient was not  masked.    Precautions:   Patient currently on None  Patient currently roomed with door closed.    Is someone accompanying this pt? no    Is the patient using any DME equipment during OV? cane    Depression Screenin/11/2024     1:31 PM 3/13/2024    11:32 AM 2023    12:54 PM 2023     2:56 PM 2023    10:49 AM 2023     9:20 AM 3/28/2023    10:40 AM   PHQ-9 Questionaire   Little interest or pleasure in doing things 0 0 0 0 0 0 0   Feeling down, depressed, or hopeless 0 0 0 0 0 0 0   PHQ-9 Total Score 0 0 0 0 0 0 0        Learning Assessment:  completed    Abuse Screening:      3/14/2024     4:00 PM   AMB Abuse Screening   Do you ever feel afraid of your partner? N   Are you in a relationship with someone who physically or mentally threatens you? N   Is it safe for you to go home? Y          Fall Risk      3/14/2024     4:23 PM   Fall Risk   Do you feel unsteady or are you worried about falling?  no   2 or more falls in past year? no   Fall with injury in past year? no         Pt currently taking Anticoagulant /Antiplatelet therapy? Xarelto 20 mg tab daily/aspirin 81 mg tab     Coordination of Care:  1. Have you been to the ER, urgent care clinic since your last visit? Hospitalized since your last visit? no    2. Have you seen or consulted any other health care providers outside of the [facility]

## 2024-11-13 DIAGNOSIS — Z79.4 CONTROLLED TYPE 2 DIABETES MELLITUS WITH HYPERGLYCEMIA, WITH LONG-TERM CURRENT USE OF INSULIN (HCC): ICD-10-CM

## 2024-11-13 DIAGNOSIS — E11.65 CONTROLLED TYPE 2 DIABETES MELLITUS WITH HYPERGLYCEMIA, WITH LONG-TERM CURRENT USE OF INSULIN (HCC): ICD-10-CM

## 2024-11-13 DIAGNOSIS — E11.9 CONTROLLED TYPE 2 DIABETES MELLITUS WITHOUT COMPLICATION, WITHOUT LONG-TERM CURRENT USE OF INSULIN (HCC): ICD-10-CM

## 2024-11-13 DIAGNOSIS — I42.9 CARDIOMYOPATHY, UNSPECIFIED TYPE (HCC): ICD-10-CM

## 2024-11-14 DIAGNOSIS — I42.9 CARDIOMYOPATHY, UNSPECIFIED TYPE (HCC): ICD-10-CM

## 2024-11-19 RX ORDER — INSULIN LISPRO 100 [IU]/ML
INJECTION, SOLUTION INTRAVENOUS; SUBCUTANEOUS
Qty: 15 ML | Refills: 0 | Status: SHIPPED | OUTPATIENT
Start: 2024-11-19

## 2024-11-19 RX ORDER — INSULIN GLARGINE 100 [IU]/ML
60 INJECTION, SOLUTION SUBCUTANEOUS NIGHTLY
Qty: 5 ADJUSTABLE DOSE PRE-FILLED PEN SYRINGE | Refills: 1 | Status: SHIPPED | OUTPATIENT
Start: 2024-11-19

## 2024-11-19 RX ORDER — ISOSORBIDE MONONITRATE 30 MG/1
30 TABLET, EXTENDED RELEASE ORAL DAILY
Qty: 90 TABLET | Refills: 0 | Status: SHIPPED | OUTPATIENT
Start: 2024-11-19

## 2024-11-19 RX ORDER — FUROSEMIDE 80 MG/1
80 TABLET ORAL DAILY
Qty: 90 TABLET | Refills: 1 | Status: SHIPPED | OUTPATIENT
Start: 2024-11-19

## 2024-11-19 RX ORDER — BLOOD SUGAR DIAGNOSTIC
STRIP MISCELLANEOUS
Qty: 100 EACH | Refills: 1 | Status: SHIPPED | OUTPATIENT
Start: 2024-11-19

## 2024-11-19 RX ORDER — ISOSORBIDE MONONITRATE 30 MG/1
30 TABLET, EXTENDED RELEASE ORAL DAILY
Qty: 90 TABLET | Refills: 0 | OUTPATIENT
Start: 2024-11-19

## 2024-11-25 DIAGNOSIS — E11.65 CONTROLLED TYPE 2 DIABETES MELLITUS WITH HYPERGLYCEMIA, WITH LONG-TERM CURRENT USE OF INSULIN (HCC): ICD-10-CM

## 2024-11-25 DIAGNOSIS — Z79.4 CONTROLLED TYPE 2 DIABETES MELLITUS WITH HYPERGLYCEMIA, WITH LONG-TERM CURRENT USE OF INSULIN (HCC): ICD-10-CM

## 2024-11-25 DIAGNOSIS — I42.9 CARDIOMYOPATHY, UNSPECIFIED TYPE (HCC): ICD-10-CM

## 2024-11-26 RX ORDER — SPIRONOLACTONE 50 MG/1
50 TABLET, FILM COATED ORAL DAILY
Qty: 90 TABLET | Refills: 1 | Status: SHIPPED | OUTPATIENT
Start: 2024-11-26

## 2024-12-04 DIAGNOSIS — Z79.4 CONTROLLED TYPE 2 DIABETES MELLITUS WITH HYPERGLYCEMIA, WITH LONG-TERM CURRENT USE OF INSULIN (HCC): ICD-10-CM

## 2024-12-04 DIAGNOSIS — E11.65 CONTROLLED TYPE 2 DIABETES MELLITUS WITH HYPERGLYCEMIA, WITH LONG-TERM CURRENT USE OF INSULIN (HCC): ICD-10-CM

## 2024-12-04 RX ORDER — DULAGLUTIDE 4.5 MG/.5ML
INJECTION, SOLUTION SUBCUTANEOUS
Qty: 6 ML | Refills: 1 | Status: SHIPPED | OUTPATIENT
Start: 2024-12-04

## 2024-12-06 DIAGNOSIS — E11.65 CONTROLLED TYPE 2 DIABETES MELLITUS WITH HYPERGLYCEMIA, WITH LONG-TERM CURRENT USE OF INSULIN (HCC): ICD-10-CM

## 2024-12-06 DIAGNOSIS — Z79.4 CONTROLLED TYPE 2 DIABETES MELLITUS WITH HYPERGLYCEMIA, WITH LONG-TERM CURRENT USE OF INSULIN (HCC): ICD-10-CM

## 2024-12-06 RX ORDER — DULAGLUTIDE 4.5 MG/.5ML
INJECTION, SOLUTION SUBCUTANEOUS
Qty: 6 ML | Refills: 1 | OUTPATIENT
Start: 2024-12-06

## 2024-12-20 DIAGNOSIS — Z79.4 CONTROLLED TYPE 2 DIABETES MELLITUS WITH HYPERGLYCEMIA, WITH LONG-TERM CURRENT USE OF INSULIN (HCC): ICD-10-CM

## 2024-12-20 DIAGNOSIS — E11.65 CONTROLLED TYPE 2 DIABETES MELLITUS WITH HYPERGLYCEMIA, WITH LONG-TERM CURRENT USE OF INSULIN (HCC): ICD-10-CM

## 2024-12-20 RX ORDER — PEN NEEDLE, DIABETIC 32GX 5/32"
NEEDLE, DISPOSABLE MISCELLANEOUS
Qty: 400 EACH | Refills: 3 | Status: SHIPPED | OUTPATIENT
Start: 2024-12-20

## 2024-12-20 NOTE — TELEPHONE ENCOUNTER
PCP: Tootie Good APRN - CNP    Last appt:  3/14/2024   Future Appointments   Date Time Provider Department Center   1/13/2025  2:30 PM Tootie Good APRN - CNP [facility]   4/8/2025  1:00 PM Kg Simeon MD [facility] BS AMB       Requested Prescriptions     Pending Prescriptions Disp Refills    rivaroxaban (XARELTO) 20 MG TABS tablet 90 tablet 3     Sig: Take 1 tablet by mouth daily (with breakfast)       Request for a 30 or 90 day supply? Provider Discretion    Pharmacy: confirmed    Other Comments: n/a

## 2025-01-10 DIAGNOSIS — E11.65 CONTROLLED TYPE 2 DIABETES MELLITUS WITH HYPERGLYCEMIA, WITH LONG-TERM CURRENT USE OF INSULIN (HCC): ICD-10-CM

## 2025-01-10 DIAGNOSIS — Z79.4 CONTROLLED TYPE 2 DIABETES MELLITUS WITH HYPERGLYCEMIA, WITH LONG-TERM CURRENT USE OF INSULIN (HCC): ICD-10-CM

## 2025-01-10 RX ORDER — DAPAGLIFLOZIN 10 MG/1
10 TABLET, FILM COATED ORAL DAILY
Qty: 90 TABLET | Refills: 1 | Status: SHIPPED | OUTPATIENT
Start: 2025-01-10

## 2025-01-10 RX ORDER — BLOOD SUGAR DIAGNOSTIC
STRIP MISCELLANEOUS
Qty: 100 EACH | Refills: 1 | Status: SHIPPED | OUTPATIENT
Start: 2025-01-10

## 2025-01-13 ENCOUNTER — OFFICE VISIT (OUTPATIENT)
Facility: CLINIC | Age: 55
End: 2025-01-13
Payer: COMMERCIAL

## 2025-01-13 VITALS
WEIGHT: 303.2 LBS | DIASTOLIC BLOOD PRESSURE: 79 MMHG | BODY MASS INDEX: 42.45 KG/M2 | SYSTOLIC BLOOD PRESSURE: 114 MMHG | OXYGEN SATURATION: 93 % | HEIGHT: 71 IN | HEART RATE: 89 BPM | TEMPERATURE: 97.2 F | RESPIRATION RATE: 18 BRPM

## 2025-01-13 DIAGNOSIS — E78.00 HYPERCHOLESTEROLEMIA: ICD-10-CM

## 2025-01-13 DIAGNOSIS — Z86.69 HISTORY OF SLEEP APNEA: ICD-10-CM

## 2025-01-13 DIAGNOSIS — Z79.4 CONTROLLED TYPE 2 DIABETES MELLITUS WITH HYPERGLYCEMIA, WITH LONG-TERM CURRENT USE OF INSULIN (HCC): Primary | ICD-10-CM

## 2025-01-13 DIAGNOSIS — I10 PRIMARY HYPERTENSION: ICD-10-CM

## 2025-01-13 DIAGNOSIS — E11.65 CONTROLLED TYPE 2 DIABETES MELLITUS WITH HYPERGLYCEMIA, WITH LONG-TERM CURRENT USE OF INSULIN (HCC): Primary | ICD-10-CM

## 2025-01-13 LAB — HBA1C MFR BLD: 6 %

## 2025-01-13 PROCEDURE — 3078F DIAST BP <80 MM HG: CPT | Performed by: NURSE PRACTITIONER

## 2025-01-13 PROCEDURE — 3074F SYST BP LT 130 MM HG: CPT | Performed by: NURSE PRACTITIONER

## 2025-01-13 PROCEDURE — 99214 OFFICE O/P EST MOD 30 MIN: CPT | Performed by: NURSE PRACTITIONER

## 2025-01-13 PROCEDURE — 83036 HEMOGLOBIN GLYCOSYLATED A1C: CPT | Performed by: NURSE PRACTITIONER

## 2025-01-13 RX ORDER — ATORVASTATIN CALCIUM 40 MG/1
40 TABLET, FILM COATED ORAL NIGHTLY
Qty: 90 TABLET | Refills: 1 | Status: SHIPPED | OUTPATIENT
Start: 2025-01-13

## 2025-01-13 RX ORDER — ATORVASTATIN CALCIUM 40 MG/1
40 TABLET, FILM COATED ORAL NIGHTLY
Qty: 90 TABLET | Refills: 1 | Status: CANCELLED | OUTPATIENT
Start: 2025-01-13

## 2025-01-13 SDOH — ECONOMIC STABILITY: FOOD INSECURITY: WITHIN THE PAST 12 MONTHS, THE FOOD YOU BOUGHT JUST DIDN'T LAST AND YOU DIDN'T HAVE MONEY TO GET MORE.: NEVER TRUE

## 2025-01-13 SDOH — ECONOMIC STABILITY: FOOD INSECURITY: WITHIN THE PAST 12 MONTHS, YOU WORRIED THAT YOUR FOOD WOULD RUN OUT BEFORE YOU GOT MONEY TO BUY MORE.: NEVER TRUE

## 2025-01-13 ASSESSMENT — PATIENT HEALTH QUESTIONNAIRE - PHQ9
SUM OF ALL RESPONSES TO PHQ9 QUESTIONS 1 & 2: 1
SUM OF ALL RESPONSES TO PHQ QUESTIONS 1-9: 1
1. LITTLE INTEREST OR PLEASURE IN DOING THINGS: SEVERAL DAYS
SUM OF ALL RESPONSES TO PHQ QUESTIONS 1-9: 1
2. FEELING DOWN, DEPRESSED OR HOPELESS: NOT AT ALL
SUM OF ALL RESPONSES TO PHQ QUESTIONS 1-9: 1
SUM OF ALL RESPONSES TO PHQ QUESTIONS 1-9: 1

## 2025-01-13 NOTE — PROGRESS NOTES
Room 7    Elvis Matute had concerns including Follow-up Chronic Condition, Cholesterol Problem, Hypertension, and Depression. for today's visit .     Pt presents today for point of care A1C of 6.6. POLY Good has been notified .    When asked if patient has any concerns he would like to address with LIO Good pt states my right shoulder has been bothering me . LIO Good has been notified  of patient concerns .      1. \"Have you been to the ER, urgent care clinic since your last visit?  Hospitalized since your last visit?\" .NO    2. \"Have you seen or consulted any other health care providers [facility] of the [facility] your [facility] visit?\" NO     3. For patients aged 45-75: Has the patient had a colonoscopy / FIT/ Cologuard? Yes      If the patient is female:    4. For patients aged 40-74: Has the patient had a mammogram within the past 2 years? N/A      5. For patients aged 21-65: Has the patient had a pap smear? {Cancer Care Gap present? N/A          1/13/2025     2:39 PM   PHQ-9    Little interest or pleasure in doing things 1   Feeling down, depressed, or hopeless 0   PHQ-2 Score 1   PHQ-9 Total Score 1          Health Maintenance Due   Topic Date Due    Hepatitis B vaccine (1 of 3 - 19+ 3-dose series) Never done    DTaP/Tdap/Td vaccine (1 - Tdap) Never done    Pneumococcal 0-64 years Vaccine (2 of 2 - PCV) 08/09/2014    Shingles vaccine (1 of 2) Never done    Diabetic retinal exam  12/08/2023    Diabetic foot exam  03/28/2024    Flu vaccine (1) 08/01/2024    COVID-19 Vaccine (1 - 2023-24 season) Never done             
NEEDLE  SUBCUTANEOUSLY 4 TIMES DAILY 400 each 3    Dulaglutide (TRULICITY) 4.5 MG/0.5ML SOAJ INJECT 4.5 MG SUBCUTANEOUSLY ONCE A WEEK 6 mL 1    metFORMIN (GLUCOPHAGE) 500 MG tablet Take 1 tablet by mouth 2 times daily 180 tablet 1    spironolactone (ALDACTONE) 50 MG tablet Take 1 tablet by mouth daily 90 tablet 1    insulin glargine (LANTUS SOLOSTAR) 100 UNIT/ML injection pen Inject 60 Units into the skin nightly 5 Adjustable Dose Pre-filled Pen Syringe 1    furosemide (LASIX) 80 MG tablet Take 1 tablet by mouth daily 90 tablet 1    insulin lispro, 1 Unit Dial, (HUMALOG/ADMELOG) 100 UNIT/ML SOPN INJECT 5 UNITS SUBCUTANEOUSLY WITH MEALS 3 TIMES DAILY (STOP HUMALOG 75/25) 15 mL 0    isosorbide mononitrate (IMDUR) 30 MG extended release tablet Take 1 tablet by mouth daily 90 tablet 0    ENTRESTO 24-26 MG per tablet Take 1 tablet by mouth twice daily 60 tablet 5    gabapentin (NEURONTIN) 300 MG capsule Take 1 capsule by mouth nightly for 180 days. Max Daily Amount: 300 mg 90 capsule 1    carvedilol (COREG) 25 MG tablet TAKE 1 TABLET BY MOUTH TWICE DAILY WITH MEALS 180 tablet 1    Continuous Glucose  (FREESTYLE BRYAN 2 READER) DIANA 1 each by Does not apply route continuous Indications: Type 2 Diabetes Use to monitor blood sugar 1 each 0    atorvastatin (LIPITOR) 40 MG tablet Take 1 tablet by mouth nightly 90 tablet 1    Continuous Blood Gluc Sensor (FREESTYLE BRYAN 2 SENSOR) MISC Use to check blood sugar 4 times a day 2 each 3    hydrALAZINE (APRESOLINE) 50 MG tablet TAKE 1 TABLET BY MOUTH THREE TIMES DAILY 270 tablet 1    albuterol sulfate HFA (PROVENTIL;VENTOLIN;PROAIR) 108 (90 Base) MCG/ACT inhaler INHALE 1 TO 2 PUFFS BY MOUTH EVERY 4 TO 6 HOURS AS NEEDED FOR SHORTNESS OF BREATH 18 g 1    ipratropium (ATROVENT HFA) 17 MCG/ACT inhaler Inhale 1 puff into the lungs every 6 hours as needed for Wheezing 1 each 5    ADVAIR DISKUS 250-50 MCG/ACT AEPB diskus inhaler INHALE 1 DOSE BY MOUTH TWICE DAILY APPROXIMATELY 12

## 2025-01-21 NOTE — TELEPHONE ENCOUNTER
PCP: Tootie Good APRN - CNP    Last appt:  3/14/2024   Future Appointments   Date Time Provider Department Center   4/8/2025  1:00 PM Kg Simeon MD [facility] BS [facility]   7/7/2025 10:15 AM AMA LAB A [facility] DEP   7/14/2025  1:30 PM Tootie Good, APRN - CNP AMA [facility]       Requested Prescriptions     Pending Prescriptions Disp Refills    sacubitril-valsartan (ENTRESTO) 24-26 MG per tablet 180 tablet 2     Sig: Take 1 tablet by mouth 2 times daily       Request for a 30 or 90 day supply? Provider Discretion    Pharmacy: confirmed    Other Comments: n/a

## 2025-01-22 RX ORDER — SACUBITRIL AND VALSARTAN 24; 26 MG/1; MG/1
1 TABLET, FILM COATED ORAL 2 TIMES DAILY
Qty: 180 TABLET | Refills: 2 | Status: SHIPPED | OUTPATIENT
Start: 2025-01-22

## 2025-01-23 RX ORDER — HYDRALAZINE HYDROCHLORIDE 50 MG/1
50 TABLET, FILM COATED ORAL 3 TIMES DAILY
Qty: 270 TABLET | Refills: 1 | Status: SHIPPED | OUTPATIENT
Start: 2025-01-23

## 2025-02-20 DIAGNOSIS — E78.00 HYPERCHOLESTEROLEMIA: ICD-10-CM

## 2025-02-20 DIAGNOSIS — Z79.4 CONTROLLED TYPE 2 DIABETES MELLITUS WITH HYPERGLYCEMIA, WITH LONG-TERM CURRENT USE OF INSULIN (HCC): ICD-10-CM

## 2025-02-20 DIAGNOSIS — I42.9 CARDIOMYOPATHY, UNSPECIFIED TYPE (HCC): ICD-10-CM

## 2025-02-20 DIAGNOSIS — E11.65 CONTROLLED TYPE 2 DIABETES MELLITUS WITH HYPERGLYCEMIA, WITH LONG-TERM CURRENT USE OF INSULIN (HCC): ICD-10-CM

## 2025-02-23 RX ORDER — ISOSORBIDE MONONITRATE 30 MG/1
30 TABLET, EXTENDED RELEASE ORAL DAILY
Qty: 90 TABLET | Refills: 0 | Status: SHIPPED | OUTPATIENT
Start: 2025-02-23

## 2025-02-23 RX ORDER — CARVEDILOL 25 MG/1
25 TABLET ORAL 2 TIMES DAILY WITH MEALS
Qty: 180 TABLET | Refills: 1 | Status: SHIPPED | OUTPATIENT
Start: 2025-02-23

## 2025-02-23 RX ORDER — GABAPENTIN 300 MG/1
300 CAPSULE ORAL NIGHTLY
Qty: 90 CAPSULE | Refills: 1 | Status: SHIPPED | OUTPATIENT
Start: 2025-02-23 | End: 2025-08-22

## 2025-02-23 RX ORDER — ATORVASTATIN CALCIUM 40 MG/1
40 TABLET, FILM COATED ORAL NIGHTLY
Qty: 90 TABLET | Refills: 1 | Status: SHIPPED | OUTPATIENT
Start: 2025-02-23

## 2025-03-21 DIAGNOSIS — I42.9 CARDIOMYOPATHY, UNSPECIFIED TYPE (HCC): ICD-10-CM

## 2025-03-21 DIAGNOSIS — Z79.4 CONTROLLED TYPE 2 DIABETES MELLITUS WITH HYPERGLYCEMIA, WITH LONG-TERM CURRENT USE OF INSULIN (HCC): ICD-10-CM

## 2025-03-21 DIAGNOSIS — E11.65 CONTROLLED TYPE 2 DIABETES MELLITUS WITH HYPERGLYCEMIA, WITH LONG-TERM CURRENT USE OF INSULIN (HCC): ICD-10-CM

## 2025-03-21 RX ORDER — BLOOD SUGAR DIAGNOSTIC
STRIP MISCELLANEOUS
Qty: 100 EACH | Refills: 1 | Status: SHIPPED | OUTPATIENT
Start: 2025-03-21

## 2025-03-21 RX ORDER — INSULIN LISPRO 100 [IU]/ML
INJECTION, SOLUTION INTRAVENOUS; SUBCUTANEOUS
Qty: 15 ML | Refills: 0 | Status: SHIPPED | OUTPATIENT
Start: 2025-03-21

## 2025-03-21 RX ORDER — SPIRONOLACTONE 50 MG/1
50 TABLET, FILM COATED ORAL DAILY
Qty: 90 TABLET | Refills: 1 | Status: SHIPPED | OUTPATIENT
Start: 2025-03-21

## 2025-03-28 NOTE — TELEPHONE ENCOUNTER
PCP: Tootie Good APRN - CNP    Last appt:  3/14/2024   Future Appointments   Date Time Provider Department Center   4/8/2025  1:00 PM Kg Simeon MD [facility] BS [facility]   7/7/2025 10:15 AM AMA LAB AMA [facility] DEP   7/14/2025  1:30 PM Tootie Good, APRN - CNP AMA [facility] DEP       Requested Prescriptions     Pending Prescriptions Disp Refills    rivaroxaban (XARELTO) 20 MG TABS tablet 90 tablet 3     Sig: Take 1 tablet by mouth daily (with breakfast)       Request for a 30 or 90 day supply? Provider Discretion    Pharmacy: confirmed    Other Comments: n/a

## 2025-04-08 ENCOUNTER — OFFICE VISIT (OUTPATIENT)
Age: 55
End: 2025-04-08
Payer: COMMERCIAL

## 2025-04-08 VITALS
DIASTOLIC BLOOD PRESSURE: 67 MMHG | OXYGEN SATURATION: 93 % | HEIGHT: 71 IN | SYSTOLIC BLOOD PRESSURE: 102 MMHG | HEART RATE: 90 BPM | BODY MASS INDEX: 42.42 KG/M2 | WEIGHT: 303 LBS

## 2025-04-08 DIAGNOSIS — I42.9 CARDIOMYOPATHY, UNSPECIFIED TYPE (HCC): ICD-10-CM

## 2025-04-08 PROCEDURE — 3078F DIAST BP <80 MM HG: CPT | Performed by: INTERNAL MEDICINE

## 2025-04-08 PROCEDURE — 99214 OFFICE O/P EST MOD 30 MIN: CPT | Performed by: INTERNAL MEDICINE

## 2025-04-08 PROCEDURE — 3074F SYST BP LT 130 MM HG: CPT | Performed by: INTERNAL MEDICINE

## 2025-04-08 RX ORDER — FUROSEMIDE 40 MG/1
40 TABLET ORAL DAILY
Qty: 30 TABLET | Refills: 1 | Status: SHIPPED | OUTPATIENT
Start: 2025-04-08

## 2025-04-08 ASSESSMENT — PATIENT HEALTH QUESTIONNAIRE - PHQ9
2. FEELING DOWN, DEPRESSED OR HOPELESS: NOT AT ALL
SUM OF ALL RESPONSES TO PHQ QUESTIONS 1-9: 0
1. LITTLE INTEREST OR PLEASURE IN DOING THINGS: NOT AT ALL
SUM OF ALL RESPONSES TO PHQ QUESTIONS 1-9: 0

## 2025-04-08 NOTE — PROGRESS NOTES
Identified pt with two pt identifiers(name and ). Reviewed record in preparation for visit and have obtained necessary documentation.    Elvis Matute presents today for   Chief Complaint   Patient presents with    Follow-up     6m       Pt denies DIZZINESS, SOB, CHEST PAIN/ PRESSURE, FATIGUE/WEAKNESS, HEADACHES, SWELLING.             Elvis Matute preferred language for health care discussion is english/other.    Personal Protective Equipment:   Personal Protective Equipment was used including: mask-surgical and hands-gloves. Patient was placed on no precaution(s). Patient was not masked.    Precautions:   Patient currently on None  Patient currently roomed with door closed.    Is someone accompanying this pt? no    Is the patient using any DME equipment during OV? cane    Depression Screenin/8/2025     1:04 PM 2025     2:39 PM 2024     1:31 PM 3/13/2024    11:32 AM 2023    12:54 PM 2023     2:56 PM 2023    10:49 AM   PHQ-9 Questionaire   Little interest or pleasure in doing things 0 1 0 0 0 0 0   Feeling down, depressed, or hopeless 0 0 0 0 0 0 0   PHQ-9 Total Score 0 1 0 0 0 0 0        Learning Assessment:  Who is the primary learner? Patient    What is the preferred language for health care of the primary learner? ENGLISH    How does the primary learner prefer to learn new concepts? DEMONSTRATION    Answered By patient    Relationship to Learner SELF        Abuse Screening:      3/14/2024     4:00 PM   AMB Abuse Screening   Do you ever feel afraid of your partner? N   Are you in a relationship with someone who physically or mentally threatens you? N   Is it safe for you to go home? Y          Fall Risk      3/14/2024     4:23 PM   Fall Risk   Do you feel unsteady or are you worried about falling?  no   2 or more falls in past year? no   Fall with injury in past year? no         Pt currently taking Anticoagulant /Antiplatelet therapy? Aspirin 81mg    Coordination of Care:  1.

## 2025-04-08 NOTE — PATIENT INSTRUCTIONS
Testing   Echo/ Nuclear Stress/ Treadmill Stress/ 24/48 HR Holter    Please call [facility] central scheduling at [phone]/ [facility] central scheduling at [phone] to schedule testing.     Echo  PATIENT PREPS:   -Wear easy to remove shirt to your appointment for easier accessibility.    Nuclear Stress Instructions-  PATIENT PREPS:   -NPO (Nothing to eat or drink) after midnight the night prior to the exam.    -No medications on the day of exam. You may bring them with you.   -Wear comfortable clothing and shoes suitable for walking on a treadmill. (NO sandals, flip flops, high heels, etc)   -The duration of this exam is approximately 2 to 4 hours.      **call office 3-5 days after testing is completed for results** Please ensure testing is completed prior to scheduled follow up appointment. If it is not completed your appointment may be rescheduled**    New Medication/Medication Changes/Medication Refill      **please allow 24-48 hrs for medication to be escribed to pharmacy** If you need any refills on medications please contact your pharmacy so that the request can be escribed to the provider for review seven to 10 days prior to being out of medication.    Labs      *[facility], [address], [place], [place]  ( M - Thur 8am to 3:30pm.) - You must call to make an appointment for blood work at this site.   Contact :[phone]  *[facility] [address] [facility]  *[facility], [address] [facility]  *[facility], [address] [facility]  *[facility], [address] [facility]      Other Follow Up  BP with Nurse x 1 month

## 2025-04-08 NOTE — PROGRESS NOTES
[facility]    Elvis Matute is a 54 y.o. male    Patient is here today for follow-up appointment  For the last couple days patient has been experiencing some dizziness.  He admits that he took Lasix last 2 days and he had to urinate frequently.  His blood pressure at home usually 160 systolic  No chest pain or chest tightness.  Ongoing back pain.    Past Medical History:   Diagnosis Date    A-fib (HCC)     Arrhythmia     A Fib         Asthma     Cardiomyopathy     40% (2011), 60%(02/14)    Chronic congestive heart failure (HCC) 04/26/2022    Chronic obstructive pulmonary disease (HCC)     Diabetes (HCC)     Dyslipidemia     Fracture     right arm    GERD (gastroesophageal reflux disease)     Hx of cardiac cath 02/2021    Hypercholesterolemia     Hypertension     Sleep apnea     no cpap        Past Surgical History:   Procedure Laterality Date    ORTHOPEDIC SURGERY      surgery for wrist and forearm    WISDOM TOOTH EXTRACTION         Current Outpatient Medications   Medication Sig    rivaroxaban (XARELTO) 20 MG TABS tablet Take 1 tablet by mouth daily (with breakfast)    spironolactone (ALDACTONE) 50 MG tablet Take 1 tablet by mouth daily    isosorbide mononitrate (IMDUR) 30 MG extended release tablet Take 1 tablet by mouth daily    carvedilol (COREG) 25 MG tablet Take 1 tablet by mouth 2 times daily (with meals)    atorvastatin (LIPITOR) 40 MG tablet Take 1 tablet by mouth nightly    hydrALAZINE (APRESOLINE) 50 MG tablet Take 1 tablet by mouth 3 times daily    sacubitril-valsartan (ENTRESTO) 24-26 MG per tablet Take 1 tablet by mouth 2 times daily    dapagliflozin (FARXIGA) 10 MG tablet Take 1 tablet by mouth daily    Dulaglutide (TRULICITY) 4.5 MG/0.5ML SOAJ INJECT 4.5 MG SUBCUTANEOUSLY ONCE A WEEK    furosemide (LASIX) 80 MG tablet Take 1 tablet by mouth daily    aspirin 81 MG chewable tablet Take 1 tablet by mouth daily    insulin lispro, 1 Unit Dial, (HUMALOG/ADMELOG) 100 UNIT/ML SOPN INJECT 5 UNITS

## 2025-04-14 DIAGNOSIS — E11.65 CONTROLLED TYPE 2 DIABETES MELLITUS WITH HYPERGLYCEMIA, WITH LONG-TERM CURRENT USE OF INSULIN (HCC): ICD-10-CM

## 2025-04-14 DIAGNOSIS — E78.00 HYPERCHOLESTEROLEMIA: ICD-10-CM

## 2025-04-14 DIAGNOSIS — Z79.4 CONTROLLED TYPE 2 DIABETES MELLITUS WITH HYPERGLYCEMIA, WITH LONG-TERM CURRENT USE OF INSULIN (HCC): ICD-10-CM

## 2025-04-14 RX ORDER — GABAPENTIN 300 MG/1
300 CAPSULE ORAL NIGHTLY
Qty: 90 CAPSULE | Refills: 1 | Status: CANCELLED | OUTPATIENT
Start: 2025-04-14 | End: 2025-10-11

## 2025-04-14 NOTE — TELEPHONE ENCOUNTER
Elvis Matute [facility] Clinical Staff  Phone Number: [phone]     Refills have been requested for the following medications:        gabapentin (NEURONTIN) 300 MG capsule [JARRET Contreras - CNP]      Patient Comment: 2 capsules left      Elvis Matute  [facility] Clinical Staff  Phone Number: [phone]     Refills have been requested for the following medications:        atorvastatin (LIPITOR) 40 MG tablet [Tootie Good, APRN - CNP]      Patient Comment: 1 left    Preferred pharmacy: [facility] [address] [place], [place] [address]Y. - P [phone] - F [phone]

## 2025-04-14 NOTE — TELEPHONE ENCOUNTER
PCP: Tootie Good APRN - CNP    Last appt:  4/8/2025   Future Appointments   Date Time Provider Department Center   5/9/2025  2:00 PM NURSE CLINIC, [facility] AMB   7/7/2025 10:15 AM AMA LAB AMA [facility] DEP   7/14/2025  1:30 PM Tootie Good APRN - CNP AMA [facility]   1/13/2026  2:30 PM Kendra Sarabia PA-C [facility] BS AMB       Requested Prescriptions     Pending Prescriptions Disp Refills    sacubitril-valsartan (ENTRESTO) 24-26 MG per tablet 180 tablet 2     Sig: Take 1 tablet by mouth 2 times daily       Request for a 30 or 90 day supply? Provider Discretion    Pharmacy: Confirmed     Other Comments: N/A

## 2025-04-15 RX ORDER — SACUBITRIL AND VALSARTAN 24; 26 MG/1; MG/1
1 TABLET, FILM COATED ORAL 2 TIMES DAILY
Qty: 180 TABLET | Refills: 2 | Status: SHIPPED | OUTPATIENT
Start: 2025-04-15

## 2025-04-18 RX ORDER — GABAPENTIN 300 MG/1
300 CAPSULE ORAL NIGHTLY
Qty: 90 CAPSULE | Refills: 1 | Status: SHIPPED | OUTPATIENT
Start: 2025-04-18 | End: 2025-10-15

## 2025-04-18 RX ORDER — ATORVASTATIN CALCIUM 40 MG/1
40 TABLET, FILM COATED ORAL NIGHTLY
Qty: 90 TABLET | Refills: 1 | Status: SHIPPED | OUTPATIENT
Start: 2025-04-18

## 2025-04-24 DIAGNOSIS — Z79.4 CONTROLLED TYPE 2 DIABETES MELLITUS WITH HYPERGLYCEMIA, WITH LONG-TERM CURRENT USE OF INSULIN (HCC): ICD-10-CM

## 2025-04-24 DIAGNOSIS — E11.65 CONTROLLED TYPE 2 DIABETES MELLITUS WITH HYPERGLYCEMIA, WITH LONG-TERM CURRENT USE OF INSULIN (HCC): ICD-10-CM

## 2025-04-24 RX ORDER — DAPAGLIFLOZIN 10 MG/1
10 TABLET, FILM COATED ORAL DAILY
Qty: 90 TABLET | Refills: 1 | OUTPATIENT
Start: 2025-04-24

## 2025-04-24 RX ORDER — BLOOD SUGAR DIAGNOSTIC
STRIP MISCELLANEOUS
Qty: 100 EACH | Refills: 1 | OUTPATIENT
Start: 2025-04-24

## 2025-04-29 DIAGNOSIS — Z79.4 CONTROLLED TYPE 2 DIABETES MELLITUS WITH HYPERGLYCEMIA, WITH LONG-TERM CURRENT USE OF INSULIN (HCC): ICD-10-CM

## 2025-04-29 DIAGNOSIS — E11.65 CONTROLLED TYPE 2 DIABETES MELLITUS WITH HYPERGLYCEMIA, WITH LONG-TERM CURRENT USE OF INSULIN (HCC): ICD-10-CM

## 2025-04-30 DIAGNOSIS — J44.9 CHRONIC OBSTRUCTIVE PULMONARY DISEASE, UNSPECIFIED COPD TYPE (HCC): ICD-10-CM

## 2025-04-30 NOTE — TELEPHONE ENCOUNTER
Requested Prescriptions     Pending Prescriptions Disp Refills    ipratropium (ATROVENT HFA) 17 MCG/ACT inhaler 1 each 5     Sig: Inhale 1 puff into the lungs every 6 hours as needed for Wheezing    albuterol sulfate HFA (PROVENTIL;VENTOLIN;PROAIR) 108 (90 Base) MCG/ACT inhaler 18 g 1     Last seen: 1/13/25  Next seen: 7/14/25    Last filled: 8/4/23 and 9/17/23 Qty 1 each w/1 and 5 refills

## 2025-05-05 RX ORDER — ALBUTEROL SULFATE 90 UG/1
2 INHALANT RESPIRATORY (INHALATION) EVERY 4 HOURS PRN
Qty: 18 G | Refills: 5 | Status: SHIPPED | OUTPATIENT
Start: 2025-05-05

## 2025-05-05 RX ORDER — BLOOD SUGAR DIAGNOSTIC
STRIP MISCELLANEOUS
Qty: 100 EACH | Refills: 1 | Status: SHIPPED | OUTPATIENT
Start: 2025-05-05

## 2025-05-05 RX ORDER — DAPAGLIFLOZIN 10 MG/1
10 TABLET, FILM COATED ORAL DAILY
Qty: 90 TABLET | Refills: 1 | Status: SHIPPED | OUTPATIENT
Start: 2025-05-05

## 2025-05-12 ENCOUNTER — CLINICAL SUPPORT (OUTPATIENT)
Age: 55
End: 2025-05-12

## 2025-05-12 VITALS — HEART RATE: 77 BPM | SYSTOLIC BLOOD PRESSURE: 117 MMHG | OXYGEN SATURATION: 95 % | DIASTOLIC BLOOD PRESSURE: 73 MMHG

## 2025-05-12 DIAGNOSIS — Z01.30 BLOOD PRESSURE CHECK: Primary | ICD-10-CM

## 2025-05-12 NOTE — PROGRESS NOTES
Elvis Matute was seen in our office today for BP evaluation. Listed below are the patients current meds:      Current Outpatient Medications:     sacubitril-valsartan (ENTRESTO) 24-26 MG per tablet, Take 1 tablet by mouth 2 times daily, Disp: 180 tablet, Rfl: 2    spironolactone (ALDACTONE) 50 MG tablet, Take 1 tablet by mouth daily, Disp: 90 tablet, Rfl: 1    isosorbide mononitrate (IMDUR) 30 MG extended release tablet, Take 1 tablet by mouth daily, Disp: 90 tablet, Rfl: 0    carvedilol (COREG) 25 MG tablet, Take 1 tablet by mouth 2 times daily (with meals), Disp: 180 tablet, Rfl: 1    aspirin 81 MG chewable tablet, Take 1 tablet by mouth daily, Disp: , Rfl:     ONETOUCH VERIO strip, USE 1 STRIP TO CHECK GLUCOSE 4 TIMES DAILY, Disp: 100 each, Rfl: 1    dapagliflozin (FARXIGA) 10 MG tablet, Take 1 tablet by mouth daily, Disp: 90 tablet, Rfl: 1    ipratropium (ATROVENT HFA) 17 MCG/ACT inhaler, Inhale 1 puff into the lungs every 6 hours as needed for Wheezing, Disp: 1 each, Rfl: 5    albuterol sulfate HFA (PROVENTIL;VENTOLIN;PROAIR) 108 (90 Base) MCG/ACT inhaler, Inhale 2 puffs into the lungs every 4 hours as needed for Wheezing, Disp: 18 g, Rfl: 5    atorvastatin (LIPITOR) 40 MG tablet, Take 1 tablet by mouth nightly, Disp: 90 tablet, Rfl: 1    gabapentin (NEURONTIN) 300 MG capsule, Take 1 capsule by mouth nightly for 180 days. Max Daily Amount: 300 mg, Disp: 90 capsule, Rfl: 1    furosemide (LASIX) 40 MG tablet, Take 1 tablet by mouth daily, Disp: 30 tablet, Rfl: 1    rivaroxaban (XARELTO) 20 MG TABS tablet, Take 1 tablet by mouth daily (with breakfast), Disp: 90 tablet, Rfl: 3    insulin lispro, 1 Unit Dial, (HUMALOG/ADMELOG) 100 UNIT/ML SOPN, INJECT 5 UNITS SUBCUTANEOUSLY WITH MEALS 3 TIMES DAILY (STOP HUMALOG 75/25), Disp: 15 mL, Rfl: 0    metFORMIN (GLUCOPHAGE) 500 MG tablet, Take 1 tablet by mouth 2 times daily, Disp: 180 tablet, Rfl: 1    hydrALAZINE (APRESOLINE) 50 MG tablet, Take 1 tablet by mouth 3 times

## 2025-06-17 DIAGNOSIS — I42.9 CARDIOMYOPATHY, UNSPECIFIED TYPE (HCC): ICD-10-CM

## 2025-06-18 RX ORDER — ISOSORBIDE MONONITRATE 30 MG/1
30 TABLET, EXTENDED RELEASE ORAL DAILY
Qty: 90 TABLET | Refills: 0 | Status: SHIPPED | OUTPATIENT
Start: 2025-06-18

## 2025-07-07 ENCOUNTER — HOSPITAL ENCOUNTER (OUTPATIENT)
Facility: HOSPITAL | Age: 55
Setting detail: SPECIMEN
Discharge: HOME | End: 2025-07-10
Payer: COMMERCIAL

## 2025-07-07 DIAGNOSIS — Z79.4 CONTROLLED TYPE 2 DIABETES MELLITUS WITH HYPERGLYCEMIA, WITH LONG-TERM CURRENT USE OF INSULIN (HCC): ICD-10-CM

## 2025-07-07 DIAGNOSIS — I10 PRIMARY HYPERTENSION: ICD-10-CM

## 2025-07-07 DIAGNOSIS — E78.00 HYPERCHOLESTEROLEMIA: ICD-10-CM

## 2025-07-07 DIAGNOSIS — E11.65 CONTROLLED TYPE 2 DIABETES MELLITUS WITH HYPERGLYCEMIA, WITH LONG-TERM CURRENT USE OF INSULIN (HCC): ICD-10-CM

## 2025-07-07 LAB
ALBUMIN SERPL-MCNC: 3.7 G/DL (ref 3.4–5)
ALBUMIN/GLOB SERPL: 1.2 (ref 0.8–1.7)
ALP SERPL-CCNC: 106 U/L (ref 45–117)
ALT SERPL-CCNC: 19 U/L (ref 10–50)
ANION GAP SERPL CALC-SCNC: 13 MMOL/L (ref 3–18)
AST SERPL-CCNC: 15 U/L (ref 10–38)
BILIRUB SERPL-MCNC: 0.6 MG/DL (ref 0.2–1)
BUN SERPL-MCNC: 20 MG/DL (ref 6–23)
BUN/CREAT SERPL: 14 (ref 12–20)
CALCIUM SERPL-MCNC: 9.6 MG/DL (ref 8.5–10.1)
CHLORIDE SERPL-SCNC: 106 MMOL/L (ref 98–107)
CHOLEST SERPL-MCNC: 155 MG/DL
CO2 SERPL-SCNC: 25 MMOL/L (ref 21–32)
CREAT SERPL-MCNC: 1.38 MG/DL (ref 0.6–1.3)
CREAT UR-MCNC: 283 MG/DL (ref 30–125)
ERYTHROCYTE [DISTWIDTH] IN BLOOD BY AUTOMATED COUNT: 15.9 % (ref 11.6–14.5)
EST. AVERAGE GLUCOSE BLD GHB EST-MCNC: 134 MG/DL
GLOBULIN SER CALC-MCNC: 3.1 G/DL (ref 2–4)
GLUCOSE SERPL-MCNC: 111 MG/DL (ref 74–108)
HBA1C MFR BLD: 6.3 % (ref 4.2–5.6)
HCT VFR BLD AUTO: 52.6 % (ref 36–48)
HDLC SERPL-MCNC: 44 MG/DL (ref 40–60)
HDLC SERPL: 3.6 (ref 0–5)
HGB BLD-MCNC: 15.9 G/DL (ref 13–16)
LDLC SERPL CALC-MCNC: 87 MG/DL (ref 0–100)
MCH RBC QN AUTO: 27.8 PG (ref 24–34)
MCHC RBC AUTO-ENTMCNC: 30.2 G/DL (ref 31–37)
MCV RBC AUTO: 92.1 FL (ref 78–100)
MICROALBUMIN UR-MCNC: 3.42 MG/DL (ref 0–3)
MICROALBUMIN/CREAT UR-RTO: 12 MG/G (ref 0–30)
NRBC # BLD: 0 K/UL (ref 0–0.01)
NRBC BLD-RTO: 0 PER 100 WBC
PLATELET # BLD AUTO: 244 K/UL (ref 135–420)
PMV BLD AUTO: 11.5 FL (ref 9.2–11.8)
POTASSIUM SERPL-SCNC: 5 MMOL/L (ref 3.5–5.5)
PROT SERPL-MCNC: 6.8 G/DL (ref 6.4–8.2)
RBC # BLD AUTO: 5.71 M/UL (ref 4.35–5.65)
SODIUM SERPL-SCNC: 143 MMOL/L (ref 136–145)
TRIGL SERPL-MCNC: 121 MG/DL (ref 0–150)
VLDLC SERPL CALC-MCNC: 24 MG/DL
WBC # BLD AUTO: 5.7 K/UL (ref 4.6–13.2)

## 2025-07-07 PROCEDURE — 80053 COMPREHEN METABOLIC PANEL: CPT

## 2025-07-07 PROCEDURE — 83036 HEMOGLOBIN GLYCOSYLATED A1C: CPT

## 2025-07-07 PROCEDURE — 82570 ASSAY OF URINE CREATININE: CPT

## 2025-07-07 PROCEDURE — 80061 LIPID PANEL: CPT

## 2025-07-07 PROCEDURE — 85027 COMPLETE CBC AUTOMATED: CPT

## 2025-07-07 PROCEDURE — 82043 UR ALBUMIN QUANTITATIVE: CPT

## 2025-07-07 PROCEDURE — 36415 COLL VENOUS BLD VENIPUNCTURE: CPT

## 2025-08-28 DIAGNOSIS — I42.9 CARDIOMYOPATHY, UNSPECIFIED TYPE (HCC): ICD-10-CM

## 2025-08-28 RX ORDER — ISOSORBIDE MONONITRATE 30 MG/1
30 TABLET, EXTENDED RELEASE ORAL DAILY
Qty: 90 TABLET | Refills: 0 | Status: SHIPPED | OUTPATIENT
Start: 2025-08-28

## (undated) DEVICE — PRESSURE MONITORING SET: Brand: TRUWAVE

## (undated) DEVICE — CATHETER ANGIO 5FR L100CM GRY S STL NYL JR4 3 SEG BRAID L

## (undated) DEVICE — GLIDESHEATH SLENDER STAINLESS STEEL KIT: Brand: GLIDESHEATH SLENDER

## (undated) DEVICE — SET ANGIO L6.5IN L BOR 3 W STPCOCK SPIK TBNG

## (undated) DEVICE — CATHETER ANGIO JL4 0.045 INX5 FRX100 CM THRULUMEN EXPO

## (undated) DEVICE — RADIFOCUS OPTITORQUE ANGIOGRAPHIC CATHETER: Brand: OPTITORQUE

## (undated) DEVICE — SURGICAL PROCEDURE KIT LT HRT CUST

## (undated) DEVICE — BAND RADIAL COMPR ARTERY 27CM -- LNG BX/10

## (undated) DEVICE — GUIDEWIRE VASC L260CM DIA0.035IN RAD 3MM J TIP L7CM PTFE